# Patient Record
Sex: MALE | Race: WHITE | Employment: FULL TIME | URBAN - METROPOLITAN AREA
[De-identification: names, ages, dates, MRNs, and addresses within clinical notes are randomized per-mention and may not be internally consistent; named-entity substitution may affect disease eponyms.]

---

## 2017-01-20 ENCOUNTER — TELEPHONE (OUTPATIENT)
Dept: FAMILY MEDICINE CLINIC | Age: 37
End: 2017-01-20

## 2017-03-03 ENCOUNTER — OFFICE VISIT (OUTPATIENT)
Dept: FAMILY MEDICINE CLINIC | Age: 37
End: 2017-03-03

## 2017-03-03 VITALS
SYSTOLIC BLOOD PRESSURE: 128 MMHG | BODY MASS INDEX: 33.65 KG/M2 | TEMPERATURE: 98.1 F | HEART RATE: 62 BPM | OXYGEN SATURATION: 96 % | WEIGHT: 209.4 LBS | HEIGHT: 66 IN | DIASTOLIC BLOOD PRESSURE: 83 MMHG | RESPIRATION RATE: 16 BRPM

## 2017-03-03 DIAGNOSIS — Z51.81 ENCOUNTER FOR MEDICATION MONITORING: ICD-10-CM

## 2017-03-03 DIAGNOSIS — R79.89 ELEVATED LFTS: ICD-10-CM

## 2017-03-03 DIAGNOSIS — E78.2 MIXED HYPERLIPIDEMIA: Primary | ICD-10-CM

## 2017-03-03 NOTE — PROGRESS NOTES
Chief Complaint   Patient presents with    Cholesterol Problem     follow up         Lipid 10/31/2016    CMP 8/15/2016

## 2017-03-03 NOTE — PROGRESS NOTES
HISTORY OF PRESENT ILLNESS  Serafin Hutchins is a 43 y.o. male. HPI   For follow up on cholesterol. Feeling well. Hypercholesterolemia follow up:  Has FH of CAD. Has had negative CT heart scan with calcium score of zero March 2015. Compliant w/ low fat, low cholesterol diet. Off of Lipitor d/t elevated LFTS. Was not able to afford tricor which had been added for high triglycerides. Exercising some. No muscle nor abdominal pain, no skin discoloration. Patient is  fasting today. No hx of liver problems in the past.  He does not drink excessively. Hepatitis studies were negative. Patient Active Problem List   Diagnosis Code    Mixed hyperlipidemia E78.2    Family history of early CAD Z80.55    Encounter for medication monitoring Z51.81    Elevated LFTs R79.89       Current Outpatient Prescriptions   Medication Sig Dispense Refill    albuterol (PROVENTIL HFA, VENTOLIN HFA, PROAIR HFA) 90 mcg/actuation inhaler Take 2 Puffs by inhalation every four (4) hours as needed for Wheezing or Shortness of Breath. 1 Inhaler 1    promethazine-codeine (PHENERGAN WITH CODEINE) 6.25-10 mg/5 mL syrup Take 5 mL by mouth every six (6) hours as needed for Cough. Max Daily Amount: 20 mL. 240 mL 0    loratadine (CLARITIN) 10 mg tablet Take 1 tablet by mouth daily as needed for Allergies. 30 tablet 11    omega-3 fatty acids-vitamin e (FISH OIL) 1,000 mg cap Take 1 Cap by mouth two (2) times a day.          No Known Allergies    Past Medical History:   Diagnosis Date    FH: CAD (coronary artery disease)     Hypercholesterolemia        Past Surgical History:   Procedure Laterality Date    HX VASECTOMY  12/2009    HX VASECTOMY      HX WISDOM TEETH EXTRACTION         Family History   Problem Relation Age of Onset    Heart Disease Father     Cancer Father      throat    Hypertension Father     Heart Attack Father     Hypertension Mother     High Cholesterol Mother     No Known Problems Brother        Social History   Substance Use Topics    Smoking status: Never Smoker    Smokeless tobacco: Never Used    Alcohol use No        Lab Results  Component Value Date/Time   WBC 5.9 12/16/2015 08:03 AM   HGB 13.4 12/16/2015 08:03 AM   HCT 39.0 12/16/2015 08:03 AM   PLATELET 812 98/83/1386 08:03 AM   MCV 86 12/16/2015 08:03 AM       Lab Results  Component Value Date/Time   Cholesterol, total 226 10/31/2016 08:05 AM   HDL Cholesterol 27 10/31/2016 08:05 AM   LDL, calculated Comment 10/31/2016 08:05 AM   Triglyceride 428 10/31/2016 08:05 AM   CHOL/HDL Ratio 5.3 09/16/2009 03:29 PM       Lab Results   Component Value Date/Time    Sodium 137 08/15/2016 11:27 AM    Potassium 4.2 08/15/2016 11:27 AM    Chloride 96 08/15/2016 11:27 AM    CO2 24 08/15/2016 11:27 AM    Anion gap 8 09/16/2009 03:29 PM    Glucose 94 08/15/2016 11:27 AM    BUN 19 08/15/2016 11:27 AM    Creatinine 0.89 08/15/2016 11:27 AM    BUN/Creatinine ratio 21 08/15/2016 11:27 AM    GFR est  08/15/2016 11:27 AM    GFR est non- 08/15/2016 11:27 AM    Calcium 10.0 08/15/2016 11:27 AM    Bilirubin, total 0.3 10/31/2016 08:05 AM    ALT (SGPT) 110 10/31/2016 08:05 AM    AST (SGOT) 47 10/31/2016 08:05 AM    Alk. phosphatase 78 10/31/2016 08:05 AM    Protein, total 7.4 10/31/2016 08:05 AM    Albumin 4.8 10/31/2016 08:05 AM    Globulin 3.1 09/16/2009 03:29 PM    A-G Ratio 2.0 08/15/2016 11:27 AM      Lab Results   Component Value Date/Time    Hemoglobin A1c (POC) 5.6 08/15/2016 10:45 AM         Review of Systems   Constitutional: Negative for malaise/fatigue. HENT: Negative for congestion. Eyes: Negative for blurred vision. Respiratory: Negative for cough and shortness of breath. Cardiovascular: Negative for chest pain, palpitations and leg swelling. Gastrointestinal: Negative for abdominal pain, constipation and heartburn. Genitourinary: Negative for dysuria, frequency and urgency. Musculoskeletal: Negative for back pain and joint pain. Neurological: Negative for dizziness, tingling and headaches. Endo/Heme/Allergies: Negative for environmental allergies. Psychiatric/Behavioral: Negative for depression. The patient does not have insomnia. Physical Exam   Constitutional: He is oriented to person, place, and time. He appears well-developed and well-nourished. /83 (BP 1 Location: Left arm, BP Patient Position: Sitting)  Pulse 62  Temp 98.1 °F (36.7 °C) (Oral)   Resp 16  Ht 5' 5.5\" (1.664 m)  Wt 209 lb 6.4 oz (95 kg)  SpO2 96%  BMI 34.32 kg/m2     HENT:   Right Ear: Tympanic membrane and ear canal normal.   Left Ear: Tympanic membrane and ear canal normal.   Nose: No mucosal edema or rhinorrhea. Mouth/Throat: Oropharynx is clear and moist and mucous membranes are normal.   Neck: Normal range of motion. Neck supple. No thyromegaly present. Cardiovascular: Normal rate and regular rhythm. No murmur heard. Pulmonary/Chest: Effort normal and breath sounds normal.   Abdominal: Soft. Bowel sounds are normal. There is no tenderness. Musculoskeletal: Normal range of motion. He exhibits no edema. Lymphadenopathy:     He has no cervical adenopathy. Neurological: He is alert and oriented to person, place, and time. Skin: Skin is warm and dry. Psychiatric: He has a normal mood and affect. Nursing note and vitals reviewed. ASSESSMENT and PLAN  An Celis was seen today for cholesterol problem. Diagnoses and all orders for this visit:    Mixed hyperlipidemia  -     LIPID PANEL    Elevated LFTs  -     IRON PROFILE  -     FERRITIN    Encounter for medication monitoring  -     METABOLIC PANEL, COMPREHENSIVE  -     CBC W/O DIFF      Follow-up Disposition:  Return in about 4 months (around 7/3/2017). reviewed diet, exercise and weight control  cardiovascular risk and specific lipid/LDL goals reviewed    I have discussed diagnosis listed in this note with pt and/or family.  I have discussed treatment plans and options and the risk/benefit analysis of those options, including safe use of medications and possible medication side effects. Through the use of shared decision making we have agreed to the above plan. The patient has received an after-visit summary and questions were answered concerning future plans and follow up. Advise pt of any urgent changes then to proceed to the ER.

## 2017-03-03 NOTE — MR AVS SNAPSHOT
Visit Information Date & Time Provider Department Dept. Phone Encounter #  
 3/3/2017  8:15 AM Roque Mayes MD [facility] [phone] [id number] Follow-up Instructions Return in about 4 months (around 7/3/2017). Upcoming Health Maintenance Date Due DTaP/Tdap/Td series (2 - Td) 4/2/2022 Allergies as of 3/3/2017  Review Complete On: 3/3/2017 By: Roque Mayes MD  
 No Known Allergies Current Immunizations  Reviewed on 8/15/2016 Name Date Influenza Vaccine 10/19/2015, 10/6/2014, 10/22/2013 Influenza Vaccine (Quad) PF 10/31/2016 TDAP Vaccine 4/2/2012 Not reviewed this visit You Were Diagnosed With   
  
 Codes Comments Mixed hyperlipidemia    -  Primary ICD-10-CM: W19.6 ICD-9-CM: 272.2 Elevated LFTs     ICD-10-CM: R79.89 ICD-9-CM: 790.6 Encounter for medication monitoring     ICD-10-CM: Z51.81 
ICD-9-CM: V58.83 Vitals BP  
  
  
  
  
  
 128/83 (BP 1 Location: Left arm, BP Patient Position: Sitting) Vitals History BMI and BSA Data Body Mass Index Body Surface Area  
 34.32 kg/m 2 2.1 m 2 Preferred Pharmacy Pharmacy Name Phone [phone] [phone] Your Updated Medication List  
  
   
This list is accurate as of: 3/3/17  8:58 AM.  Always use your most recent med list.  
  
  
  
  
 albuterol 90 mcg/actuation inhaler Commonly known as:  PROVENTIL HFA, VENTOLIN HFA, PROAIR HFA Take 2 Puffs by inhalation every four (4) hours as needed for Wheezing or Shortness of Breath. CLARITIN 10 mg tablet Generic drug:  loratadine Take 1 tablet by mouth daily as needed for Allergies. FISH OIL 1,000 mg Cap Generic drug:  omega-3 fatty acids-vitamin e Take 1 Cap by mouth two (2) times a day. promethazine-codeine 6.25-10 mg/5 mL syrup Commonly known as:  PHENERGAN with CODEINE Take 5 mL by mouth every six (6) hours as needed for Cough. Max Daily Amount: 20 mL. We Performed the Following CBC W/O DIFF [36126 CPT(R)] FERRITIN [88089 CPT(R)] IRON PROFILE [id number] CPT(R)] LIPID PANEL [22250 CPT(R)] METABOLIC PANEL, COMPREHENSIVE [44436 CPT(R)] Follow-up Instructions Return in about 4 months (around 7/3/2017). Introducing [facility]! Viviana Huerta introduces ITeam patient portal. Now you can access parts of your medical record, email your doctor's office, and request medication refills online. 1. In your internet browser, go to [email or web address] 2. Click on the First Time User? Click Here link in the Sign In box. You will see the New Member Sign Up page. 3. Enter your ITeam Access Code exactly as it appears below. You will not need to use this code after youve completed the sign-up process. If you do not sign up before the expiration date, you must request a new code. · ITeam Access Code: [email or web address] Expires: 6/1/2017  8:30 AM 
 
4. Enter the last four digits of your Social Security Number (xxxx) and Date of Birth (mm/dd/yyyy) as indicated and click Submit. You will be taken to the next sign-up page. 5. Create a ITeam ID. This will be your ITeam login ID and cannot be changed, so think of one that is secure and easy to remember. 6. Create a ITeam password. You can change your password at any time. 7. Enter your Password Reset Question and Answer. This can be used at a later time if you forget your password. 8. Enter your e-mail address. You will receive e-mail notification when new information is available in [address] 9. Click Sign Up. You can now view and download portions of your medical record. 10. Click the Download Summary menu link to download a portable copy of your medical information. If you have questions, please visit the Frequently Asked Questions section of the Cloudnexat website. Remember, PicBadges is NOT to be used for urgent needs. For medical emergencies, dial 911. Now available from your iPhone and Android! Please provide this summary of care documentation to your next provider. Your primary care clinician is listed as Dennis Bundy. If you have any questions after today's visit, please call [phone].

## 2017-03-04 LAB
A1AT SERPL-MCNC: 124 MG/DL (ref 90–200)
ALBUMIN SERPL-MCNC: 5.2 G/DL (ref 3.5–5.5)
ALBUMIN/GLOB SERPL: 2.2 {RATIO} (ref 1.1–2.5)
ALP SERPL-CCNC: 68 IU/L (ref 39–117)
ALT SERPL-CCNC: 109 IU/L (ref 0–44)
ANA SER QL: NEGATIVE
AST SERPL-CCNC: 48 IU/L (ref 0–40)
BILIRUB SERPL-MCNC: 0.4 MG/DL (ref 0–1.2)
BUN SERPL-MCNC: 13 MG/DL (ref 6–20)
BUN/CREAT SERPL: 15 (ref 8–19)
C3 SERPL-MCNC: 185 MG/DL (ref 82–167)
CALCIUM SERPL-MCNC: 10.2 MG/DL (ref 8.7–10.2)
CERULOPLASMIN SERPL-MCNC: 34.3 MG/DL (ref 16–31)
CHLORIDE SERPL-SCNC: 93 MMOL/L (ref 96–106)
CHOLEST SERPL-MCNC: 254 MG/DL (ref 100–199)
CO2 SERPL-SCNC: 22 MMOL/L (ref 18–29)
CREAT SERPL-MCNC: 0.88 MG/DL (ref 0.76–1.27)
DSDNA AB SER-ACNC: <1 IU/ML (ref 0–9)
ERYTHROCYTE [DISTWIDTH] IN BLOOD BY AUTOMATED COUNT: 14.2 % (ref 12.3–15.4)
FERRITIN SERPL-MCNC: 298 NG/ML (ref 30–400)
GLOBULIN SER CALC-MCNC: 2.4 G/DL (ref 1.5–4.5)
GLUCOSE SERPL-MCNC: 91 MG/DL (ref 65–99)
HCT VFR BLD AUTO: 42.3 % (ref 37.5–51)
HDLC SERPL-MCNC: 29 MG/DL
HGB BLD-MCNC: 14.2 G/DL (ref 12.6–17.7)
INTERPRETATION, 910389: NORMAL
IRON SATN MFR SERPL: 27 % (ref 15–55)
IRON SERPL-MCNC: 90 UG/DL (ref 38–169)
LDLC SERPL CALC-MCNC: ABNORMAL MG/DL (ref 0–99)
MCH RBC QN AUTO: 28.5 PG (ref 26.6–33)
MCHC RBC AUTO-ENTMCNC: 33.6 G/DL (ref 31.5–35.7)
MCV RBC AUTO: 85 FL (ref 79–97)
PDF IMAGE, 910387: NORMAL
PLATELET # BLD AUTO: 225 X10E3/UL (ref 150–379)
POTASSIUM SERPL-SCNC: 4.5 MMOL/L (ref 3.5–5.2)
PROT SERPL-MCNC: 7.6 G/DL (ref 6–8.5)
RBC # BLD AUTO: 4.99 X10E6/UL (ref 4.14–5.8)
SODIUM SERPL-SCNC: 135 MMOL/L (ref 134–144)
TIBC SERPL-MCNC: 339 UG/DL (ref 250–450)
TRIGL SERPL-MCNC: 430 MG/DL (ref 0–149)
UIBC SERPL-MCNC: 249 UG/DL (ref 111–343)
VLDLC SERPL CALC-MCNC: ABNORMAL MG/DL (ref 5–40)
WBC # BLD AUTO: 5.2 X10E3/UL (ref 3.4–10.8)

## 2017-03-07 ENCOUNTER — TELEPHONE (OUTPATIENT)
Dept: FAMILY MEDICINE CLINIC | Age: 37
End: 2017-03-07

## 2017-03-07 DIAGNOSIS — R79.89 ELEVATED LFTS: Primary | ICD-10-CM

## 2017-03-15 ENCOUNTER — TELEPHONE (OUTPATIENT)
Dept: FAMILY MEDICINE CLINIC | Age: 37
End: 2017-03-15

## 2017-03-15 NOTE — TELEPHONE ENCOUNTER
Called pt LM an appt has been scheduled with Dr. Esther Johnson 5/5/2017 at 3:30pm. If you have any questions please call back.   (Address and phone number left for pt as well)

## 2017-04-03 ENCOUNTER — HOSPITAL ENCOUNTER (OUTPATIENT)
Dept: ULTRASOUND IMAGING | Age: 37
Discharge: HOME | End: 2017-04-03
Attending: FAMILY MEDICINE
Payer: COMMERCIAL

## 2017-04-03 DIAGNOSIS — R79.89 ELEVATED LFTS: ICD-10-CM

## 2017-04-03 PROCEDURE — 76705 ECHO EXAM OF ABDOMEN: CPT

## 2017-04-06 ENCOUNTER — TELEPHONE (OUTPATIENT)
Dept: FAMILY MEDICINE CLINIC | Age: 37
End: 2017-04-06

## 2017-05-05 ENCOUNTER — OFFICE VISIT (OUTPATIENT)
Dept: HEMATOLOGY | Age: 37
End: 2017-05-05

## 2017-05-05 VITALS
HEIGHT: 66 IN | SYSTOLIC BLOOD PRESSURE: 109 MMHG | HEART RATE: 69 BPM | OXYGEN SATURATION: 97 % | TEMPERATURE: 99 F | BODY MASS INDEX: 33.77 KG/M2 | WEIGHT: 210.13 LBS | DIASTOLIC BLOOD PRESSURE: 55 MMHG

## 2017-05-05 DIAGNOSIS — K76.0 FATTY LIVER: Primary | ICD-10-CM

## 2017-05-05 NOTE — PROGRESS NOTES
Maik Dixon MD, JUAN Vo PA-C Clotilde Caprio, MD, [address], MD Maria De Jesus Andres NP Fain Sayers, NP        [address] [place] [address], [address] Debbi Berg  22.     [phone]     FAX: [address] [place] [address], [address] [facility], [address]     [phone]     FAX: [phone]         Patient Care Team:  Clarisse Jang MD as PCP - General      Problem List  Date Reviewed: 5/5/2017          Codes Class Noted    Encounter for medication monitoring ICD-10-CM: Z51.81  ICD-9-CM: V58.83  3/3/2017        Elevated LFTs ICD-10-CM: R94.5  ICD-9-CM: 790.6  3/3/2017        Family history of early CAD ICD-10-CM: Z82.49  ICD-9-CM: V17.3  12/30/2014        Mixed hyperlipidemia ICD-10-CM: E78.2  ICD-9-CM: 272.2  8/12/2014                The physicians listed above have asked me to see Alize Chandra in consultation regarding elevated liver enzymes and its management. All medical records sent by the referring physicians were reviewed including imaging studies     The patient is a 42 y.o.  male who was first noted to have abnormalities in liver transaminases in 2013      Serologic evaluation for markers of chronic liver disease were all negative. Ultrasound of the liver was performed in 4/2017. The results of the imaging suggested chronic or fatty liver liver disease. An assessment of liver fibrosis with biopsy or elastography has not been performed. The most recent laboratory studies indicate that the liver transaminases are elevated, tests of hepatic synthetic and metabolic function are normal, and the  platelet count is normal.    The patient has no symptoms which could be attributed to the liver disorder.       The patient completes all daily activities without any functional limitations. The patient has not experienced fatigue, pain in the right side over the liver,       ALLERGIES  No Known Allergies    MEDICATIONS  Current Outpatient Prescriptions   Medication Sig    albuterol (PROVENTIL HFA, VENTOLIN HFA, PROAIR HFA) 90 mcg/actuation inhaler Take 2 Puffs by inhalation every four (4) hours as needed for Wheezing or Shortness of Breath.  promethazine-codeine (PHENERGAN WITH CODEINE) 6.25-10 mg/5 mL syrup Take 5 mL by mouth every six (6) hours as needed for Cough. Max Daily Amount: 20 mL.  loratadine (CLARITIN) 10 mg tablet Take 1 tablet by mouth daily as needed for Allergies.  omega-3 fatty acids-vitamin e (FISH OIL) 1,000 mg cap Take 1 Cap by mouth two (2) times a day. No current facility-administered medications for this visit. SYSTEM REVIEW NOT RELATED TO LIVER DISEASE OR REVIEWED ABOVE:  Constitution systems: Negative for fever, chills, weight gain, weight loss. Eyes: Negative for visual changes. ENT: Negative for sore throat, painful swallowing. Respiratory: Negative for cough, hemoptysis, SOB. Cardiology: Negative for chest pain, palpitations. GI:  Negative for constipation or diarrhea. : Negative for urinary frequency, dysuria, hematuria, nocturia. Skin: Negative for rash. Hematology: Negative for easy bruising, blood clots. Musculo-skelatal: Negative for back pain, muscle pain, weakness. Neurologic: Negative for headaches, dizziness, vertigo, memory problems not related to HE. Psychology: Negative for anxiety, depression. FAMILY HISTORY:  The father has the following chronic diseases: throat cancer. The mother has the following chronic diseases: fatty liver. There is no family history of liver disease. SOCIAL HISTORY:  The patient is . The patient has 3 children,   The patient has never used tobacco products. The patient has never consumed significant amounts of alcohol.     The patient currently works full time . PHYSICAL EXAMINATION:  /55  Pulse 69  Temp 99 °F (37.2 °C) (Tympanic)   Ht 5' 5.5\" (1.664 m)  Wt 210 lb 2 oz (95.3 kg)  SpO2 97%  BMI 34.43 kg/m2  General: No acute distress. Eyes: Sclera anicteric. ENT: No oral lesions. Thyroid normal.  Nodes: No adenopathy. Skin: No spider angiomata. No jaundice. No palmar erythema. Respiratory: Lungs clear to auscultation. Cardiovascular: Regular heart rate. No murmurs. No JVD. Abdomen: Soft non-tender. Liver size normal to percussion/palpation. Spleen not palpable. No obvious ascites. Extremities: No edema. No muscle wasting. No gross arthritic changes. Neurologic: Alert and oriented. Cranial nerves grossly intact. No asterixis. LABORATORY STUDIES:  [address] & Units 5/5/2017 3/3/2017   WBC 3.4 - 10.8 x10E3/uL 6.5 5.2   ANC 1.4 - 7.0 x10E3/uL 3.5    HGB 12.6 - 17.7 g/dL 14.5 14.2    - 379 x10E3/uL 242 225   INR 0.8 - 1.2 0.9    AST 0 - 40 IU/L 57 (H) 48 (H)   ALT 0 - 44 IU/L 122 (H) 109 (H)   Alk Phos 39 - 117 IU/L 87 68   Bili, Total 0.0 - 1.2 mg/dL 0.2 0.4   Bili, Direct 0.00 - 0.40 mg/dL 0.09    Albumin 3.5 - 5.5 g/dL 5.0 5.2   BUN 6 - 20 mg/dL 18 13   Creat 0.76 - 1.27 mg/dL 0.83 0.88   Na 134 - 144 mmol/L 135 135   K 3.5 - 5.2 mmol/L 4.0 4.5   Cl 96 - 106 mmol/L 94 (L) 93 (L)   CO2 18 - 29 mmol/L 20 22   Glucose 65 - 99 mg/dL 121 (H) 91     SEROLOGIES:  Serologies Latest Ref Rng & Units 5/5/2017 3/3/2017 11/2/2016   Hep B Surface Ag Negative   Negative   Ferritin 30 - 400 ng/mL  298    Iron % Saturation 15 - 55 %  27    CHRISTI Ab, Direct Negative  Negative    ASMCA 0 - 19 Units 8     Ceruloplasmin 16.0 - 31.0 mg/dL  34.3 (H)    Alpha-1 antitrypsin level 90 - 200 mg/dL  124      LIVER HISTOLOGY:  Not available or performed    ENDOSCOPIC PROCEDURES:  Not available or performed    RADIOLOGY:  4/2017. Ultrasound of liver.   Echogenic consistent with fatty liver.  No liver mass lesions. No dilated bile ducts. No ascites. OTHER TESTING:  Not available or performed    ASSESSMENT AND PLAN:  Persistent elevation in liver transaminases of unclear etiology at this time. Liver function is normal.  The platelet count is normal.      Based upon laboratory studies and imaging the patient does not appear to have advanced liver disease     Serologic testing to help define the cause of the laboratory abnormality were all negative. The most likely causes for the liver chemistry abnormalities were discussed with the patient and include fatty liver disease,     Will perform laboratory testing to monitor liver function and degree of liver injury. This included BMP, hepatic panel, CBC with platelet count, INR. The need to perform a liver biopsy to help determine the cause and severity of the liver test abnormalities was discussed. The risks of performing the liver biopsy including pain, puncture of the lung, gallbladder, intestine or kidney and bleeding were discussed. The patient has decided to have a liver biopsy. This will be scheduled. The patient was directed to continue all current medications at the current dosages. There are no contraindications for the patient to take any medications that are necessary for treatment of other medical issues. The patient was counseled regarding alcohol consumption. The need for vaccination against viral hepatitis A and B will be assessed with serologic and instituted as appropriate. All of the above issues were discussed with the patient. All questions were answered. The patient expressed a clear understanding of the above. [facility] 87 in 2 weeks after liver biopsy.     Lelia Cartwright MD  [facility] [address] [place] [address] [address] 22.  [phone]

## 2017-05-05 NOTE — MR AVS SNAPSHOT
Visit Information Date & Time Provider Department Dept. Phone Encounter #  
 5/5/2017  3:30 PM Margaux Phillips MD [facility] [id number] Follow-up Instructions Return for 2 weeks after LBX. Your Appointments 6/8/2017 11:30 AM  
PROCEDURE with Margaux Phillips MD  
Liver [facility] ([facility]) Appt Note: [address] [phone] [facility] [address]  
[phone]  
  
   
 [address] [facility] [address] [facility] [address]  
  
    
 6/22/2017  9:30 AM  
Follow Up with Margaux Phillips MD  
[facility] ([facility]) Appt Note: Follow up [address] [facility] [address] [phone] [facility] [address]  
[phone]  
  
   
 [facility] [address] [facility] [address]  
  
    
 7/11/2017  8:15 AM  
ROUTINE CARE with Ramon Mcdonnell MD  
[facility]) Appt Note: routine follow up  
 [address] [phone] [address] [facility] [address] Upcoming Health Maintenance Date Due INFLUENZA AGE 9 TO ADULT 8/1/2017 DTaP/Tdap/Td series (2 - Td) 4/2/2022 Allergies as of 5/5/2017  Review Complete On: 5/5/2017 By: Geetha Westbrook LPN No Known Allergies Current Immunizations  Reviewed on 3/3/2017 Name Date Influenza Vaccine 10/19/2015, 10/6/2014, 10/22/2013 Influenza Vaccine (Quad) PF 10/31/2016 TDAP Vaccine 4/2/2012 Not reviewed this visit You Were Diagnosed With   
  
 Codes Comments Fatty liver    -  Primary ICD-10-CM: K76.0 ICD-9-CM: 571.8 Vitals BP Pulse Temp Height(growth percentile) Weight(growth percentile) SpO2  
 109/55 69 99 °F (37.2 °C) (Tympanic) 5' 5.5\" (1.664 m) 210 lb 2 oz (95.3 kg) 97% BMI Smoking Status 34.43 kg/m2 Never Smoker BMI and BSA Data Body Mass Index Body Surface Area 34.43 kg/m 2 2.1 m 2 Preferred Pharmacy Pharmacy Name Phone [phone] [facility] [phone] Your Updated Medication List  
  
   
This list is accurate as of: 5/5/17  4:15 PM.  Always use your most recent med list.  
  
  
  
  
 albuterol 90 mcg/actuation inhaler Commonly known as:  PROVENTIL HFA, VENTOLIN HFA, PROAIR HFA Take 2 Puffs by inhalation every four (4) hours as needed for Wheezing or Shortness of Breath. CLARITIN 10 mg tablet Generic drug:  loratadine Take 1 tablet by mouth daily as needed for Allergies. FISH OIL 1,000 mg Cap Generic drug:  omega-3 fatty acids-vitamin e Take 1 Cap by mouth two (2) times a day. promethazine-codeine 6.25-10 mg/5 mL syrup Commonly known as:  PHENERGAN with CODEINE Take 5 mL by mouth every six (6) hours as needed for Cough. Max Daily Amount: 20 mL. We Performed the Following ACTIN (SMOOTH MUSCLE) ANTIBODY L512970 CPT(R)] CBC WITH AUTOMATED DIFF [52216 CPT(R)] HEMOGLOBIN A1C WITH EAG [44762 CPT(R)] HEPATIC FUNCTION PANEL [52992 CPT(R)] METABOLIC PANEL, BASIC [79967 CPT(R)] PROTHROMBIN TIME + INR [45656 CPT(R)] Follow-up Instructions Return for 2 weeks after LBX. To-Do List   
 06/04/2017 Procedures:  BIOPSY LIVER Introducing [facility]! King Becky introduces Clickslide patient portal. Now you can access parts of your medical record, email your doctor's office, and request medication refills online. 1. In your internet browser, go to [email or web address] 2. Click on the First Time User? Click Here link in the Sign In box. You will see the New Member Sign Up page. 3. Enter your Clickslide Access Code exactly as it appears below. You will not need to use this code after youve completed the sign-up process.  If you do not sign up before the expiration date, you must request a new code. · DEUS Access Code: [email or web address] Expires: 6/1/2017  9:30 AM 
 
4. Enter the last four digits of your Social Security Number (xxxx) and Date of Birth (mm/dd/yyyy) as indicated and click Submit. You will be taken to the next sign-up page. 5. Create a DEUS ID. This will be your DEUS login ID and cannot be changed, so think of one that is secure and easy to remember. 6. Create a DEUS password. You can change your password at any time. 7. Enter your Password Reset Question and Answer. This can be used at a later time if you forget your password. 8. Enter your e-mail address. You will receive e-mail notification when new information is available in [address] 9. Click Sign Up. You can now view and download portions of your medical record. 10. Click the Download Summary menu link to download a portable copy of your medical information. If you have questions, please visit the Frequently Asked Questions section of the DEUS website. Remember, DEUS is NOT to be used for urgent needs. For medical emergencies, dial 911. Now available from your iPhone and Android! Please provide this summary of care documentation to your next provider. Your primary care clinician is listed as Sosa Tucker. If you have any questions after today's visit, please call [phone].

## 2017-05-06 LAB
ALBUMIN SERPL-MCNC: 5 G/DL (ref 3.5–5.5)
ALP SERPL-CCNC: 87 IU/L (ref 39–117)
ALT SERPL-CCNC: 122 IU/L (ref 0–44)
AST SERPL-CCNC: 57 IU/L (ref 0–40)
BASOPHILS # BLD AUTO: 0 X10E3/UL (ref 0–0.2)
BASOPHILS NFR BLD AUTO: 0 %
BILIRUB DIRECT SERPL-MCNC: 0.09 MG/DL (ref 0–0.4)
BILIRUB SERPL-MCNC: 0.2 MG/DL (ref 0–1.2)
BUN SERPL-MCNC: 18 MG/DL (ref 6–20)
BUN/CREAT SERPL: 22 (ref 9–20)
CALCIUM SERPL-MCNC: 10.2 MG/DL (ref 8.7–10.2)
CHLORIDE SERPL-SCNC: 94 MMOL/L (ref 96–106)
CO2 SERPL-SCNC: 20 MMOL/L (ref 18–29)
CREAT SERPL-MCNC: 0.83 MG/DL (ref 0.76–1.27)
EOSINOPHIL # BLD AUTO: 0.1 X10E3/UL (ref 0–0.4)
EOSINOPHIL NFR BLD AUTO: 2 %
ERYTHROCYTE [DISTWIDTH] IN BLOOD BY AUTOMATED COUNT: 14.3 % (ref 12.3–15.4)
EST. AVERAGE GLUCOSE BLD GHB EST-MCNC: 120 MG/DL
GLUCOSE SERPL-MCNC: 121 MG/DL (ref 65–99)
HBA1C MFR BLD: 5.8 % (ref 4.8–5.6)
HCT VFR BLD AUTO: 42.3 % (ref 37.5–51)
HGB BLD-MCNC: 14.5 G/DL (ref 12.6–17.7)
IMM GRANULOCYTES # BLD: 0 X10E3/UL (ref 0–0.1)
IMM GRANULOCYTES NFR BLD: 0 %
INR PPP: 0.9 (ref 0.8–1.2)
LYMPHOCYTES # BLD AUTO: 2.5 X10E3/UL (ref 0.7–3.1)
LYMPHOCYTES NFR BLD AUTO: 38 %
MCH RBC QN AUTO: 29.5 PG (ref 26.6–33)
MCHC RBC AUTO-ENTMCNC: 34.3 G/DL (ref 31.5–35.7)
MCV RBC AUTO: 86 FL (ref 79–97)
MONOCYTES # BLD AUTO: 0.3 X10E3/UL (ref 0.1–0.9)
MONOCYTES NFR BLD AUTO: 5 %
NEUTROPHILS # BLD AUTO: 3.5 X10E3/UL (ref 1.4–7)
NEUTROPHILS NFR BLD AUTO: 55 %
PLATELET # BLD AUTO: 242 X10E3/UL (ref 150–379)
POTASSIUM SERPL-SCNC: 4 MMOL/L (ref 3.5–5.2)
PROT SERPL-MCNC: 7.5 G/DL (ref 6–8.5)
PROTHROMBIN TIME: 9.8 SEC (ref 9.1–12)
RBC # BLD AUTO: 4.92 X10E6/UL (ref 4.14–5.8)
SODIUM SERPL-SCNC: 135 MMOL/L (ref 134–144)
WBC # BLD AUTO: 6.5 X10E3/UL (ref 3.4–10.8)

## 2017-05-07 LAB — ACTIN IGG SERPL-ACNC: 8 UNITS (ref 0–19)

## 2017-06-08 ENCOUNTER — HOSPITAL ENCOUNTER (OUTPATIENT)
Age: 37
Setting detail: OUTPATIENT SURGERY
Discharge: HOME | End: 2017-06-08
Attending: INTERNAL MEDICINE | Admitting: INTERNAL MEDICINE
Payer: COMMERCIAL

## 2017-06-08 ENCOUNTER — APPOINTMENT (OUTPATIENT)
Dept: ULTRASOUND IMAGING | Age: 37
End: 2017-06-08
Attending: INTERNAL MEDICINE
Payer: COMMERCIAL

## 2017-06-08 VITALS
HEART RATE: 64 BPM | HEIGHT: 65 IN | BODY MASS INDEX: 34.22 KG/M2 | OXYGEN SATURATION: 98 % | SYSTOLIC BLOOD PRESSURE: 132 MMHG | DIASTOLIC BLOOD PRESSURE: 75 MMHG | WEIGHT: 205.38 LBS | RESPIRATION RATE: 19 BRPM

## 2017-06-08 DIAGNOSIS — R94.5 NONSPECIFIC ABNORMAL RESULTS OF LIVER FUNCTION STUDY: ICD-10-CM

## 2017-06-08 PROCEDURE — 76942 ECHO GUIDE FOR BIOPSY: CPT

## 2017-06-08 PROCEDURE — 88307 TISSUE EXAM BY PATHOLOGIST: CPT | Performed by: INTERNAL MEDICINE

## 2017-06-08 PROCEDURE — 76040000019: Performed by: INTERNAL MEDICINE

## 2017-06-08 PROCEDURE — 74011250636 HC RX REV CODE- 250/636: Performed by: INTERNAL MEDICINE

## 2017-06-08 PROCEDURE — 88313 SPECIAL STAINS GROUP 2: CPT | Performed by: INTERNAL MEDICINE

## 2017-06-08 PROCEDURE — 77030013826 HC NDL BIOP MAXCOR BARD -B: Performed by: INTERNAL MEDICINE

## 2017-06-08 RX ORDER — SODIUM CHLORIDE 0.9 % (FLUSH) 0.9 %
5-10 SYRINGE (ML) INJECTION AS NEEDED
Status: DISCONTINUED | OUTPATIENT
Start: 2017-06-08 | End: 2017-06-08 | Stop reason: HOSPADM

## 2017-06-08 RX ORDER — SODIUM CHLORIDE 0.9 % (FLUSH) 0.9 %
5-10 SYRINGE (ML) INJECTION EVERY 8 HOURS
Status: DISCONTINUED | OUTPATIENT
Start: 2017-06-08 | End: 2017-06-08 | Stop reason: HOSPADM

## 2017-06-08 RX ORDER — HYDROMORPHONE HYDROCHLORIDE 1 MG/ML
1 INJECTION, SOLUTION INTRAMUSCULAR; INTRAVENOUS; SUBCUTANEOUS
Status: DISCONTINUED | OUTPATIENT
Start: 2017-06-08 | End: 2017-06-08 | Stop reason: HOSPADM

## 2017-06-08 RX ORDER — ONDANSETRON 2 MG/ML
4 INJECTION INTRAMUSCULAR; INTRAVENOUS
Status: DISCONTINUED | OUTPATIENT
Start: 2017-06-08 | End: 2017-06-08 | Stop reason: HOSPADM

## 2017-06-08 RX ADMIN — HYDROMORPHONE HYDROCHLORIDE 0.5 MG: 1 INJECTION, SOLUTION INTRAMUSCULAR; INTRAVENOUS; SUBCUTANEOUS at 12:59

## 2017-06-08 RX ADMIN — HYDROMORPHONE HYDROCHLORIDE 0.5 MG: 1 INJECTION, SOLUTION INTRAMUSCULAR; INTRAVENOUS; SUBCUTANEOUS at 12:31

## 2017-06-08 NOTE — PROGRESS NOTES
Reviewed discharge instructions with patient and wife. Discussed s/s of bleeding and infection. No further drainage noted to biopsy site. Bandaid changed.

## 2017-06-08 NOTE — IP AVS SNAPSHOT
[address] [facility] [address] [place] [address] 
[phone] Patient: Colt Vazquez MRN: [id number] ZNW:3/9/5333 You are allergic to the following No active allergies Recent Documentation Height Weight BMI Smoking Status 1.651 m 93.2 kg 34.18 kg/m2 Never Smoker Emergency Contacts Name Discharge Info Relation Home Work Mobile Santiago Bernal CAREGIVER [3] Spouse [3] [phone] About your hospitalization You were admitted on:  June 8, 2017 You last received care in the:  [facility] ENDOSCOPY You were discharged on:  June 8, 2017 Unit phone number:  [phone] Why you were hospitalized Your primary diagnosis was:  Not on File Providers Seen During Your Hospitalizations Provider Role Specialty Primary office phone Prema Lazo MD Attending Provider Hepatology [phone] Your Primary Care Physician (PCP) Primary Care Physician Office Phone Office Fax Sarah Bis [phone] Follow-up Information Follow up With Details Comments Contact Info Bailee Clemens MD   [address] [place] [address] [phone] 
[phone] Your Appointments Thursday June 22, 2017  9:30 AM EDT Follow Up with Prema Lazo MD  
[facility] [address] ([address] [phone] [address] [place] [address]  
[phone] Tuesday July 11, 2017  8:15 AM EDT ROUTINE CARE with Bailee Clemens MD  
[facility]) [address] [facility] 7 [phone] Current Discharge Medication List  
  
CONTINUE these medications which have NOT CHANGED Dose & Instructions Dispensing Information Comments Morning Noon Evening Bedtime albuterol 90 mcg/actuation inhaler Commonly known as:  PROVENTIL HFA, VENTOLIN HFA, PROAIR HFA Your last dose was: Your next dose is:    
   
   
 Dose:  2 Puff Take 2 Puffs by inhalation every four (4) hours as needed for Wheezing or Shortness of Breath. Quantity:  1 Inhaler Refills:  1 CLARITIN 10 mg tablet Generic drug:  loratadine Your last dose was: Your next dose is:    
   
   
 Dose:  10 mg Take 1 tablet by mouth daily as needed for Allergies. Quantity:  30 tablet Refills:  11 FISH OIL 1,000 mg Cap Generic drug:  omega-3 fatty acids-vitamin e Your last dose was: Your next dose is:    
   
   
 Dose:  1 Cap Take 1 Cap by mouth two (2) times a day. Refills:  0  
     
   
   
   
  
 promethazine-codeine 6.25-10 mg/5 mL syrup Commonly known as:  PHENERGAN with CODEINE Your last dose was: Your next dose is:    
   
   
 Dose:  5 mL Take 5 mL by mouth every six (6) hours as needed for Cough. Max Daily Amount: 20 mL. Quantity:  240 mL Refills:  0 Discharge Instructions [address] [place] [address] Renzo John MD, May JUAN Hill PA-C Nannie Imperial, NP  
 
   at [address], [address] [address]  22. 
   [phone] FAX: [phone]    at [address], [address] - [address] [facility] 
   [phone] FAX: [phone] LIVER BIOPSY DISCHARGE INSTRUCTIONS Gurvinder Schroeder 1980 Date: 6/8/2017 DIET:   
You may resume your previous diet. ACTIVITIES: 
Rest quietly the rest of today. You should not lift any objects more than 20 pounds for the next 2 days. If you work sitting down without strenuous activity you may return to work tomorrow. If you exert yourself or do heavy lifting at work you should take tomorrow off. Do not drive or operate hazardous machinery for 12 hours. SPECIAL INSTRUCTIONS: 
Do not use any aspirin or non-steroidal (Motin, Advil, Naproxen, etc) pain medications for the next 3 days. You may use extra-strength Tylenol (acetaminophen) if you experience pain or discomfort later today. Call the [facility] office if you experience any of the following: 
Persistent or severe abdominal pain. Persistent or severe abdominal distention. Fever and chills Nausea and vomiting. New or unusual symptoms. Follow-up care: You should have a follow up appointment with Dr. Raman Booth to review the results of the liver biopsy results in 2 weeks. If you do not have an appointment please call the office at the number listed above to schedule this. Other instructions: If you have any problems or questions call the [facility] office at the phone number listed above. DISCHARGE SUMMARY from Nurse: The following personal items collected during your admission are returned to you:  
Dental Appliance: Dental Appliances: None Vision:   
Hearing Aid:   
Jewelry:   
Clothing:   
Other Valuables:   
Valuables sent to safe:   
 
 
Discharge Orders None Introducing [facility]! Dear Smiley Munoz: Thank you for requesting a [employer] account. Our records indicate that you already have an active [employer] account. You can access your account anytime at [email or web address] Did you know that you can access your hospital and ER discharge instructions at any time in [employer]? You can also review all of your test results from your hospital stay or ER visit. Additional Information If you have questions, please visit the Frequently Asked Questions section of the [employer] website at [email or web address] Remember, MyChart is NOT to be used for urgent needs. For medical emergencies, dial 911. Now available from your iPhone and Android! General Information Please provide this summary of care documentation to your next provider. Patient Signature:  ____________________________________________________________ Date:  ____________________________________________________________  
  
Andres Malou Provider Signature:  ____________________________________________________________ Date:  ____________________________________________________________

## 2017-06-08 NOTE — H&P
[address] . Cody Solis MD, JUAN Velasco PA-C Rogelio Curb, NP        at [address], [address] [address]  22.     [phone]     FAX: [phone]    at [address], [address] [place] [address], [address]     [phone]     FAX: [phone]       PRE-PROCEDURE NOTE - LIVER BIOPSY    H and P from last office visit reviewed. Allergies reviewed. Out-patient medication list reviewed. Patient Active Problem List   Diagnosis Code    Mixed hyperlipidemia E78.2    Family history of early CAD Z80.55    Encounter for medication monitoring Z51.81    Elevated LFTs R94.5       No Known Allergies    No current facility-administered medications on file prior to encounter. Current Outpatient Prescriptions on File Prior to Encounter   Medication Sig Dispense Refill    promethazine-codeine (PHENERGAN WITH CODEINE) 6.25-10 mg/5 mL syrup Take 5 mL by mouth every six (6) hours as needed for Cough. Max Daily Amount: 20 mL. 240 mL 0    albuterol (PROVENTIL HFA, VENTOLIN HFA, PROAIR HFA) 90 mcg/actuation inhaler Take 2 Puffs by inhalation every four (4) hours as needed for Wheezing or Shortness of Breath. 1 Inhaler 1    loratadine (CLARITIN) 10 mg tablet Take 1 tablet by mouth daily as needed for Allergies. 30 tablet 11    omega-3 fatty acids-vitamin e (FISH OIL) 1,000 mg cap Take 1 Cap by mouth two (2) times a day. For liver biopsy to assess NALFD. The risks of the procedure were discussed with the patient. This included bleeding, pain, and puncture of other organs. All questions were answered. The patient wishes to proceed with the procedure. PHYSICAL EXAMINATION:  VS: per nursing note  General: No acute distress. Eyes: Sclera anicteric. ENT: No oral lesions. Thyroid normal.  Nodes: No adenopathy.    Skin: No spider angiomata. No jaundice. No palmar erythema. Respiratory: Lungs clear to auscultation. Cardiovascular: Regular heart rate. No murmurs. No JVD. Abdomen: Soft non-tender, liver size normal to percussion/palpation. Spleen not palpable. No obvious ascites. Extremities: No edema. No muscle wasting. No gross arthritic changes. Neurologic: Alert and oriented. Cranial nerves grossly intact. No asterixis. LABS:  Lab Results   Component Value Date/Time    WBC 6.5 05/05/2017 04:15 PM    HGB 14.5 05/05/2017 04:15 PM    HCT 42.3 05/05/2017 04:15 PM    PLATELET 262 46/31/0029 04:15 PM    MCV 86 05/05/2017 04:15 PM     Lab Results   Component Value Date/Time    INR 0.9 05/05/2017 04:15 PM    Prothrombin time 9.8 05/05/2017 04:15 PM       ASSESSMENT AND PLAN:  Liver biopsy under ultrasound guidance.     Rene Hoffman MD  Liver [place] [address] [place] [address] [address] 22.  [phone]

## 2017-06-08 NOTE — PROCEDURES
[address] . Cody Solis MD, JUAN Weaver, JOSELIN Mathur NP        at [address], [address] [facility]     [facility], Debbi  22.     [phone]     FAX: [phone]    at [address], [address] [place] [address] [facility] [address]     [phone]     FAX: [phone]       LIVER BIOPSY PROCEDURE NOTE    Mary Gardner  1980    INDICATIONS/PRE-OPERATIVE  DIAGNOSIS:  Elevated liver enzymes. Suspect NAFLD    : Aydin Mcwilliams MD    SEDATION: 1% Lidocaine injection 10 ml    PROCEDURE:  Informed consent to perform the procedure was obtained from the patient. The patient was positioned on the edge of the stretcher lying flat in the supine position. Ultrasound was utilized to image the liver. The diaphragm and any major mass lesion or vascular structures within the liver were identified. An appropriate site for liver biopsy was identified. The distance from the surface of the skin to the liver capsule was 3 cm. This area was prepped with betadine and draped in sterile fashion. The skin was infiltrated with 1% lidocaine. The deeper subcutanous tissues and liver capsule overlying the biopsy site were then infiltrated with 1% lidocaine until appropriate anesthesia was obtained. A small incision was made in the skin so the biopsy devise could be easily inserted. A total of 3 passes with the 18 gauge Bard biopsy devise was then made into the liver. Core(s) of liver tissue totaling 4 cm in length were obtained and placed into tissue fixative. A band aid was placed over the biopsy site. The patient was then repositioned on the right side and transported to the recovery area on the stretcher for routine monitoring until discharge. The specimen was sent to pathology for processing via the normal transport mechanism.        SPECIMEN REMOVED: Liver    ESTIMATED BLOOD LOSS: Negligible.       POST-OPERATIVE DIAGNOSIS: Same as Pre-operative Diagnosis    Prema Lazo MD       6/8/2017  12:23 PM

## 2017-06-08 NOTE — ROUTINE PROCESS
Dorien Dubin  1980  [id number]    Situation:  Verbal report received from: Ileene Cheadle  Procedure: Procedure(s):  LIVER BIOPSY    Background:    Preoperative diagnosis: ELEVATED LFT  Postoperative diagnosis: 1.-Eleveted LFTs    :  Dr. Lazaro Weldon  Assistant(s): Endoscopy Technician-1: Adrienne Adan IV  Endoscopy RN-1: Caryle Filbert, RN    Specimens:   ID Type Source Tests Collected by Time Destination   1 : Liver Biopsy  Preservative   Alex Palencia MD 6/8/2017 1214 Pathology     H. Pylori  no    Assessment:  Intra-procedure medications     Anesthesia gave intra-procedure sedation and medications, see anesthesia flow sheet yes    Intravenous fluids: NS@ KVO     Vital signs stable     Abdominal assessment: round and soft     Recommendation:  Discharge patient per MD order.     Family or Friend   Permission to share finding with family or friend yes

## 2017-06-08 NOTE — DISCHARGE INSTRUCTIONS
[address] . [facility] MD Will, JUAN Morales PA-C Dewight Kent, NP        at [address]     [address] [place] [address], [address] [address]  22.     [phone]     FAX: [phone]    at [facility], [address] [place] [address], [address]     [phone]     FAX: [phone]       LIVER BIOPSY DISCHARGE INSTRUCTIONS      Dada Paz  1980  Date: 6/8/2017    DIET:    You may resume your previous diet. ACTIVITIES:  Rest quietly the rest of today. You should not lift any objects more than 20 pounds for the next 2 days. If you work sitting down without strenuous activity you may return to work tomorrow. If you exert yourself or do heavy lifting at work you should take tomorrow off. Do not drive or operate hazardous machinery for 12 hours. SPECIAL INSTRUCTIONS:  Do not use any aspirin or non-steroidal (Motin, Advil, Naproxen, etc) pain medications for the next 3 days. You may use extra-strength Tylenol (acetaminophen) if you experience pain or discomfort later today. [facility] the [facility] office if you experience any of the following:  Persistent or severe abdominal pain. Persistent or severe abdominal distention. Fever and chills   Nausea and vomiting. New or unusual symptoms. Follow-up care: You should have a follow up appointment with Dr. Renata Mae to review the results of the liver biopsy results in 2 weeks. If you do not have an appointment please call the office at the number listed above to schedule this. Other instructions: If you have any problems or questions call the [facility] office at the phone number listed above. DISCHARGE SUMMARY from Nurse:     The following personal items collected during your admission are returned to you:   Dental Appliance: Dental Appliances: None  Vision:    Hearing Aid: Jewelry:    Clothing:    Other Valuables:    Valuables sent to safe:

## 2017-06-22 ENCOUNTER — OFFICE VISIT (OUTPATIENT)
Dept: HEMATOLOGY | Age: 37
End: 2017-06-22

## 2017-06-22 VITALS
DIASTOLIC BLOOD PRESSURE: 67 MMHG | HEART RATE: 71 BPM | WEIGHT: 201.6 LBS | BODY MASS INDEX: 33.59 KG/M2 | SYSTOLIC BLOOD PRESSURE: 135 MMHG | TEMPERATURE: 99.3 F | HEIGHT: 65 IN

## 2017-06-22 DIAGNOSIS — K76.0 NAFLD (NONALCOHOLIC FATTY LIVER DISEASE): Primary | ICD-10-CM

## 2017-06-22 NOTE — PROGRESS NOTES
[address] Rocky Hamm MD, [address], [facility] [place]       JUAN Conde PA-C Claryce Rick, [facility]   Fredericka Avers, NP Cosme Duverney, NP        at [address], [address] [address]bi Berg  22.     [phone]     FAX: [phone]    at [address], [address] [place] [address], [address]     [phone]     FAX: [phone]       Patient Care Team:  Marcheta Krabbe, MD as PCP - General      Problem List  Date Reviewed: 10/9/2017          Codes Class Noted    NAFLD (nonalcoholic fatty liver disease) ICD-10-CM: K76.0  ICD-9-CM: 571.8  3/3/2017        Family history of early CAD ICD-10-CM: Z82.49  ICD-9-CM: V17.3  12/30/2014        Mixed hyperlipidemia ICD-10-CM: E78.2  ICD-9-CM: 272.2  8/12/2014                Ettie Leventhal returns to the [address] for management of suspected fatty liver disease. The active problem list, all pertinent past medical history, medications, liver histology, radiologic findings and laboratory findings related to the liver disorder were reviewed with the patient. The patient is a 42 y.o.  male who was first noted to have abnormalities in liver transaminases in 2013      Serologic evaluation for markers of chronic liver disease were all negative. Ultrasound of the liver was performed in 4/2017. The results of the imaging suggested chronic or fatty liver liver disease. The patient underwent a liver biopsy in 6/2017. The procedure was well tolerated. I have personally reviewed the liver biopsy slides. This demonstrates mild CALI with no fibrosis.     The most recent laboratory studies indicate that the liver transaminases are elevated, tests of hepatic synthetic and metabolic function are normal, and the  platelet count is normal.    The patient has no symptoms which could be attributed to the liver disorder. The patient completes all daily activities without any functional limitations. The patient has not experienced fatigue, pain in the right side over the liver,       ALLERGIES  No Known Allergies    MEDICATIONS  Current Outpatient Prescriptions   Medication Sig    albuterol (PROVENTIL HFA, VENTOLIN HFA, PROAIR HFA) 90 mcg/actuation inhaler Take 2 Puffs by inhalation every four (4) hours as needed for Wheezing or Shortness of Breath.  promethazine-codeine (PHENERGAN WITH CODEINE) 6.25-10 mg/5 mL syrup Take 5 mL by mouth every six (6) hours as needed for Cough. Max Daily Amount: 20 mL.  loratadine (CLARITIN) 10 mg tablet Take 1 tablet by mouth daily as needed for Allergies.  omega-3 fatty acids-vitamin e (FISH OIL) 1,000 mg cap Take 1 Cap by mouth two (2) times a day.  naproxen (NAPROSYN) 500 mg tablet Take 1 Tab by mouth two (2) times daily as needed. No current facility-administered medications for this visit. SYSTEM REVIEW NOT RELATED TO LIVER DISEASE OR REVIEWED ABOVE:  Constitution systems: Negative for fever, chills, weight gain, weight loss. Eyes: Negative for visual changes. ENT: Negative for sore throat, painful swallowing. Respiratory: Negative for cough, hemoptysis, SOB. Cardiology: Negative for chest pain, palpitations. GI:  Negative for constipation or diarrhea. : Negative for urinary frequency, dysuria, hematuria, nocturia. Skin: Negative for rash. Hematology: Negative for easy bruising, blood clots. Musculo-skelatal: Negative for back pain, muscle pain, weakness. Neurologic: Negative for headaches, dizziness, vertigo, memory problems not related to HE. Psychology: Negative for anxiety, depression. FAMILY HISTORY:  The father has the following chronic diseases: throat cancer. The mother has the following chronic diseases: fatty liver. There is no family history of liver disease.       SOCIAL HISTORY:  The patient is . The patient has 3 children,   The patient has never used tobacco products. The patient has never consumed significant amounts of alcohol. The patient currently works full time . PHYSICAL EXAMINATION:  /67 (BP 1 Location: Left arm, BP Patient Position: Sitting)  Pulse 71  Temp 99.3 °F (37.4 °C) (Tympanic)   Ht 5' 5\" (1.651 m)  Wt 201 lb 9.6 oz (91.4 kg)  BMI 33.55 kg/m2  General: No acute distress. Eyes: Sclera anicteric. ENT: No oral lesions. Thyroid normal.  Nodes: No adenopathy. Skin: No spider angiomata. No jaundice. No palmar erythema. Respiratory: Lungs clear to auscultation. Cardiovascular: Regular heart rate. No murmurs. No JVD. Abdomen: Soft non-tender. Liver size normal to percussion/palpation. Spleen not palpable. No obvious ascites. Extremities: No edema. No muscle wasting. No gross arthritic changes. Neurologic: Alert and oriented. Cranial nerves grossly intact. No asterixis.     LABORATORY STUDIES:  [facility] of [address] [place] [address] & Units 5/5/2017 3/3/2017   WBC 3.4 - 10.8 x10E3/uL 6.5 5.2   ANC 1.4 - 7.0 x10E3/uL 3.5    HGB 12.6 - 17.7 g/dL 14.5 14.2    - 379 x10E3/uL 242 225   INR 0.8 - 1.2 0.9    AST 0 - 40 IU/L 57 (H) 48 (H)   ALT 0 - 44 IU/L 122 (H) 109 (H)   Alk Phos 39 - 117 IU/L 87 68   Bili, Total 0.0 - 1.2 mg/dL 0.2 0.4   Bili, Direct 0.00 - 0.40 mg/dL 0.09    Albumin 3.5 - 5.5 g/dL 5.0 5.2   BUN 6 - 20 mg/dL 18 13   Creat 0.76 - 1.27 mg/dL 0.83 0.88   Na 134 - 144 mmol/L 135 135   K 3.5 - 5.2 mmol/L 4.0 4.5   Cl 96 - 106 mmol/L 94 (L) 93 (L)   CO2 18 - 29 mmol/L 20 22   Glucose 65 - 99 mg/dL 121 (H) 91     SEROLOGIES:  Serologies Latest Ref Rng & Units 5/5/2017 3/3/2017 11/2/2016   Hep B Surface Ag Negative   Negative   Ferritin 30 - 400 ng/mL  298    Iron % Saturation 15 - 55 %  27    CHRISTI Ab, Direct Negative  Negative    ASMCA 0 - 19 Units 8     Ceruloplasmin 16.0 - 31.0 mg/dL  34.3 (H)    Alpha-1 antitrypsin level 90 - 200 mg/dL  124      LIVER HISTOLOGY:  6/2017. Slides reviewed by MLS. NAFLD. 66-75% macro and micovesicular steatosis. Mild ballooning. Moderate inflammation. No fibrosis. ENDOSCOPIC PROCEDURES:  Not available or performed    RADIOLOGY:  4/2017. Ultrasound of liver. Echogenic consistent with fatty liver. No liver mass lesions. No dilated bile ducts. No ascites. OTHER TESTING:  Not available or performed    ASSESSMENT AND PLAN:  CALI with no  fibrosis. Overall the biopsy can be classified as mild CALI. There is balloning of hepatocytes but this is mild. There is inflammation but this is also mild. NAFLD is very responsive to body weight and will improve if weight loss can be achieved. If the patient looses 20% of current body weight, which is down to a weight of of 150-155 pounds, all steatosis will have resolved. Once all steatosis has resolved all kinflammation will resolve. Then all fibrosis will gradually resolve and the liver will eventually be normal.    If the patient remains at current weight or gains weight CALI will progress, more fibrosis will develop and she will be at risk to develop cirrhosis. Liver transaminases are elevated. Alkaline phosphate is normal.  Liver function is normal.  The platelet count is normal.      The patient was counseled regarding diet and exercise to achieve weight loss. The best diet for patients with fatty liver is one very low in carbohydrates and enriched with protein such as an Deny's program.      The patient was told to to consume any food products and drinks containing fructose as this enhances hepatic fat synthesis. Since the last appointement 6 weeks ago the patient has lost 9 pounds. There is no medication of vitamin supplements that we advocate for CALI.   Using glitazones in patients without diabetes mellitus has been shown to reduce fat content in the liver but has no effect on fibrosis and is associated with weight gain. Vitamin E has also been used but the data is not very good and most experts no longer advocate this. The only medical treatments for CALI are though clinical trials. The patient was offered participation in a clinical trial for treatment of CALI. The patient is not eligible to particiapte in a clinical trial because the liver disease is too mild. We will continue to monitor the patient at periodic intervals. If weight loss is successful the fat will resolve from the liver and liver enzymes should return to the normal range. We would then repeat an ultrasound to see if this also returns to normal.  If liver enzymes do not return to normal with weight reduction additional evaluation may be necessary over time. The patient was directed to continue all current medications at the current dosages. There are no contraindications for the patient to take any medications that are necessary for treatment of other medical issues including medications for diabetes mellitus and hypercholesterolemia. The patient was counseled regarding alcohol consumption and that this could contribute to fatty liver disease. The need for vaccination against viral hepatitis A and B will be assessed with serologic and instituted as appropriate. All of the above issues were discussed with the patient. All questions were answered. The patient expressed a clear understanding of the above.     [address] [place] [address] in 6 months    Marino Vasquez MD  Liver [place] of [address] [place] [address] [address]   [place]  22.  [phone]

## 2017-06-22 NOTE — MR AVS SNAPSHOT
Visit Information Date & Time Provider Department Dept. Phone Encounter #  
 6/22/2017  9:30 AM Willis [facility] [address] [place] [address] [id number] Follow-up Instructions Return in about 6 months (around 12/22/2017) for FS with NP. Your Appointments 7/11/2017  8:15 AM  
ROUTINE CARE with Radames Ventura MD  
[facility] [address] [facility]) Appt Note: routine follow up  
 [address] [place] [address] [facility] [phone] [address] [facility] [address] Upcoming Health Maintenance Date Due INFLUENZA AGE 9 TO ADULT 8/1/2017 DTaP/Tdap/Td series (2 - Td) 4/2/2022 Allergies as of 6/22/2017  Review Complete On: 6/22/2017 By: Lisset White No Known Allergies Current Immunizations  Reviewed on 3/3/2017 Name Date Influenza Vaccine 10/19/2015, 10/6/2014, 10/22/2013 Influenza Vaccine (Quad) PF 10/31/2016 TDAP Vaccine 4/2/2012 Not reviewed this visit Vitals BP Pulse Temp Height(growth percentile) 135/67 (BP 1 Location: Left arm, BP Patient Position: Sitting) 71 99.3 °F (37.4 °C) (Tympanic) 5' 5\" (1.651 m) Weight(growth percentile) BMI Smoking Status 201 lb 9.6 oz (91.4 kg) 33.55 kg/m2 Never Smoker BMI and BSA Data Body Mass Index Body Surface Area  
 33.55 kg/m 2 2.05 m 2 Preferred Pharmacy Pharmacy Name Phone [id number] [phone] Your Updated Medication List  
  
   
This list is accurate as of: 6/22/17 10:24 AM.  Always use your most recent med list.  
  
  
  
  
 albuterol 90 mcg/actuation inhaler Commonly known as:  PROVENTIL HFA, VENTOLIN HFA, PROAIR HFA Take 2 Puffs by inhalation every four (4) hours as needed for Wheezing or Shortness of Breath. CLARITIN 10 mg tablet Generic drug:  loratadine Take 1 tablet by mouth daily as needed for Allergies. FISH OIL 1,000 mg Cap Generic drug:  omega-3 fatty acids-vitamin e Take 1 Cap by mouth two (2) times a day. promethazine-codeine 6.25-10 mg/5 mL syrup Commonly known as:  PHENERGAN with CODEINE Take 5 mL by mouth every six (6) hours as needed for Cough. Max Daily Amount: 20 mL. Follow-up Instructions Return in about 6 months (around 12/22/2017) for FS with NP. Introducing [facility] SERVICES! Dear Lisette Pineda: Thank you for requesting a Philanthropedia account. Our records indicate that you already have an active Philanthropedia account. You can access your account anytime at [email or web address] Energy Did you know that you can access your hospital and ER discharge instructions at any time in Philanthropedia? You can also review all of your test results from your hospital stay or ER visit. Additional Information If you have questions, please visit the Frequently Asked Questions section of the Philanthropedia website at [email or web address] Remember, Philanthropedia is NOT to be used for urgent needs. For medical emergencies, dial 911. Now available from your iPhone and Android! Please provide this summary of care documentation to your next provider. Your primary care clinician is listed as Rolando Reaves. If you have any questions after today's visit, please call [phone].

## 2017-07-11 ENCOUNTER — OFFICE VISIT (OUTPATIENT)
Dept: FAMILY MEDICINE CLINIC | Age: 37
End: 2017-07-11

## 2017-07-11 VITALS
DIASTOLIC BLOOD PRESSURE: 75 MMHG | WEIGHT: 202.6 LBS | TEMPERATURE: 98 F | OXYGEN SATURATION: 95 % | RESPIRATION RATE: 16 BRPM | HEART RATE: 77 BPM | HEIGHT: 65 IN | SYSTOLIC BLOOD PRESSURE: 114 MMHG | BODY MASS INDEX: 33.76 KG/M2

## 2017-07-11 DIAGNOSIS — M54.32 SCIATICA OF LEFT SIDE: ICD-10-CM

## 2017-07-11 DIAGNOSIS — E78.2 MIXED HYPERLIPIDEMIA: Primary | ICD-10-CM

## 2017-07-11 DIAGNOSIS — K75.81 NASH (NONALCOHOLIC STEATOHEPATITIS): ICD-10-CM

## 2017-07-11 DIAGNOSIS — R73.9 HYPERGLYCEMIA: ICD-10-CM

## 2017-07-11 DIAGNOSIS — Z51.81 ENCOUNTER FOR MEDICATION MONITORING: ICD-10-CM

## 2017-07-11 RX ORDER — NAPROXEN 500 MG/1
500 TABLET ORAL
Qty: 30 TAB | Refills: 1 | Status: SHIPPED | OUTPATIENT
Start: 2017-07-11 | End: 2018-06-04

## 2017-07-11 NOTE — PATIENT INSTRUCTIONS
Back Stretches: Exercises  Your Care Instructions  Here are some examples of exercises for stretching your back. Start each exercise slowly. Ease off the exercise if you start to have pain. Your doctor or physical therapist will tell you when you can start these exercises and which ones will work best for you. How to do the exercises  Overhead stretch    1. Stand comfortably with your feet shoulder-width apart. 2. Looking straight ahead, raise both arms over your head and reach toward the ceiling. Do not allow your head to tilt back. 3. Hold for 15 to 30 seconds, then lower your arms to your sides. 4. Repeat 2 to 4 times. Side stretch    1. Stand comfortably with your feet shoulder-width apart. 2. Raise one arm over your head, and then lean to the other side. 3. Slide your hand down your leg as you let the weight of your arm gently stretch your side muscles. Hold for 15 to 30 seconds. 4. Repeat 2 to 4 times on each side. Press-up    1. Lie on your stomach, supporting your body with your forearms. 2. Press your elbows down into the floor to raise your upper back. As you do this, relax your stomach muscles and allow your back to arch without using your back muscles. As your press up, do not let your hips or pelvis come off the floor. 3. Hold for 15 to 30 seconds, then relax. 4. Repeat 2 to 4 times. Relax and rest    1. Lie on your back with a rolled towel under your neck and a pillow under your knees. Extend your arms comfortably to your sides. 2. Relax and breathe normally. 3. Remain in this position for about 10 minutes. 4. If you can, do this 2 or 3 times each day. Follow-up care is a key part of your treatment and safety. Be sure to make and go to all appointments, and call your doctor if you are having problems. It's also a good idea to know your test results and keep a list of the medicines you take. Where can you learn more? Go to http://rodrigo-ellen.info/.   Enter R269 in the search box to learn more about \"Back Stretches: Exercises. \"  Current as of: March 21, 2017  Content Version: 11.3  © 0571-5869 [employer]. Care instructions adapted under license by [employer] (which disclaims liability or warranty for this information). If you have questions about a medical condition or this instruction, always ask your healthcare professional. [place] [address] any warranty or liability for your use of this information.

## 2017-07-11 NOTE — MR AVS SNAPSHOT
Visit Information Date & Time Provider Department Dept. Phone Encounter #  
 7/11/2017  8:15 AM Brynn Gilbert MD [facility] [phone] [id number] Follow-up Instructions Return in about 4 months (around 11/15/2017). Upcoming Health Maintenance Date Due INFLUENZA AGE 9 TO ADULT 8/1/2017 DTaP/Tdap/Td series (2 - Td) 4/2/2022 Allergies as of 7/11/2017  Review Complete On: 7/11/2017 By: Porsche Issa LPN No Known Allergies Current Immunizations  Reviewed on 3/3/2017 Name Date Influenza Vaccine 10/19/2015, 10/6/2014, 10/22/2013 Influenza Vaccine (Quad) PF 10/31/2016 TDAP Vaccine 4/2/2012 Not reviewed this visit Vitals BP Pulse Temp Resp Height(growth percentile) Weight(growth percentile) 114/75 (BP 1 Location: Left arm, BP Patient Position: Sitting) 77 98 °F (36.7 °C) (Oral) 16 5' 5\" (1.651 m) 202 lb 9.6 oz (91.9 kg) SpO2 BMI Smoking Status 95% 33.71 kg/m2 Never Smoker Vitals History BMI and BSA Data Body Mass Index Body Surface Area 33.71 kg/m 2 2.05 m 2 Preferred Pharmacy Pharmacy Name Phone [phone] [facility] [phone] Your Updated Medication List  
  
   
This list is accurate as of: 7/11/17  9:27 AM.  Always use your most recent med list.  
  
  
  
  
 albuterol 90 mcg/actuation inhaler Commonly known as:  PROVENTIL HFA, VENTOLIN HFA, PROAIR HFA Take 2 Puffs by inhalation every four (4) hours as needed for Wheezing or Shortness of Breath. CLARITIN 10 mg tablet Generic drug:  loratadine Take 1 tablet by mouth daily as needed for Allergies. FISH OIL 1,000 mg Cap Generic drug:  omega-3 fatty acids-vitamin e Take 1 Cap by mouth two (2) times a day. naproxen 500 mg tablet Commonly known as:  NAPROSYN Take 1 Tab by mouth two (2) times daily as needed. promethazine-codeine 6.25-10 mg/5 mL syrup Commonly known as:  PHENERGAN with CODEINE Take 5 mL by mouth every six (6) hours as needed for Cough. Max Daily Amount: 20 mL. Prescriptions Sent to Pharmacy Refills  
 naproxen (NAPROSYN) 500 mg tablet 1 Sig: Take 1 Tab by mouth two (2) times daily as needed. Class: Normal  
 Pharmacy: [address] #: [phone] Route: Oral  
  
Follow-up Instructions Return in about 4 months (around 11/15/2017). To-Do List   
 12/22/2017 1:30 PM  
  Appointment with ANAM Packer at [facility] ([phone]) Patient Instructions Back Stretches: Exercises Your Care Instructions Here are some examples of exercises for stretching your back. Start each exercise slowly. Ease off the exercise if you start to have pain. Your doctor or physical therapist will tell you when you can start these exercises and which ones will work best for you. How to do the exercises Overhead stretch 1. Stand comfortably with your feet shoulder-width apart. 2. Looking straight ahead, raise both arms over your head and reach toward the ceiling. Do not allow your head to tilt back. 3. Hold for 15 to 30 seconds, then lower your arms to your sides. 4. Repeat 2 to 4 times. Side stretch 1. Stand comfortably with your feet shoulder-width apart. 2. Raise one arm over your head, and then lean to the other side. 3. Slide your hand down your leg as you let the weight of your arm gently stretch your side muscles. Hold for 15 to 30 seconds. 4. Repeat 2 to 4 times on each side. Press-up 1. Lie on your stomach, supporting your body with your forearms. 2. Press your elbows down into the floor to raise your upper back.  As you do this, relax your stomach muscles and allow your back to arch without using your back muscles. As your press up, do not let your hips or pelvis come off the floor. 3. Hold for 15 to 30 seconds, then relax. 4. Repeat 2 to 4 times. Relax and rest 
 
1. Lie on your back with a rolled towel under your neck and a pillow under your knees. Extend your arms comfortably to your sides. 2. Relax and breathe normally. 3. Remain in this position for about 10 minutes. 4. If you can, do this 2 or 3 times each day. Follow-up care is a key part of your treatment and safety. Be sure to make and go to all appointments, and call your doctor if you are having problems. It's also a good idea to know your test results and keep a list of the medicines you take. Where can you learn more? Go to http://rodrigo-ellen.info/. Enter M888 in the search box to learn more about \"Back Stretches: Exercises. \" Current as of: March 21, 2017 Content Version: 11.3 © 7929-8236 [employer]. Care instructions adapted under license by [employer] (which disclaims liability or warranty for this information). If you have questions about a medical condition or this instruction, always ask your healthcare professional. [place] [address] any warranty or liability for your use of this information. Introducing [facility] & HEALTH SERVICES! Dear Marychuy Anton: Thank you for requesting a Biottery account. Our records indicate that you already have an active Biottery account. You can access your account anytime at [email or web address]. [email or web address] Did you know that you can access your hospital and ER discharge instructions at any time in Biottery? You can also review all of your test results from your hospital stay or ER visit. Additional Information If you have questions, please visit the Frequently Asked Questions section of the Biottery website at [email or web address]/. Remember, Biottery is NOT to be used for urgent needs.  For medical emergencies, dial 911. Now available from your iPhone and Android! Please provide this summary of care documentation to your next provider. Your primary care clinician is listed as Nahomy Gipson. If you have any questions after today's visit, please call [phone].

## 2017-07-11 NOTE — PROGRESS NOTES
HISTORY OF PRESENT ILLNESS  Sheri Schneider is a 42 y.o. male. HPI   For follow up on cholesterol. Feeling well. Hypercholesterolemia follow up:  Has FH of early CAD. Has had negative CT heart scan with calcium score of zero March 2015. Compliant w/ low fat, low cholesterol diet. Off of Lipitor d/t elevated LFTS. Was not able to afford tricor which had been added for high triglycerides. Exercising some. No muscle nor abdominal pain, no skin discoloration. Patient is not fasting today so he will return later in the week for fasting labs. .  No hx of liver problems in the past.  He does not drink excessively. Hepatitis studies were negative. Had liver bx done on last month showing fatty liver. C/o left side low back pain for the past few weeks. Sx comes and goes. Radiates to his left buttock and sometimes down his left leg. Notices increase pain with lifting. No previous hx of back problems noted. Pain is 5-6/10 when at its worse. Has only been taking occassional aleve for the pain which does help. Patient Active Problem List   Diagnosis Code    Mixed hyperlipidemia E78.2    Family history of early CAD Z80.55    Encounter for medication monitoring Z51.81    Elevated LFTs R94.5    CALI (nonalcoholic steatohepatitis) K75.81       Current Outpatient Prescriptions   Medication Sig Dispense Refill    naproxen (NAPROSYN) 500 mg tablet Take 1 Tab by mouth two (2) times daily as needed. 30 Tab 1    albuterol (PROVENTIL HFA, VENTOLIN HFA, PROAIR HFA) 90 mcg/actuation inhaler Take 2 Puffs by inhalation every four (4) hours as needed for Wheezing or Shortness of Breath. 1 Inhaler 1    promethazine-codeine (PHENERGAN WITH CODEINE) 6.25-10 mg/5 mL syrup Take 5 mL by mouth every six (6) hours as needed for Cough. Max Daily Amount: 20 mL. 240 mL 0    loratadine (CLARITIN) 10 mg tablet Take 1 tablet by mouth daily as needed for Allergies.  30 tablet 11    omega-3 fatty acids-vitamin e (FISH OIL) 1,000 mg cap Take 1 Cap by mouth two (2) times a day. No Known Allergies      Past Medical History:   Diagnosis Date    FH: CAD (coronary artery disease)     History of liver biopsy 05/2017    Hypercholesterolemia     Ill-defined condition     High cholesterol         Past Surgical History:   Procedure Laterality Date    HX VASECTOMY  12/2009    HX VASECTOMY      HX WISDOM TEETH EXTRACTION           Family History   Problem Relation Age of Onset    Heart Disease Father     Cancer Father      throat    Hypertension Father     Heart Attack Father     Hypertension Mother     High Cholesterol Mother     No Known Problems Brother        Social History   Substance Use Topics    Smoking status: Never Smoker    Smokeless tobacco: Never Used    Alcohol use No        Lab Results   Component Value Date/Time    WBC 6.5 05/05/2017 04:15 PM    HGB 14.5 05/05/2017 04:15 PM    HCT 42.3 05/05/2017 04:15 PM    PLATELET 454 76/60/2814 04:15 PM    MCV 86 05/05/2017 04:15 PM       Lab Results   Component Value Date/Time    Cholesterol, total 254 03/03/2017 09:10 AM    HDL Cholesterol 29 03/03/2017 09:10 AM    LDL, calculated Comment 03/03/2017 09:10 AM    Triglyceride 430 03/03/2017 09:10 AM    CHOL/HDL Ratio 5.3 09/16/2009 03:29 PM       Lab Results   Component Value Date/Time    Sodium 135 05/05/2017 04:15 PM    Potassium 4.0 05/05/2017 04:15 PM    Chloride 94 05/05/2017 04:15 PM    CO2 20 05/05/2017 04:15 PM    Anion gap 8 09/16/2009 03:29 PM    Glucose 121 05/05/2017 04:15 PM    BUN 18 05/05/2017 04:15 PM    Creatinine 0.83 05/05/2017 04:15 PM    BUN/Creatinine ratio 22 05/05/2017 04:15 PM    GFR est  05/05/2017 04:15 PM    GFR est non- 05/05/2017 04:15 PM    Calcium 10.2 05/05/2017 04:15 PM    Bilirubin, total 0.2 05/05/2017 04:15 PM    ALT (SGPT) 122 05/05/2017 04:15 PM    AST (SGOT) 57 05/05/2017 04:15 PM    Alk.  phosphatase 87 05/05/2017 04:15 PM    Protein, total 7.5 05/05/2017 04:15 PM Albumin 5.0 05/05/2017 04:15 PM    Globulin 3.1 09/16/2009 03:29 PM    A-G Ratio 2.2 03/03/2017 09:10 AM      Lab Results   Component Value Date/Time    Hemoglobin A1c 5.8 05/05/2017 04:15 PM    Hemoglobin A1c (POC) 5.6 08/15/2016 10:45 AM         Review of Systems   Constitutional: Negative for malaise/fatigue. HENT: Negative for congestion. Eyes: Negative for blurred vision. Respiratory: Negative for cough and shortness of breath. Cardiovascular: Negative for chest pain, palpitations and leg swelling. Gastrointestinal: Negative for abdominal pain, constipation and heartburn. Genitourinary: Negative for dysuria, frequency and urgency. Musculoskeletal: Negative for back pain and joint pain. Neurological: Negative for dizziness, tingling and headaches. Endo/Heme/Allergies: Negative for environmental allergies. Psychiatric/Behavioral: Negative for depression. The patient does not have insomnia. Physical Exam   Constitutional: He is oriented to person, place, and time. He appears well-developed and well-nourished. /75 (BP 1 Location: Left arm, BP Patient Position: Sitting)  Pulse 77  Temp 98 °F (36.7 °C) (Oral)   Resp 16  Ht 5' 5\" (1.651 m)  Wt 202 lb 9.6 oz (91.9 kg)  SpO2 95%  BMI 33.71 kg/m2    HENT:   Right Ear: Tympanic membrane and ear canal normal.   Left Ear: Tympanic membrane and ear canal normal.   Nose: No mucosal edema or rhinorrhea. Mouth/Throat: Oropharynx is clear and moist and mucous membranes are normal.   Neck: Normal range of motion. Neck supple. No thyromegaly present. Cardiovascular: Normal rate and regular rhythm. No murmur heard. Pulmonary/Chest: Effort normal and breath sounds normal.   Abdominal: Soft. Bowel sounds are normal. There is no tenderness. Musculoskeletal: Normal range of motion. He exhibits no edema. Left side lower back tenderness. Negative SLR. Lymphadenopathy:     He has no cervical adenopathy.    Neurological: He is alert and oriented to person, place, and time. Skin: Skin is warm and dry. Psychiatric: He has a normal mood and affect. Nursing note and vitals reviewed. ASSESSMENT and PLAN  Efra Skaggs was seen today for cholesterol problem. Diagnoses and all orders for this visit:    Mixed hyperlipidemia  -     LIPID PANEL  Will see what LFTs are prior to restarting statin    CALI (nonalcoholic steatohepatitis)  Low carb, low fat diet as per specialist.  Increase exercise to help with weight reduction. Sciatica of left side  -     naproxen (NAPROSYN) 500 mg tablet; Take 1 Tab by mouth two (2) times daily as needed. Instructions for exercises given and reviewed with pt. Pt also to use heat to the area 3-4 times a day over the next several days until sx are improved. Hyperglycemia  -     AMB POC HEMOGLOBIN A1C    Encounter for medication monitoring  -     METABOLIC PANEL, COMPREHENSIVE      Follow-up Disposition:  Return in about 4 months (around 11/15/2017). reviewed diet, exercise and weight control  cardiovascular risk and specific lipid/LDL goals reviewed    I have discussed diagnosis listed in this note with pt and/or family. I have discussed treatment plans and options and the risk/benefit analysis of those options, including safe use of medications and possible medication side effects. Through the use of shared decision making we have agreed to the above plan. The patient has received an after-visit summary and questions were answered concerning future plans and follow up. Advise pt of any urgent changes then to proceed to the ER.

## 2017-07-11 NOTE — PROGRESS NOTES
Chief Complaint   Patient presents with    Cholesterol Problem     follow up       BMP 5/5/2017    A1C 5/5/2017    Lipid 3/3/2017

## 2017-10-09 PROBLEM — K75.81 NASH (NONALCOHOLIC STEATOHEPATITIS): Status: RESOLVED | Noted: 2017-07-11 | Resolved: 2017-10-09

## 2017-10-09 PROBLEM — K76.0 NAFLD (NONALCOHOLIC FATTY LIVER DISEASE): Status: ACTIVE | Noted: 2017-03-03

## 2017-10-09 PROBLEM — Z51.81 ENCOUNTER FOR MEDICATION MONITORING: Status: RESOLVED | Noted: 2017-03-03 | Resolved: 2017-10-09

## 2017-11-05 NOTE — PROGRESS NOTES
HISTORY OF PRESENT ILLNESS  Jaziel Muller is a 42 y.o. male. HPI   For follow up on cholesterol. Feeling well. Hypercholesterolemia follow up:  Has FH of CAD. Has had negative CT heart scan with calcium score of zero March 2015. Compliant w/ low fat, low cholesterol diet. Off of Lipitor d/t elevated LFTS. Was not able to afford tricor which had been added for high triglycerides. Exercising some. No muscle nor abdominal pain, no skin discoloration. Patient is fasting today. No hx of liver problems in the past.  He does not drink excessively. Hepatitis studies were negative. Has been seeing Hepatology and has appt for follow up for next month. Patient Active Problem List   Diagnosis Code    Mixed hyperlipidemia E78.2    Family history of early CAD Z80.55    NAFLD (nonalcoholic fatty liver disease) K76.0       Current Outpatient Prescriptions   Medication Sig Dispense Refill    naproxen (NAPROSYN) 500 mg tablet Take 1 Tab by mouth two (2) times daily as needed. 30 Tab 1    albuterol (PROVENTIL HFA, VENTOLIN HFA, PROAIR HFA) 90 mcg/actuation inhaler Take 2 Puffs by inhalation every four (4) hours as needed for Wheezing or Shortness of Breath. 1 Inhaler 1    promethazine-codeine (PHENERGAN WITH CODEINE) 6.25-10 mg/5 mL syrup Take 5 mL by mouth every six (6) hours as needed for Cough. Max Daily Amount: 20 mL. 240 mL 0    loratadine (CLARITIN) 10 mg tablet Take 1 tablet by mouth daily as needed for Allergies. 30 tablet 11    omega-3 fatty acids-vitamin e (FISH OIL) 1,000 mg cap Take 1 Cap by mouth two (2) times a day.          No Known Allergies      Past Medical History:   Diagnosis Date    FH: CAD (coronary artery disease)     History of liver biopsy 05/2017    Hypercholesterolemia     Ill-defined condition     High cholesterol         Past Surgical History:   Procedure Laterality Date    HX VASECTOMY  12/2009    HX VASECTOMY      HX WISDOM TEETH EXTRACTION           Family History Problem Relation Age of Onset    Heart Disease Father     Cancer Father      throat    Hypertension Father     Heart Attack Father     Hypertension Mother     High Cholesterol Mother     No Known Problems Brother        Social History   Substance Use Topics    Smoking status: Never Smoker    Smokeless tobacco: Never Used    Alcohol use No        Lab Results   Component Value Date/Time    WBC 6.5 05/05/2017 04:15 PM    HGB 14.5 05/05/2017 04:15 PM    HCT 42.3 05/05/2017 04:15 PM    PLATELET 941 28/05/3734 04:15 PM    MCV 86 05/05/2017 04:15 PM       Lab Results   Component Value Date/Time    Cholesterol, total 254 03/03/2017 09:10 AM    HDL Cholesterol 29 03/03/2017 09:10 AM    LDL, calculated Comment 03/03/2017 09:10 AM    Triglyceride 430 03/03/2017 09:10 AM    CHOL/HDL Ratio 5.3 09/16/2009 03:29 PM       Lab Results   Component Value Date/Time    Sodium 135 05/05/2017 04:15 PM    Potassium 4.0 05/05/2017 04:15 PM    Chloride 94 05/05/2017 04:15 PM    CO2 20 05/05/2017 04:15 PM    Anion gap 8 09/16/2009 03:29 PM    Glucose 121 05/05/2017 04:15 PM    BUN 18 05/05/2017 04:15 PM    Creatinine 0.83 05/05/2017 04:15 PM    BUN/Creatinine ratio 22 05/05/2017 04:15 PM    GFR est  05/05/2017 04:15 PM    GFR est non- 05/05/2017 04:15 PM    Calcium 10.2 05/05/2017 04:15 PM    Bilirubin, total 0.2 05/05/2017 04:15 PM    ALT (SGPT) 122 05/05/2017 04:15 PM    AST (SGOT) 57 05/05/2017 04:15 PM    Alk. phosphatase 87 05/05/2017 04:15 PM    Protein, total 7.5 05/05/2017 04:15 PM    Albumin 5.0 05/05/2017 04:15 PM    Globulin 3.1 09/16/2009 03:29 PM    A-G Ratio 2.2 03/03/2017 09:10 AM      Lab Results   Component Value Date/Time    Hemoglobin A1c 5.8 05/05/2017 04:15 PM    Hemoglobin A1c (POC) 5.6 08/15/2016 10:45 AM         Review of Systems   Constitutional: Negative for malaise/fatigue. HENT: Negative for congestion. Eyes: Negative for blurred vision.    Respiratory: Negative for cough and shortness of breath. Cardiovascular: Negative for chest pain, palpitations and leg swelling. Gastrointestinal: Negative for abdominal pain, constipation and heartburn. Genitourinary: Negative for dysuria, frequency and urgency. Musculoskeletal: Negative for back pain and joint pain. Neurological: Negative for dizziness, tingling and headaches. Endo/Heme/Allergies: Negative for environmental allergies. Psychiatric/Behavioral: Negative for depression. The patient does not have insomnia. Physical Exam   Constitutional: He is oriented to person, place, and time. He appears well-developed and well-nourished. /86 (BP 1 Location: Left arm, BP Patient Position: Sitting)  Pulse 78  Temp 98.3 °F (36.8 °C) (Oral)   Resp 16  Ht 5' 5\" (1.651 m)  Wt 203 lb 12.8 oz (92.4 kg)  SpO2 96%  BMI 33.91 kg/m2  HENT:   Right Ear: Tympanic membrane and ear canal normal.   Left Ear: Tympanic membrane and ear canal normal.   Nose: No mucosal edema or rhinorrhea. Mouth/Throat: Oropharynx is clear and moist and mucous membranes are normal.   Neck: Normal range of motion. Neck supple. No thyromegaly present. Cardiovascular: Normal rate and regular rhythm. No murmur heard. Pulmonary/Chest: Effort normal and breath sounds normal.   Abdominal: Soft. Bowel sounds are normal. There is no tenderness. Musculoskeletal: Normal range of motion. He exhibits no edema. Lymphadenopathy:     He has no cervical adenopathy. Neurological: He is alert and oriented to person, place, and time. Skin: Skin is warm and dry. Psychiatric: He has a normal mood and affect. Nursing note and vitals reviewed. ASSESSMENT and PLAN  Diagnoses and all orders for this visit:    1. Mixed hyperlipidemia  -     LIPID PANEL    2. NAFLD (nonalcoholic fatty liver disease)  Follow up as planned. 3. Encounter for medication monitoring  -     METABOLIC PANEL, COMPREHENSIVE    4.  Encounter for immunization  -     Influenza virus vaccine (QUADRIVALENT PRES FREE SYRINGE) IM ([id number])  -     WA IMMUNIZ ADMIN,1 SINGLE/COMB VAC/TOXOID    5. Non morbid obesity  I have reviewed/discussed the above normal BMI with the patient. I have recommended the following interventions: dietary management education, guidance, and counseling . Follow-up Disposition:  Return in about 4 months (around 3/6/2018). reviewed diet, exercise and weight control  cardiovascular risk and specific lipid/LDL goals reviewed  reviewed medications and side effects in detail  \  I have discussed diagnosis listed in this note with pt and/or family. I have discussed treatment plans and options and the risk/benefit analysis of those options, including safe use of medications and possible medication side effects. Through the use of shared decision making we have agreed to the above plan. The patient has received an after-visit summary and questions were answered concerning future plans and follow up. Advise pt of any urgent changes then to proceed to the ER.

## 2017-11-06 ENCOUNTER — OFFICE VISIT (OUTPATIENT)
Dept: FAMILY MEDICINE CLINIC | Age: 37
End: 2017-11-06

## 2017-11-06 VITALS
SYSTOLIC BLOOD PRESSURE: 128 MMHG | OXYGEN SATURATION: 96 % | HEIGHT: 65 IN | DIASTOLIC BLOOD PRESSURE: 86 MMHG | WEIGHT: 203.8 LBS | BODY MASS INDEX: 33.95 KG/M2 | TEMPERATURE: 98.3 F | HEART RATE: 78 BPM | RESPIRATION RATE: 16 BRPM

## 2017-11-06 DIAGNOSIS — K76.0 NAFLD (NONALCOHOLIC FATTY LIVER DISEASE): ICD-10-CM

## 2017-11-06 DIAGNOSIS — E78.2 MIXED HYPERLIPIDEMIA: Primary | ICD-10-CM

## 2017-11-06 DIAGNOSIS — Z23 ENCOUNTER FOR IMMUNIZATION: ICD-10-CM

## 2017-11-06 DIAGNOSIS — Z51.81 ENCOUNTER FOR MEDICATION MONITORING: ICD-10-CM

## 2017-11-06 DIAGNOSIS — E66.9 NON MORBID OBESITY: ICD-10-CM

## 2017-11-06 NOTE — PROGRESS NOTES
Chief Complaint   Patient presents with    Cholesterol Problem     6 month follow up         BMP 5/5/2017    A1C 5/5/2017    Lipid 3/3/2017     Verbal order received per Dr. Nunu Avina- flu vaccine IM- VORB    Pt received flu vaccine IM in left deltoid without any difficulty.

## 2017-11-06 NOTE — MR AVS SNAPSHOT
Visit Information Date & Time Provider Department Dept. Phone Encounter #  
 11/6/2017  7:30 AM Anastasia RivasAntonio [phone] [id number] Follow-up Instructions Return in about 4 months (around 3/6/2018). Upcoming Health Maintenance Date Due DTaP/Tdap/Td series (2 - Td) 4/2/2022 Allergies as of 11/6/2017  Review Complete On: 11/6/2017 By: Anastasia Rivas MD  
 No Known Allergies Current Immunizations  Reviewed on 11/6/2017 Name Date Influenza Vaccine 10/19/2015, 10/6/2014, 10/22/2013 Influenza Vaccine (Quad) PF 11/6/2017, 10/31/2016 TDAP Vaccine 4/2/2012 Reviewed by Chu Aguilera LPN on 52/9/9860 at  7:39 AM  
 Reviewed by Anastasia Rivas MD on 11/6/2017 at  7:50 AM  
You Were Diagnosed With   
  
 Codes Comments Mixed hyperlipidemia    -  Primary ICD-10-CM: A34.0 ICD-9-CM: 272.2 NAFLD (nonalcoholic fatty liver disease)     ICD-10-CM: K76.0 ICD-9-CM: 571.8 Encounter for medication monitoring     ICD-10-CM: Z51.81 
ICD-9-CM: V58.83 Encounter for immunization     ICD-10-CM: T19 ICD-9-CM: V03.89 Vitals BP Pulse Temp Resp Height(growth percentile) Weight(growth percentile) 128/86 (BP 1 Location: Left arm, BP Patient Position: Sitting) 78 98.3 °F (36.8 °C) (Oral) 16 5' 5\" (1.651 m) 203 lb 12.8 oz (92.4 kg) SpO2 BMI Smoking Status 96% 33.91 kg/m2 Never Smoker Vitals History BMI and BSA Data Body Mass Index Body Surface Area  
 33.91 kg/m 2 2.06 m 2 Preferred Pharmacy Pharmacy Name Phone [phone] [facility] [phone] Your Updated Medication List  
  
   
This list is accurate as of: 11/6/17  8:05 AM.  Always use your most recent med list.  
  
  
  
  
 albuterol 90 mcg/actuation inhaler Commonly known as:  PROVENTIL HFA, VENTOLIN HFA, PROAIR HFA  
 Take 2 Puffs by inhalation every four (4) hours as needed for Wheezing or Shortness of Breath. CLARITIN 10 mg tablet Generic drug:  loratadine Take 1 tablet by mouth daily as needed for Allergies. FISH OIL 1,000 mg Cap Generic drug:  omega-3 fatty acids-vitamin e Take 1 Cap by mouth two (2) times a day. naproxen 500 mg tablet Commonly known as:  NAPROSYN Take 1 Tab by mouth two (2) times daily as needed. promethazine-codeine 6.25-10 mg/5 mL syrup Commonly known as:  PHENERGAN with CODEINE Take 5 mL by mouth every six (6) hours as needed for Cough. Max Daily Amount: 20 mL. We Performed the Following INFLUENZA VIRUS VAC QUAD,SPLIT,PRESV FREE SYRINGE IM [id number] CPT(R)] LIPID PANEL [69780 CPT(R)] METABOLIC PANEL, COMPREHENSIVE [69554 CPT(R)] DC IMMUNIZ ADMIN,1 SINGLE/COMB VAC/TOXOID [id number] CPT(R)] Follow-up Instructions Return in about 4 months (around 3/6/2018). To-Do List   
 12/22/2017 1:30 PM  
  Appointment with Niels Moeller at [address] ([phone]) [facility]! Dear Mushtaq Williamson: Thank you for requesting a [employer] account. Our records indicate that you already have an active [employer] account. You can access your account anytime at [email or web address] Did you know that you can access your hospital and ER discharge instructions at any time in [employer]? You can also review all of your test results from your hospital stay or ER visit. Additional Information If you have questions, please visit the Frequently Asked Questions section of the [employer] website at [email or web address] Remember, [employer] is NOT to be used for urgent needs. For medical emergencies, dial 911. Now available from your iPhone and Android! Please provide this summary of care documentation to your next provider. Your primary care clinician is listed as myMatrixx. If you have any questions after today's visit, please call [phone].

## 2017-11-07 LAB
ALBUMIN SERPL-MCNC: 4.8 G/DL (ref 3.5–5.5)
ALBUMIN/GLOB SERPL: 1.8 {RATIO} (ref 1.2–2.2)
ALP SERPL-CCNC: 83 IU/L (ref 39–117)
ALT SERPL-CCNC: 90 IU/L (ref 0–44)
AST SERPL-CCNC: 41 IU/L (ref 0–40)
BILIRUB SERPL-MCNC: <0.2 MG/DL (ref 0–1.2)
BUN SERPL-MCNC: 12 MG/DL (ref 6–20)
BUN/CREAT SERPL: 13 (ref 9–20)
CALCIUM SERPL-MCNC: 9.7 MG/DL (ref 8.7–10.2)
CHLORIDE SERPL-SCNC: 96 MMOL/L (ref 96–106)
CHOLEST SERPL-MCNC: 214 MG/DL (ref 100–199)
CO2 SERPL-SCNC: 22 MMOL/L (ref 18–29)
CREAT SERPL-MCNC: 0.91 MG/DL (ref 0.76–1.27)
GFR SERPLBLD CREATININE-BSD FMLA CKD-EPI: 106 ML/MIN/1.73
GFR SERPLBLD CREATININE-BSD FMLA CKD-EPI: 122 ML/MIN/1.73
GLOBULIN SER CALC-MCNC: 2.6 G/DL (ref 1.5–4.5)
GLUCOSE SERPL-MCNC: 109 MG/DL (ref 65–99)
HDLC SERPL-MCNC: 29 MG/DL
INTERPRETATION, 910389: NORMAL
LDLC SERPL CALC-MCNC: ABNORMAL MG/DL (ref 0–99)
PDF IMAGE, 910387: NORMAL
POTASSIUM SERPL-SCNC: 4.3 MMOL/L (ref 3.5–5.2)
PROT SERPL-MCNC: 7.4 G/DL (ref 6–8.5)
SODIUM SERPL-SCNC: 137 MMOL/L (ref 134–144)
TRIGL SERPL-MCNC: 531 MG/DL (ref 0–149)
VLDLC SERPL CALC-MCNC: ABNORMAL MG/DL (ref 5–40)

## 2017-11-09 ENCOUNTER — TELEPHONE (OUTPATIENT)
Dept: FAMILY MEDICINE CLINIC | Age: 37
End: 2017-11-09

## 2017-11-09 RX ORDER — FENOFIBRATE 145 MG/1
145 TABLET, COATED ORAL DAILY
Qty: 30 TAB | Refills: 6 | Status: SHIPPED | OUTPATIENT
Start: 2017-11-09 | End: 2018-02-26 | Stop reason: SDUPTHER

## 2017-11-09 NOTE — TELEPHONE ENCOUNTER
Reviewed triglyceride of 531. Will restart with tricor if covered by his insurance. If not will restart Lipitor. Per hepatology no contraindication for medication for treatment of hyperlipidemia due to CALI.

## 2018-01-09 ENCOUNTER — OFFICE VISIT (OUTPATIENT)
Dept: HEMATOLOGY | Age: 38
End: 2018-01-09

## 2018-01-09 VITALS
DIASTOLIC BLOOD PRESSURE: 79 MMHG | OXYGEN SATURATION: 100 % | BODY MASS INDEX: 34.01 KG/M2 | WEIGHT: 204.4 LBS | HEART RATE: 71 BPM | TEMPERATURE: 96.8 F | SYSTOLIC BLOOD PRESSURE: 136 MMHG

## 2018-01-09 DIAGNOSIS — K76.0 NAFLD (NONALCOHOLIC FATTY LIVER DISEASE): Primary | ICD-10-CM

## 2018-01-09 NOTE — MR AVS SNAPSHOT
Visit Information Date & Time Provider Department Dept. Phone Encounter #  
 1/9/2018  9:30 AM Lefty Ospina, [address] [place] [address] [id number] Follow-up Instructions Return in about 6 months (around 7/9/2018) for Jamel Durán. Your Appointments 1/24/2018  7:45 AM  
ROUTINE CARE with Catarina Bence, MD  
[facility] [address]) Appt Note: follow up cholesterol [address] [facility] [id number] [phone] [address] [facility] [address] Upcoming Health Maintenance Date Due DTaP/Tdap/Td series (2 - Td) 4/2/2022 Allergies as of 1/9/2018  Review Complete On: 1/9/2018 By: Camelia Regalado No Known Allergies Current Immunizations  Reviewed on 11/6/2017 Name Date Influenza Vaccine 10/19/2015, 10/6/2014, 10/22/2013 Influenza Vaccine (Quad) PF 11/6/2017, 10/31/2016 TDAP Vaccine 4/2/2012 Not reviewed this visit Vitals BP Pulse Temp Weight(growth percentile) SpO2 BMI  
 136/79 (BP 1 Location: Right arm, BP Patient Position: Sitting) 71 96.8 °F (36 °C) (Tympanic) 204 lb 6.4 oz (92.7 kg) 100% 34.01 kg/m2 Smoking Status Never Smoker Vitals History BMI and BSA Data Body Mass Index Body Surface Area 34.01 kg/m 2 2.06 m 2 Preferred Pharmacy Pharmacy Name Phone [phone] [facility] [phone] Your Updated Medication List  
  
   
This list is accurate as of: 1/9/18 10:07 AM.  Always use your most recent med list.  
  
  
  
  
 albuterol 90 mcg/actuation inhaler Commonly known as:  PROVENTIL HFA, VENTOLIN HFA, PROAIR HFA Take 2 Puffs by inhalation every four (4) hours as needed for Wheezing or Shortness of Breath. CLARITIN 10 mg tablet Generic drug:  loratadine Take 1 tablet by mouth daily as needed for Allergies. fenofibrate nanocrystallized 145 mg tablet Commonly known as:  [employer] Take 1 Tab by mouth daily. Indications: hypertriglyceridemia FISH OIL 1,000 mg Cap Generic drug:  omega-3 fatty acids-vitamin e Take 1 Cap by mouth two (2) times a day. naproxen 500 mg tablet Commonly known as:  NAPROSYN Take 1 Tab by mouth two (2) times daily as needed. promethazine-codeine 6.25-10 mg/5 mL syrup Commonly known as:  PHENERGAN with CODEINE Take 5 mL by mouth every six (6) hours as needed for Cough. Max Daily Amount: 20 mL. Follow-up Instructions Return in about 6 months (around 7/9/2018) for Chintan Jones. Introducing [facility]! Dear Thuy Woods: Thank you for requesting a [employer] account. Our records indicate that you already have an active [employer] account. You can access your account anytime at [email or web address] Did you know that you can access your hospital and ER discharge instructions at any time in [employer]? You can also review all of your test results from your hospital stay or ER visit. Additional Information If you have questions, please visit the Frequently Asked Questions section of the [employer] website at [email or web address] Remember, [employer] is NOT to be used for urgent needs. For medical emergencies, dial 911. Now available from your iPhone and Android! Please provide this summary of care documentation to your next provider. Your primary care clinician is listed as Sushma Escobedo. If you have any questions after today's visit, please call [phone].

## 2018-01-09 NOTE — PROGRESS NOTES
[address] MD, Lorrie Norris, Yannick Nguyen, [address] [facility]       JUAN Gonzalez, JOSELIN Rebolledo, [facility]   JUAN Bermudez NP        at [address], [address] [address]bi Berg  22.     [phone]     FAX: [phone]    at [address], [place] [address]., [address]     [phone]     FAX: [phone]       Patient Care Team:  Mary Carmen Calle MD as PCP - General      Problem List  Date Reviewed: 11/6/2017          Codes Class Noted    NAFLD (nonalcoholic fatty liver disease) ICD-10-CM: K76.0  ICD-9-CM: 571.8  3/3/2017        Family history of early CAD ICD-10-CM: Z82.49  ICD-9-CM: V17.3  12/30/2014        Mixed hyperlipidemia ICD-10-CM: E78.2  ICD-9-CM: 272.2  8/12/2014              [facility]j [facility] for management of fatty liver disease. He has enrolled in [facility] 5 year longitudinal study. The active problem list, all pertinent past medical history, medications, liver histology, radiologic findings and laboratory findings related to the liver disorder were reviewed with the patient. The patient is a 42 y.o.  male who was first noted to have abnormalities in liver transaminases in 2013. Serologic evaluation for markers of chronic liver disease were all negative. Ultrasound of the liver was performed in 4/2017. The results of the imaging suggested chronic or fatty liver liver disease. The patient underwent a liver biopsy in 6/2017. This demonstrates NAFLD with 66-75% macro and micovesicular steatosis, no fibrosis.     The most recent laboratory studies indicate that the liver transaminases are elevated, tests of hepatic synthetic and metabolic function are normal, and the platelet count is normal.    The patient has no symptoms which could be attributed to the liver disorder. The patient completes all daily activities without any functional limitations. The patient has not experienced fatigue, pain in the right side over the liver. He has had some past success with carbohydrate-restricted diet last Spring, but had gains over the holidays. Patient is planning on reinstituting this diet this month and increasing his physical activities. ALLERGIES  No Known Allergies    MEDICATIONS  Current Outpatient Prescriptions   Medication Sig    fenofibrate nanocrystallized (TRICOR) 145 mg tablet Take 1 Tab by mouth daily. Indications: hypertriglyceridemia    naproxen (NAPROSYN) 500 mg tablet Take 1 Tab by mouth two (2) times daily as needed.  albuterol (PROVENTIL HFA, VENTOLIN HFA, PROAIR HFA) 90 mcg/actuation inhaler Take 2 Puffs by inhalation every four (4) hours as needed for Wheezing or Shortness of Breath.  promethazine-codeine (PHENERGAN WITH CODEINE) 6.25-10 mg/5 mL syrup Take 5 mL by mouth every six (6) hours as needed for Cough. Max Daily Amount: 20 mL.  loratadine (CLARITIN) 10 mg tablet Take 1 tablet by mouth daily as needed for Allergies.  omega-3 fatty acids-vitamin e (FISH OIL) 1,000 mg cap Take 1 Cap by mouth two (2) times a day. No current facility-administered medications for this visit. SYSTEM REVIEW NOT RELATED TO LIVER DISEASE OR REVIEWED ABOVE:  Constitution systems: Negative for fever, chills. Weight stable in the past 3-4 months. Eyes: Negative for visual changes. ENT: Negative for sore throat, painful swallowing. Respiratory: Negative for cough, hemoptysis, SOB. Cardiology: Negative for chest pain, palpitations. GI:  Negative for constipation or diarrhea. : Negative for urinary frequency, dysuria, hematuria, nocturia. Skin: Negative for rash. Hematology: Negative for easy bruising, blood clots. Musculo-skeletal: Negative for back pain, muscle pain, weakness.   Neurologic: Negative for headaches, dizziness, vertigo, memory problems not related to HE. Psychology: Negative for anxiety, depression. FAMILY HISTORY:  The father has the following chronic diseases: throat cancer. The mother has the following chronic diseases: fatty liver. There is no family history of liver disease. SOCIAL HISTORY:  The patient is . The patient has 1 child. The patient has never used tobacco products. The patient has never consumed significant amounts of alcohol. The patient currently works full time . PHYSICAL EXAMINATION:  /79 (BP 1 Location: Right arm, BP Patient Position: Sitting)  Pulse 71  Temp 96.8 °F (36 °C) (Tympanic)   Wt 204 lb 6.4 oz (92.7 kg)  SpO2 100%  BMI 34.01 kg/m2  General: No acute distress. Eyes: Sclera anicteric. ENT: No oral lesions. Thyroid normal.  Nodes: No adenopathy. Skin: No spider angiomata. No jaundice. No palmar erythema. Respiratory: Lungs clear to auscultation. Cardiovascular: Regular heart rate. No murmurs. No JVD. Abdomen: Soft non-tender. Liver size normal to percussion/palpation. Spleen not palpable. No obvious ascites. Extremities: No edema. No muscle wasting. No gross arthritic changes. Neurologic: Alert and oriented. Cranial nerves grossly intact. No asterixis.     LABORATORY STUDIES:  [facility] [address] 11/6/2017 5/5/2017 3/3/2017   WBC 3.4 - 10.8 x10E3/uL  6.5 5.2   ANC 1.4 - 7.0 x10E3/uL  3.5    HGB 12.6 - 17.7 g/dL  14.5 14.2    - 379 x10E3/uL  242 225   INR 0.8 - 1.2  0.9    AST 0 - 40 IU/L 41 (H) 57 (H) 48 (H)   ALT 0 - 44 IU/L 90 (H) 122 (H) 109 (H)   Alk Phos 39 - 117 IU/L 83 87 68   Bili, Total 0.0 - 1.2 mg/dL <0.2 0.2 0.4   Bili, Direct 0.00 - 0.40 mg/dL  0.09    Albumin 3.5 - 5.5 g/dL 4.8 5.0 5.2   BUN 6 - 20 mg/dL 12 18 13   Creat 0.76 - 1.27 mg/dL 0.91 0.83 0.88   Na 134 - 144 mmol/L 137 135 135   K 3.5 - 5.2 mmol/L 4.3 4.0 4.5   Cl 96 - 106 mmol/L 96 94 (L) 93 (L)   CO2 18 - 29 mmol/L 22 20 22   Glucose 65 - 99 mg/dL 109 (H) 121 (H) 91     SEROLOGIES:  Serologies Latest Ref Rng & Units 5/5/2017 3/3/2017 11/2/2016   Hep B Surface Ag Negative   Negative   Ferritin 30 - 400 ng/mL  298    Iron % Saturation 15 - 55 %  27    CHRISTI Ab, Direct Negative  Negative    ASMCA 0 - 19 Units 8     Ceruloplasmin 16.0 - 31.0 mg/dL  34.3 (H)    Alpha-1 antitrypsin level 90 - 200 mg/dL  124      LIVER HISTOLOGY:  6/2017. Slides reviewed by MLS. NAFLD. 66-75% macro and micovesicular steatosis. Mild ballooning. Moderate inflammation. No fibrosis. 1/2018. FibroScan performed at [address]. EkPa was 4.8. Suggested fibrosis level is F0-1. CAP is 383. ENDOSCOPIC PROCEDURES:  Not available or performed    RADIOLOGY:  4/2017. Ultrasound of liver. Echogenic consistent with fatty liver. No liver mass lesions. No dilated bile ducts. No ascites. OTHER TESTING:  Not available or performed    ASSESSMENT AND PLAN:  CALI with no fibrosis. Overall the biopsy can be classified as mild CALI. There is balloning of hepatocytes but this is mild. There is inflammation but this is also mild. NAFLD is very responsive to body weight and will improve if weight loss can be achieved. If the patient loses ~10% of current body weight, which is down to a target weight of 190 pounds, inflammation will likely resolve. If the patient remains at current weight or gains weight CALI will progress, more fibrosis will develop and he will be at risk to develop cirrhosis. Liver transaminases are elevated. Alkaline phosphate is normal.  Liver function is normal.  The platelet count is normal.  These values were drawn at his annual physical in 11/2017 and will not be repeated at this time. The patient was counseled regarding diet and exercise to achieve weight loss. The best diet for patients with fatty liver is one very low in carbohydrates and enriched with protein. This has worked for him in the past and he is planning to restart this diet. Patient has signed consent today for participation in the 5 year longitudinal clinical trial TARGET-CALI. He understands that this natural history study will follow his standard of care. He consented to optional biorepository lab draw and as we were not planning on drawing labs today, will obtain this lab draw at his return in 6 months. Copy of consent was provided to the patient. We will continue to monitor the patient at periodic intervals. If weight loss is successful the fat will resolve from the liver and liver enzymes should return to the normal range. We would then repeat an ultrasound to see if this also returns to normal.  If liver enzymes do not return to normal with weight reduction additional evaluation may be necessary over time and we will plan to follow with repeat fibroscan in the future as indicated. The patient was directed to continue all current medications at the current dosages. There are no contraindications for the patient to take any medications that are necessary for treatment of other medical issues including medications for diabetes mellitus and hypercholesterolemia. The patient was counseled regarding alcohol consumption and that this could contribute to fatty liver disease. The need for vaccination against viral hepatitis A and B will be assessed with serologic and instituted as appropriate upon next lab draw. All of the above issues were discussed with the patient. All questions were answered. The patient expressed a clear understanding of the above. [facility] 87 in 6 months.     Reji Peterson PA-C  [facility] [place] [address], [address] [place]bi Berg  22.  [phone]

## 2018-01-09 NOTE — PROGRESS NOTES
1. Have you been to the ER, urgent care clinic since your last visit? Hospitalized since your last visit? No    2. Have you seen or consulted any other health care providers outside of the [address] [place] [address] since your last visit? Include any pap smears or colon screening.  No   Chief Complaint   Patient presents with    Follow-up     Fibroscan      Visit Vitals    /79 (BP 1 Location: Right arm, BP Patient Position: Sitting)    Pulse 71    Temp 96.8 °F (36 °C) (Tympanic)    Wt 204 lb 6.4 oz (92.7 kg)    SpO2 100%    BMI 34.01 kg/m2     PHQ over the last two weeks 1/9/2018   Little interest or pleasure in doing things Not at all   Feeling down, depressed or hopeless Not at all   Total Score PHQ 2 0     Learning Assessment 1/9/2018   PRIMARY LEARNER Patient   HIGHEST LEVEL OF EDUCATION - PRIMARY LEARNER  -   BARRIERS PRIMARY LEARNER -   CO-LEARNER CAREGIVER -   PRIMARY LANGUAGE ENGLISH   LEARNER PREFERENCE PRIMARY LISTENING   ANSWERED BY patient    RELATIONSHIP SELF

## 2018-01-23 NOTE — PROGRESS NOTES
HISTORY OF PRESENT ILLNESS  Jony Thao is a 42 y.o. male. HPI   For follow up on cholesterol. Feeling well. Hypercholesterolemia follow up:  Compliant w/ low fat, low cholesterol diet. Tolerating Tricor. Exercising some but planning to increase this and working on weight loss. No muscle nor abdominal pain, no skin discoloration. Has FH of early CAD. Has had negative CT heart scan with calcium score of zero March 2015. Has NAFLD. Being followed by hepatology. Overall liver enzymes elevations have been stable. Glucose intolerance reveiw:  She has IGT. Diabetic ROS - further diabetic ROS: no polyuria or polydipsia, no chest pain, dyspnea or TIA's, no numbness, tingling or pain in extremities, no unusual visual symptoms. Lab review: orders written for new lab studies as appropriate; see orders. Patient Active Problem List   Diagnosis Code    Mixed hyperlipidemia E78.2    Family history of early CAD Z80.55    NAFLD (nonalcoholic fatty liver disease) K76.0       Current Outpatient Prescriptions   Medication Sig Dispense Refill    fenofibrate nanocrystallized (TRICOR) 145 mg tablet Take 1 Tab by mouth daily. Indications: hypertriglyceridemia 30 Tab 6    naproxen (NAPROSYN) 500 mg tablet Take 1 Tab by mouth two (2) times daily as needed. 30 Tab 1    albuterol (PROVENTIL HFA, VENTOLIN HFA, PROAIR HFA) 90 mcg/actuation inhaler Take 2 Puffs by inhalation every four (4) hours as needed for Wheezing or Shortness of Breath. 1 Inhaler 1    promethazine-codeine (PHENERGAN WITH CODEINE) 6.25-10 mg/5 mL syrup Take 5 mL by mouth every six (6) hours as needed for Cough. Max Daily Amount: 20 mL. 240 mL 0    loratadine (CLARITIN) 10 mg tablet Take 1 tablet by mouth daily as needed for Allergies. 30 tablet 11    omega-3 fatty acids-vitamin e (FISH OIL) 1,000 mg cap Take 1 Cap by mouth two (2) times a day.          No Known Allergiesory  Past Medical History:   Diagnosis Date    FH: CAD (coronary artery disease)     History of liver biopsy 05/2017    Hypercholesterolemia     Ill-defined condition     High cholesterol   :   Procedure Laterality Date    HX VASECTOMY  12/2009    HX VASECTOMY      HX WISDOM TEETH EXTRACTION                               Hypertension Father     Heart Attack Father     Hypertension Mother     High Cholesterol Mother     No Known Problems Brother        Social History   Substance Use Topics    Smoking status: Never Smoker    Smokeless tobacco: Never Used    Alcohol use No        Lab Results   Component Value Date/Time    WBC 6.5 05/05/2017 04:15 PM    HGB 14.5 05/05/2017 04:15 PM    HCT 42.3 05/05/2017 04:15 PM    PLATELET 736 82/39/2429 04:15 PM    MCV 86 05/05/2017 04:15 PM       Lab Results   Component Value Date/Time    Cholesterol, total 214 11/06/2017 08:00 AM    HDL Cholesterol 29 11/06/2017 08:00 AM    LDL, calculated Comment 11/06/2017 08:00 AM    Triglyceride 531 11/06/2017 08:00 AM    CHOL/HDL Ratio 5.3 09/16/2009 03:29 PM       Lab Results   Component Value Date/Time    Sodium 137 11/06/2017 08:00 AM    Potassium 4.3 11/06/2017 08:00 AM    Chloride 96 11/06/2017 08:00 AM    CO2 22 11/06/2017 08:00 AM    Anion gap 8 09/16/2009 03:29 PM    Glucose 109 11/06/2017 08:00 AM    BUN 12 11/06/2017 08:00 AM    Creatinine 0.91 11/06/2017 08:00 AM    BUN/Creatinine ratio 13 11/06/2017 08:00 AM    GFR est  11/06/2017 08:00 AM    GFR est non- 11/06/2017 08:00 AM    Calcium 9.7 11/06/2017 08:00 AM    Bilirubin, total <0.2 11/06/2017 08:00 AM    ALT (SGPT) 90 11/06/2017 08:00 AM    AST (SGOT) 41 11/06/2017 08:00 AM    Alk.  phosphatase 83 11/06/2017 08:00 AM    Protein, total 7.4 11/06/2017 08:00 AM    Albumin 4.8 11/06/2017 08:00 AM    Globulin 3.1 09/16/2009 03:29 PM    A-G Ratio 1.8 11/06/2017 08:00 AM      Lab Results   Component Value Date/Time    Hemoglobin A1c 5.8 05/05/2017 04:15 PM    Hemoglobin A1c (POC) 5.6 08/15/2016 10:45 AM         Review of Systems   Constitutional: Negative for malaise/fatigue. HENT: Negative for congestion. Eyes: Negative for blurred vision. Respiratory: Negative for cough and shortness of breath. Cardiovascular: Negative for chest pain, palpitations and leg swelling. Gastrointestinal: Negative for abdominal pain, constipation and heartburn. Genitourinary: Negative for dysuria, frequency and urgency. Musculoskeletal: Negative for back pain and joint pain. Neurological: Negative for dizziness, tingling and headaches. Endo/Heme/Allergies: Negative for environmental allergies. Psychiatric/Behavioral: Negative for depression. The patient does not have insomnia. Physical Exam   Constitutional: He is oriented to person, place, and time. He appears well-developed and well-nourished. /64 (BP 1 Location: Left arm, BP Patient Position: Sitting)  Pulse 74  Temp 97.2 °F (36.2 °C) (Oral)   Resp 16  Ht 5' 5\" (1.651 m)  Wt 207 lb (93.9 kg)  SpO2 98%  BMI 34.45 kg/m2  HENT:   Right Ear: Tympanic membrane and ear canal normal.   Left Ear: Tympanic membrane and ear canal normal.   Nose: No mucosal edema or rhinorrhea. Mouth/Throat: Oropharynx is clear and moist and mucous membranes are normal.   Neck: Normal range of motion. Neck supple. No thyromegaly present. Cardiovascular: Normal rate and regular rhythm. No murmur heard. Pulmonary/Chest: Effort normal and breath sounds normal.   Abdominal: Soft. Bowel sounds are normal. There is no tenderness. Musculoskeletal: Normal range of motion. He exhibits no edema. Lymphadenopathy:     He has no cervical adenopathy. Neurological: He is alert and oriented to person, place, and time. Skin: Skin is warm and dry. Psychiatric: He has a normal mood and affect. Nursing note and vitals reviewed. ASSESSMENT and PLAN  Diagnoses and all orders for this visit:    1. Mixed hyperlipidemia  -     LIPID PANEL    2.  NAFLD (nonalcoholic fatty liver disease)  Stable     3. IGT (impaired glucose tolerance)  Continue to monitor. Work on diet and exercise. 4. Encounter for medication monitoring  -     METABOLIC PANEL, COMPREHENSIVE      Follow-up Disposition:  Return in about 6 months (around 7/24/2018). reviewed diet, exercise and weight control  cardiovascular risk and specific lipid/LDL goals reviewed  reviewed medications and side effects in detail  specific diabetic recommendations: low cholesterol diet, weight control and daily exercise discussed and glycohemoglobin and other lab monitoring discussed     I have discussed diagnosis listed in this note with pt and/or family. I have discussed treatment plans and options and the risk/benefit analysis of those options, including safe use of medications and possible medication side effects. Through the use of shared decision making we have agreed to the above plan. The patient has received an after-visit summary and questions were answered concerning future plans and follow up. Advise pt of any urgent changes then to proceed to the ER.

## 2018-01-24 ENCOUNTER — OFFICE VISIT (OUTPATIENT)
Dept: FAMILY MEDICINE CLINIC | Age: 38
End: 2018-01-24

## 2018-01-24 VITALS
DIASTOLIC BLOOD PRESSURE: 64 MMHG | WEIGHT: 207 LBS | SYSTOLIC BLOOD PRESSURE: 124 MMHG | HEIGHT: 65 IN | TEMPERATURE: 97.2 F | HEART RATE: 74 BPM | RESPIRATION RATE: 16 BRPM | BODY MASS INDEX: 34.49 KG/M2 | OXYGEN SATURATION: 98 %

## 2018-01-24 DIAGNOSIS — K76.0 NAFLD (NONALCOHOLIC FATTY LIVER DISEASE): ICD-10-CM

## 2018-01-24 DIAGNOSIS — Z51.81 ENCOUNTER FOR MEDICATION MONITORING: ICD-10-CM

## 2018-01-24 DIAGNOSIS — R73.02 IGT (IMPAIRED GLUCOSE TOLERANCE): ICD-10-CM

## 2018-01-24 DIAGNOSIS — E78.2 MIXED HYPERLIPIDEMIA: Primary | ICD-10-CM

## 2018-01-24 NOTE — PROGRESS NOTES
Neelima Johnson is a 42 y.o. male  Chief Complaint   Patient presents with    Cholesterol Problem       Visit Vitals    /64 (BP 1 Location: Left arm, BP Patient Position: Sitting)    Pulse 74    Temp 97.2 °F (36.2 °C) (Oral)    Resp 16    Ht 5' 5\" (1.651 m)    Wt 207 lb (93.9 kg)    SpO2 98%    BMI 34.45 kg/m2     1. Have you been to the ER, urgent care clinic since your last visit? Hospitalized since your last visit? No    2. Have you seen or consulted any other health care providers outside of the [address] [place] [address] since your last visit? Include any pap smears or colon screening.  No

## 2018-01-24 NOTE — MR AVS SNAPSHOT
[address] [facility] 
 
 
 [address] [id number] 
[phone] Patient: Charlie Ortega MRN: [id number] Visit Information Date & Time Provider Department Dept. Phone Encounter #  
 1/24/2018  7:45 AM Luis Sam MD [facility] [phone] [id number] Follow-up Instructions Return in about 6 months (around 7/24/2018). Your Appointments 6/8/2018  8:00 AM  
Follow Up with Gregery Cranker, PA [address] ([facility]) Appt Note: Follow up [address] [facility] [address] [phone] [facility] [id number]  
[address] [facility] [address] Upcoming Health Maintenance Date Due DTaP/Tdap/Td series (2 - Td) 4/2/2022 Allergies as of 1/24/2018  Review Complete On: 1/24/2018 By: Luis Sam MD  
 No Known Allergies Current Immunizations  Reviewed on 1/24/2018 Name Date Influenza Vaccine 10/19/2015, 10/6/2014, 10/22/2013 Influenza Vaccine (Quad) PF 11/6/2017, 10/31/2016 TDAP Vaccine 4/2/2012 Reviewed by Mariia Mejia LPN on 9/70/9618 at  7:46 AM  
 Reviewed by Luis Sam MD on 1/24/2018 at  7:59 AM  
You Were Diagnosed With   
  
 Codes Comments Mixed hyperlipidemia    -  Primary ICD-10-CM: W13.2 ICD-9-CM: 272.2 NAFLD (nonalcoholic fatty liver disease)     ICD-10-CM: K76.0 ICD-9-CM: 571.8 Encounter for medication monitoring     ICD-10-CM: Z51.81 
ICD-9-CM: V58.83 Vitals BP Pulse Temp Resp Height(growth percentile) Weight(growth percentile) 124/64 (BP 1 Location: Left arm, BP Patient Position: Sitting) 74 97.2 °F (36.2 °C) (Oral) 16 5' 5\" (1.651 m) 207 lb (93.9 kg) SpO2 BMI Smoking Status 98% 34.45 kg/m2 Never Smoker Vitals History BMI and BSA Data Body Mass Index Body Surface Area 34.45 kg/m 2 2.08 m 2 Preferred Pharmacy Pharmacy Name Phone [id number] [id number] [facility] [phone] Your Updated Medication List  
  
   
This list is accurate as of: 1/24/18  8:05 AM.  Always use your most recent med list.  
  
  
  
  
 CLARITIN 10 mg tablet Generic drug:  loratadine Take 1 tablet by mouth daily as needed for Allergies. fenofibrate nanocrystallized 145 mg tablet Commonly known as:  [employer] Take 1 Tab by mouth daily. Indications: hypertriglyceridemia FISH OIL 1,000 mg Cap Generic drug:  omega-3 fatty acids-vitamin e Take 1 Cap by mouth two (2) times a day. naproxen 500 mg tablet Commonly known as:  NAPROSYN Take 1 Tab by mouth two (2) times daily as needed. promethazine-codeine 6.25-10 mg/5 mL syrup Commonly known as:  PHENERGAN with CODEINE Take 5 mL by mouth every six (6) hours as needed for Cough. Max Daily Amount: 20 mL. We Performed the Following LIPID PANEL [93428 CPT(R)] METABOLIC PANEL, COMPREHENSIVE [03029 CPT(R)] Follow-up Instructions Return in about 6 months (around 7/24/2018). Introducing [facility] & HEALTH SERVICES! Dear Leonel Re: Thank you for requesting a [employer] account. Our records indicate that you already have an active [employer] account. You can access your account anytime at [email or web address] Did you know that you can access your hospital and ER discharge instructions at any time in [employer]? You can also review all of your test results from your hospital stay or ER visit. Additional Information If you have questions, please visit the Frequently Asked Questions section of the [employer] website at [email or web address] Remember, [employer] is NOT to be used for urgent needs. For medical emergencies, dial 911. Now available from your iPhone and Android! Please provide this summary of care documentation to your next provider. Your primary care clinician is listed as Tavia Panda. If you have any questions after today's visit, please call [phone].

## 2018-01-25 LAB
ALBUMIN SERPL-MCNC: 5.2 G/DL (ref 3.5–5.5)
ALBUMIN/GLOB SERPL: 2.3 {RATIO} (ref 1.2–2.2)
ALP SERPL-CCNC: 53 IU/L (ref 39–117)
ALT SERPL-CCNC: 73 IU/L (ref 0–44)
AST SERPL-CCNC: 36 IU/L (ref 0–40)
BILIRUB SERPL-MCNC: 0.3 MG/DL (ref 0–1.2)
BUN SERPL-MCNC: 16 MG/DL (ref 6–20)
BUN/CREAT SERPL: 17 (ref 9–20)
CALCIUM SERPL-MCNC: 10 MG/DL (ref 8.7–10.2)
CHLORIDE SERPL-SCNC: 98 MMOL/L (ref 96–106)
CHOLEST SERPL-MCNC: 224 MG/DL (ref 100–199)
CO2 SERPL-SCNC: 20 MMOL/L (ref 18–29)
CREAT SERPL-MCNC: 0.94 MG/DL (ref 0.76–1.27)
GFR SERPLBLD CREATININE-BSD FMLA CKD-EPI: 102 ML/MIN/1.73
GFR SERPLBLD CREATININE-BSD FMLA CKD-EPI: 118 ML/MIN/1.73
GLOBULIN SER CALC-MCNC: 2.3 G/DL (ref 1.5–4.5)
GLUCOSE SERPL-MCNC: 104 MG/DL (ref 65–99)
HDLC SERPL-MCNC: 40 MG/DL
INTERPRETATION, 910389: NORMAL
LDLC SERPL CALC-MCNC: 149 MG/DL (ref 0–99)
POTASSIUM SERPL-SCNC: 4.6 MMOL/L (ref 3.5–5.2)
PROT SERPL-MCNC: 7.5 G/DL (ref 6–8.5)
SODIUM SERPL-SCNC: 136 MMOL/L (ref 134–144)
TRIGL SERPL-MCNC: 177 MG/DL (ref 0–149)
VLDLC SERPL CALC-MCNC: 35 MG/DL (ref 5–40)

## 2018-02-26 DIAGNOSIS — E78.2 MIXED HYPERLIPIDEMIA: ICD-10-CM

## 2018-02-26 RX ORDER — FENOFIBRATE 145 MG/1
145 TABLET, COATED ORAL DAILY
Qty: 90 TAB | Refills: 3 | Status: SHIPPED | OUTPATIENT
Start: 2018-02-26 | End: 2019-03-08 | Stop reason: SDUPTHER

## 2018-06-04 ENCOUNTER — OFFICE VISIT (OUTPATIENT)
Dept: HEMATOLOGY | Age: 38
End: 2018-06-04

## 2018-06-04 VITALS
SYSTOLIC BLOOD PRESSURE: 133 MMHG | OXYGEN SATURATION: 98 % | DIASTOLIC BLOOD PRESSURE: 78 MMHG | HEART RATE: 63 BPM | BODY MASS INDEX: 34.45 KG/M2 | WEIGHT: 207 LBS | TEMPERATURE: 97.3 F

## 2018-06-04 DIAGNOSIS — K76.0 NAFLD (NONALCOHOLIC FATTY LIVER DISEASE): Primary | ICD-10-CM

## 2018-06-04 NOTE — PROGRESS NOTES
1. Have you been to the ER, urgent care clinic since your last visit? Hospitalized since your last visit? No    2. Have you seen or consulted any other health care providers outside of the [address] [place] [address] since your last visit? Include any pap smears or colon screening. No   Chief Complaint   Patient presents with    Follow-up     Visit Vitals    /78 (BP 1 Location: Left arm, BP Patient Position: Sitting)    Pulse 63    Temp 97.3 °F (36.3 °C) (Tympanic)    Wt 207 lb (93.9 kg)    SpO2 98%    BMI 34.45 kg/m2     PHQ over the last two weeks 6/4/2018   Little interest or pleasure in doing things Not at all   Feeling down, depressed or hopeless Not at all   Total Score PHQ 2 0     Learning Assessment 6/4/2018   PRIMARY LEARNER Patient   HIGHEST LEVEL OF EDUCATION - PRIMARY LEARNER  -   BARRIERS PRIMARY LEARNER NONE   CO-LEARNER CAREGIVER No   PRIMARY LANGUAGE ENGLISH   LEARNER PREFERENCE PRIMARY LISTENING   ANSWERED BY patient    RELATIONSHIP SELF     Abuse Screening Questionnaire 6/4/2018   Do you ever feel afraid of your partner? N   Are you in a relationship with someone who physically or mentally threatens you? N   Is it safe for you to go home?  Cayetano Primrose

## 2018-06-04 NOTE — MR AVS SNAPSHOT
[address] [facility] [address] [phone] [address] [facility] [address] 
[phone] Patient: Areli Khanna MRN:  [id number] Visit Information Date & Time Provider Department Dept. Phone Encounter #  
 6/4/2018  1:30 PM Deja Antunez, [address] [address] [id number] Follow-up Instructions Return in about 6 months (around 12/4/2018) for Zhao Babcock. Your Appointments 7/24/2018  7:45 AM  
ROUTINE CARE with Linda Silva MD  
[facility] Appt Note: 6mth f/u  
 [address] [id number] [phone] [address] [facility] [address]  
  
    
 12/4/2018  2:00 PM  
Follow Up with ANAM Durantfnarjul[address] 75 ([facility]) Appt Note: Follow up [address] [facility] [address] [phone] [facility] [address]  
[address] [facility] [address] Upcoming Health Maintenance Date Due Influenza Age 5 to Adult 8/1/2018 DTaP/Tdap/Td series (2 - Td) 4/2/2022 Allergies as of 6/4/2018  Review Complete On: 6/4/2018 By: ANAM Durant No Known Allergies Current Immunizations  Reviewed on 1/24/2018 Name Date Influenza Vaccine 10/19/2015, 10/6/2014, 10/22/2013 Influenza Vaccine (Quad) PF 11/6/2017, 10/31/2016 TDAP Vaccine 4/2/2012 Not reviewed this visit You Were Diagnosed With   
  
 Codes Comments NAFLD (nonalcoholic fatty liver disease)    -  Primary ICD-10-CM: K76.0 ICD-9-CM: 571.8 Vitals BP Pulse Temp Weight(growth percentile) SpO2 BMI  
 133/78 (BP 1 Location: Left arm, BP Patient Position: Sitting) 63 97.3 °F (36.3 °C) (Tympanic) 207 lb (93.9 kg) 98% 34.45 kg/m2 Smoking Status Never Smoker BMI and BSA Data Body Mass Index Body Surface Area 34.45 kg/m 2 2.08 m 2 Preferred Pharmacy Pharmacy Name Phone Meg Mckeon, [facility] [phone] Your Updated Medication List  
  
   
This list is accurate as of 6/4/18  1:56 PM.  Always use your most recent med list.  
  
  
  
  
 fenofibrate nanocrystallized 145 mg tablet Commonly known as:  [employer] Take 1 Tab by mouth daily. Indications: hypertriglyceridemia FISH OIL 1,000 mg Cap Generic drug:  omega-3 fatty acids-vitamin e Take 1 Cap by mouth two (2) times a day. We Performed the Following HEPATIC FUNCTION PANEL (6) [[id number] Custom] Follow-up Instructions Return in about 6 months (around 12/4/2018) for Ashleyolga Shabazz. Introducing [facility] & HEALTH SERVICES! Dear Prema Varma: Thank you for requesting a [employer] account. Our records indicate that you already have an active [employer] account. You can access your account anytime at [email or web address] Did you know that you can access your hospital and ER discharge instructions at any time in [employer]? You can also review all of your test results from your hospital stay or ER visit. Additional Information If you have questions, please visit the Frequently Asked Questions section of the [employer] website at [email or web address] Remember, [employer] is NOT to be used for urgent needs. For medical emergencies, dial 911. Now available from your iPhone and Android! Please provide this summary of care documentation to your next provider. Your primary care clinician is listed as Kassy Richey. If you have any questions after today's visit, please call [phone].

## 2018-06-04 NOTE — PROGRESS NOTES
[address] Hamm MD, [place], [facility] [place] [facility] [place]       JUAN Franklin PA-C Otha Cummins, [facility]   JUAN Diallo NP        at [address], [address] Debbi Berg  22.     [phone]     FAX: [phone]    at [address], [address]     [phone]     FAX: [phone]       Patient Care Team:  Raine Reddy MD as PCP - General      Problem List  Date Reviewed: 1/24/2018          Codes Class Noted    IGT (impaired glucose tolerance) ICD-10-CM: R73.02  ICD-9-CM: 790.22  1/24/2018        NAFLD (nonalcoholic fatty liver disease) ICD-10-CM: K76.0  ICD-9-CM: 571.8  3/3/2017        Family history of early CAD ICD-10-CM: Z82.49  ICD-9-CM: V17.3  12/30/2014        Mixed hyperlipidemia ICD-10-CM: E78.2  ICD-9-CM: 272.2  8/12/2014              [facility] [place] for management of fatty liver disease. He has enrolled in [facility] 5 year longitudinal study. The active problem list, all pertinent past medical history, medications, liver histology, radiologic findings and laboratory findings related to the liver disorder were reviewed with the patient. The patient is a 34 y.o.  male who was first noted to have abnormalities in liver transaminases in 2013. Serologic evaluation for markers of chronic liver disease were all negative. Ultrasound of the liver was performed in 4/2017. The results of the imaging suggested chronic or fatty liver liver disease. The patient underwent a liver biopsy in 6/2017. This demonstrates NAFLD with 66-75% macro and micovesicular steatosis, no fibrosis.     The most recent laboratory studies indicate that the liver transaminases are elevated, tests of hepatic synthetic and metabolic function are normal, and the platelet count is normal.    The patient has no symptoms which could be attributed to the liver disorder. The patient completes all daily activities without any functional limitations. The patient has not experienced fatigue, pain in the right side over the liver. He has had some past success with carbohydrate-restricted diet last Spring, but had gains over the holidays. Patient is planning on reinstituting this diet this Summer and increasing his physical activities. He has had essentially no change in weight since his last office visit. ALLERGIES  No Known Allergies    MEDICATIONS  Current Outpatient Prescriptions   Medication Sig    fenofibrate nanocrystallized (TRICOR) 145 mg tablet Take 1 Tab by mouth daily. Indications: hypertriglyceridemia    omega-3 fatty acids-vitamin e (FISH OIL) 1,000 mg cap Take 1 Cap by mouth two (2) times a day.  naproxen (NAPROSYN) 500 mg tablet Take 1 Tab by mouth two (2) times daily as needed.  promethazine-codeine (PHENERGAN WITH CODEINE) 6.25-10 mg/5 mL syrup Take 5 mL by mouth every six (6) hours as needed for Cough. Max Daily Amount: 20 mL.  loratadine (CLARITIN) 10 mg tablet Take 1 tablet by mouth daily as needed for Allergies. No current facility-administered medications for this visit. SYSTEM REVIEW NOT RELATED TO LIVER DISEASE OR REVIEWED ABOVE:  Constitution systems: Negative for fever, chills. Weight stable in the past 6-8 months. Eyes: Negative for visual changes. ENT: Negative for sore throat, painful swallowing. Respiratory: Negative for cough, hemoptysis, SOB. Cardiology: Negative for chest pain, palpitations. GI:  Negative for constipation or diarrhea. : Negative for urinary frequency, dysuria, hematuria, nocturia. Skin: Negative for rash. Hematology: Negative for easy bruising, blood clots. Musculo-skeletal: Negative for back pain, muscle pain, weakness.   Neurologic: Negative for headaches, dizziness, vertigo, memory problems not related to HE. Psychology: Negative for anxiety, depression. FAMILY HISTORY:  The father has the following chronic diseases: throat cancer. The mother has the following chronic diseases: fatty liver. There is no family history of liver disease. SOCIAL HISTORY:  The patient is . The patient has 1 child. The patient has never used tobacco products. The patient has never consumed significant amounts of alcohol. The patient currently works full time . PHYSICAL EXAMINATION:  /78 (BP 1 Location: Left arm, BP Patient Position: Sitting)  Pulse 63  Temp 97.3 °F (36.3 °C) (Tympanic)   Wt 207 lb (93.9 kg)  SpO2 98%  BMI 34.45 kg/m2  General: No acute distress. Eyes: Sclera anicteric. ENT: No oral lesions. Thyroid normal.  Nodes: No adenopathy. Skin: No spider angiomata. No jaundice. No palmar erythema. Respiratory: Lungs clear to auscultation. Cardiovascular: Regular heart rate. No murmurs. No JVD. Abdomen: Soft non-tender. Liver size normal to percussion/palpation. Spleen not palpable. No obvious ascites. Extremities: No edema. No muscle wasting. No gross arthritic changes. Neurologic: Alert and oriented. Cranial nerves grossly intact. No asterixis.     LABORATORY STUDIES:  Liver [place] [address] 1/24/2018 11/6/2017 5/5/2017   WBC 3.4 - 10.8 x10E3/uL   6.5   ANC 1.4 - 7.0 x10E3/uL   3.5   HGB 12.6 - 17.7 g/dL   14.5    - 379 x10E3/uL   242   INR 0.8 - 1.2   0.9   AST 0 - 40 IU/L 36 41 (H) 57 (H)   ALT 0 - 44 IU/L 73 (H) 90 (H) 122 (H)   Alk Phos 39 - 117 IU/L 53 83 87   Bili, Total 0.0 - 1.2 mg/dL 0.3 <0.2 0.2   Bili, Direct 0.00 - 0.40 mg/dL   0.09   Albumin 3.5 - 5.5 g/dL 5.2 4.8 5.0   BUN 6 - 20 mg/dL 16 12 18   Creat 0.76 - 1.27 mg/dL 0.94 0.91 0.83   Na 134 - 144 mmol/L 136 137 135   K 3.5 - 5.2 mmol/L 4.6 4.3 4.0   Cl 96 - 106 mmol/L 98 96 94 (L)   CO2 18 - 29 mmol/L 20 22 20 Glucose 65 - 99 mg/dL 104 (H) 109 (H) 121 (H)   Additional lab values drawn at today's office visit are pending at the time of documentation. SEROLOGIES:  Serologies Latest Ref Rng & Units 5/5/2017 3/3/2017 11/2/2016   Hep B Surface Ag Negative   Negative   Ferritin 30 - 400 ng/mL  298    Iron % Saturation 15 - 55 %  27    CHRISTI Ab, Direct Negative  Negative    ASMCA 0 - 19 Units 8     Ceruloplasmin 16.0 - 31.0 mg/dL  34.3 (H)    Alpha-1 antitrypsin level 90 - 200 mg/dL  124      LIVER HISTOLOGY:  6/2017. Slides reviewed by MLS. NAFLD. 66-75% macro and micovesicular steatosis. Mild ballooning. Moderate inflammation. No fibrosis. 1/2018. FibroScan performed at [address]. EkPa was 4.8. Suggested fibrosis level is F0-1. CAP is 383. ENDOSCOPIC PROCEDURES:  Not available or performed    RADIOLOGY:  4/2017. Ultrasound of liver. Echogenic consistent with fatty liver. No liver mass lesions. No dilated bile ducts. No ascites. OTHER TESTING:  Not available or performed    ASSESSMENT AND PLAN:  CALI with no fibrosis. Overall the biopsy can be classified as mild CALI. There is balloning of hepatocytes but this is mild. There is inflammation but this is also mild. NAFLD is very responsive to body weight and will improve if weight loss can be achieved. If the patient loses ~10% of current body weight, which is down to a target weight of 190 pounds, inflammation will likely resolve. If the patient remains at current weight or gains weight CALI will progress, more fibrosis will develop and he will be at risk to develop cirrhosis. Liver transaminases are elevated. Alkaline phosphate is normal.  Liver function is normal.  The platelet count is normal.     The patient was counseled regarding diet and exercise to achieve weight loss. The best diet for patients with fatty liver is one very low in carbohydrates and enriched with protein.    This has worked for him in the past and he is planning to restart this diet. We are hoping that he can lose 12-15 # by the time of his return office visit. Patient has signed consent today for participation in the 5 year longitudinal clinical trial TARGET-CALI. He understands that this natural history study will follow his standard of care. He consented to optional biorepository lab draw and this will be drawn today as part of his office visit. We will continue to monitor the patient at periodic intervals. If weight loss is successful the fat will resolve from the liver and liver enzymes should return to the normal range. We would then repeat an ultrasound to see if this also returns to normal.  If liver enzymes do not return to normal with weight reduction additional evaluation may be necessary over time and we will plan to follow with repeat fibroscan in the future as indicated. The patient was directed to continue all current medications at the current dosages. There are no contraindications for the patient to take any medications that are necessary for treatment of other medical issues including medications for diabetes mellitus and hypercholesterolemia. The patient was counseled regarding alcohol consumption and that this could contribute to fatty liver disease. The need for vaccination against viral hepatitis A and B will be assessed with serologic and instituted as appropriate upon next lab draw. All of the above issues were discussed with the patient. All questions were answered. The patient expressed a clear understanding of the above. [facility] months.     Roberto Alvarado PA-C  Liver [place] [address], [address] [facility], [facility] 22.  [phone]

## 2018-06-05 LAB
ALBUMIN SERPL-MCNC: 5.2 G/DL (ref 3.5–5.5)
ALP SERPL-CCNC: 53 IU/L (ref 39–117)
ALT SERPL-CCNC: 109 IU/L (ref 0–44)
AST SERPL-CCNC: 49 IU/L (ref 0–40)
BILIRUB DIRECT SERPL-MCNC: 0.09 MG/DL (ref 0–0.4)
BILIRUB SERPL-MCNC: <0.2 MG/DL (ref 0–1.2)

## 2018-08-29 ENCOUNTER — OFFICE VISIT (OUTPATIENT)
Dept: FAMILY MEDICINE CLINIC | Age: 38
End: 2018-08-29

## 2018-08-29 VITALS
HEART RATE: 76 BPM | WEIGHT: 206 LBS | BODY MASS INDEX: 34.32 KG/M2 | HEIGHT: 65 IN | OXYGEN SATURATION: 98 % | RESPIRATION RATE: 16 BRPM | DIASTOLIC BLOOD PRESSURE: 72 MMHG | TEMPERATURE: 98.2 F | SYSTOLIC BLOOD PRESSURE: 120 MMHG

## 2018-08-29 DIAGNOSIS — Z51.81 ENCOUNTER FOR MEDICATION MONITORING: ICD-10-CM

## 2018-08-29 DIAGNOSIS — Z80.42 FAMILY HISTORY OF PROSTATE CANCER IN FATHER: ICD-10-CM

## 2018-08-29 DIAGNOSIS — R73.02 IGT (IMPAIRED GLUCOSE TOLERANCE): ICD-10-CM

## 2018-08-29 DIAGNOSIS — E78.2 MIXED HYPERLIPIDEMIA: Primary | ICD-10-CM

## 2018-08-29 DIAGNOSIS — K76.0 NAFLD (NONALCOHOLIC FATTY LIVER DISEASE): ICD-10-CM

## 2018-08-29 LAB — HBA1C MFR BLD HPLC: 5.6 %

## 2018-08-29 NOTE — PROGRESS NOTES
Identified pt with two pt identifiers(name and ). Reviewed record in preparation for visit and have obtained necessary documentation. Chief Complaint Patient presents with  Cholesterol Problem 6 month follow up Health Maintenance Due Topic  Influenza Age 5 to Adult Visit Vitals  /72 (BP 1 Location: Left arm, BP Patient Position: Sitting)  Pulse 76  Temp 98.2 °F (36.8 °C) (Oral)  Resp 16  
 Ht 5' 5\" (1.651 m)  Wt 206 lb (93.4 kg)  SpO2 98%  BMI 34.28 kg/m2 Coordination of Care Questionnaire: 
:  
1) Have you been to an emergency room, urgent care clinic since your last visit? no  
Hospitalized since your last visit? no          
 
2. Have seen or consulted any other health care provider since your last visit? NO If yes, where when, and reason for visit? 3) Do you have an Advanced Directive/ Living Will in place? NO If yes, do we have a copy on file NO If no, would you like information NO Patient is accompanied by self I have received verbal consent from Otilia Garibay to discuss any/all medical information while they are present in the room.  
 
Bettie Mcrae LPN

## 2018-08-29 NOTE — MR AVS SNAPSHOT
[address] [facility] 
 
 
 [address] [facility] [address] [address] [address] 
[phone] Patient: Mj Smith MRN: [id number] IDT:[id number] Visit Information Date & Time Provider Department Dept. Phone Encounter #  
 8/29/2018  8:00 AM Brendan Mejia MD [facility] [phone] [id number] Follow-up Instructions Return in about 6 months (around 2/28/2019) for physical.  
  
Your Appointments 12/4/2018  2:00 PM  
Follow Up with [facility] ([facility]) Appt Note: Follow up [address] [facility] Mick [phone] [facility] [address]  
[address] [facility] [address] Upcoming Health Maintenance Date Due Influenza Age 5 to Adult 8/1/2018 DTaP/Tdap/Td series (2 - Td) 4/2/2022 Allergies as of 8/29/2018  Review Complete On: 8/29/2018 By: Brendan Mejia MD  
 No Known Allergies Current Immunizations  Reviewed on 8/29/2018 Name Date Influenza Vaccine 10/19/2015, 10/6/2014, 10/22/2013 Influenza Vaccine (Quad) PF 11/6/2017, 10/31/2016 TDAP Vaccine 4/2/2012 Reviewed by Jodee Ascencio LPN on 6/34/5848 at  8:18 AM  
You Were Diagnosed With   
  
 Codes Comments Mixed hyperlipidemia    -  Primary ICD-10-CM: Y00.7 ICD-9-CM: 272.2 NAFLD (nonalcoholic fatty liver disease)     ICD-10-CM: K76.0 ICD-9-CM: 571.8 IGT (impaired glucose tolerance)     ICD-10-CM: R73.02 
ICD-9-CM: 790.22 Encounter for medication monitoring     ICD-10-CM: Z51.81 
ICD-9-CM: V58.83 Family history of prostate cancer in father     ICD-10-CM: Z80.41 
ICD-9-CM: V16.42 Vitals BP Pulse Temp Resp Height(growth percentile) Weight(growth percentile) 120/72 (BP 1 Location: Left arm, BP Patient Position: Sitting) 76 98.2 °F (36.8 °C) (Oral) 16 5' 5\" (1.651 m) 206 lb (93.4 kg) SpO2 BMI Smoking Status 98% 34.28 kg/m2 Never Smoker Vitals History BMI and BSA Data Body Mass Index Body Surface Area  
 34.28 kg/m 2 2.07 m 2 Preferred Pharmacy Pharmacy Name Phone Meg Mckeon, [facility] [phone] Your Updated Medication List  
  
   
This list is accurate as of 8/29/18  8:30 AM.  Always use your most recent med list.  
  
  
  
  
 fenofibrate nanocrystallized 145 mg tablet Commonly known as:  [employer] Take 1 Tab by mouth daily. Indications: hypertriglyceridemia FISH OIL 1,000 mg Cap Generic drug:  omega-3 fatty acids-vitamin e Take 1 Cap by mouth two (2) times a day. We Performed the Following AMB POC HEMOGLOBIN A1C [63283 CPT(R)] CBC W/O DIFF [47850 CPT(R)] LIPID PANEL [33084 CPT(R)] METABOLIC PANEL, COMPREHENSIVE [43262 CPT(R)] PSA, DIAGNOSTIC (PROSTATE SPECIFIC AG) [id number] CPT(R)] Follow-up Instructions Return in about 6 months (around 2/28/2019) for physical.  
  
  
Introducing [facility]! Dear Stephanie Tarango: Thank you for requesting a [employer] account. Our records indicate that you already have an active [employer] account. You can access your account anytime at [email or web address]. Locally/[employer] Did you know that you can access your hospital and ER discharge instructions at any time in [employer]? You can also review all of your test results from your hospital stay or ER visit. Additional Information If you have questions, please visit the Frequently Asked Questions section of the [employer] website at [email or web address]/. Remember, [employer] is NOT to be used for urgent needs. For medical emergencies, dial 911. Now available from your iPhone and Android! Please provide this summary of care documentation to your next provider. Your primary care clinician is listed as Prema Alm.  If you have any questions after today's visit, please call [phone].

## 2018-08-29 NOTE — PROGRESS NOTES
HISTORY OF PRESENT ILLNESS Anna Beckett is a 34 y.o. male. HPI For follow up on cholesterol. Feeling well. Hypercholesterolemia follow up: 
Compliant w/ low fat, low cholesterol diet. Tolerating Tricor. Exercising some but planning to increase this and working on weight loss. No muscle nor abdominal pain, no skin discoloration. Has FH of early CAD. Has had negative CT heart scan with calcium score of zero March 2015. Has NAFLD. Being followed by hepatology. Overall liver enzymes elevations have been stable. Glucose intolerance reveiw: She has IGT. Diabetic ROS - further diabetic ROS: no polyuria or polydipsia, no chest pain, dyspnea or TIA's, no numbness, tingling or pain in extremities, no unusual visual symptoms. Lab review: orders written for new lab studies as appropriate; see orders. Patient Active Problem List  
Diagnosis Code  Mixed hyperlipidemia E78.2  Family history of early CAD Z80.55  
 NAFLD (nonalcoholic fatty liver disease) K76.0  
 IGT (impaired glucose tolerance) R73.02  
 Family history of prostate cancer in father Z80.41 Current Outpatient Prescriptions Medication Sig Dispense Refill  fenofibrate nanocrystallized (TRICOR) 145 mg tablet Take 1 Tab by mouth daily. Indications: hypertriglyceridemia 90 Tab 3  
 omega-3 fatty acids-vitamin e (FISH OIL) 1,000 mg cap Take 1 Cap by mouth two (2) times a day. No Known Allergies Past Medical History:  
Diagnosis Date  FH: CAD (coronary artery disease)  History of liver biopsy 05/2017  Hypercholesterolemia  Ill-defined condition High cholesterol Past Surgical History:  
Procedure Laterality Date  HX VASECTOMY  12/2009  HX VASECTOMY  HX WISDOM TEETH EXTRACTION Family History Problem Relation Age of Onset  Heart Disease Father  Cancer Father   
  throat  Hypertension Father  Heart Attack Father  Hypertension Mother  High Cholesterol Mother  No Known Problems Brother Social History Substance Use Topics  Smoking status: Never Smoker  Smokeless tobacco: Never Used  Alcohol use No  
 
  
Lab Results Component Value Date/Time WBC 6.5 05/05/2017 04:15 PM  
HGB 14.5 05/05/2017 04:15 PM  
HCT 42.3 05/05/2017 04:15 PM  
PLATELET 842 57/30/1516 04:15 PM  
MCV 86 05/05/2017 04:15 PM  
 
Lab Results Component Value Date/Time Cholesterol, total 224 (H) 01/24/2018 09:37 AM  
HDL Cholesterol 40 01/24/2018 09:37 AM  
LDL, calculated 149 (H) 01/24/2018 09:37 AM  
Triglyceride 177 (H) 01/24/2018 09:37 AM  
CHOL/HDL Ratio 5.3 (H) 09/16/2009 03:29 PM  
 
No results found for: TSH, TSH2, TSH3, TSHP, TSHELE, TSHEXT, TT3, T3U, T3UP, FRT3, FT3, FT4, FT4P, T4, T4P, FT4T, TT7, TSHEXT Lab Results Component Value Date/Time Sodium 136 01/24/2018 09:37 AM  
 Potassium 4.6 01/24/2018 09:37 AM  
 Chloride 98 01/24/2018 09:37 AM  
 CO2 20 01/24/2018 09:37 AM  
 Anion gap 8 09/16/2009 03:29 PM  
 Glucose 104 (H) 01/24/2018 09:37 AM  
 BUN 16 01/24/2018 09:37 AM  
 Creatinine 0.94 01/24/2018 09:37 AM  
 BUN/Creatinine ratio 17 01/24/2018 09:37 AM  
 GFR est  01/24/2018 09:37 AM  
 GFR est non- 01/24/2018 09:37 AM  
 Calcium 10.0 01/24/2018 09:37 AM  
 Bilirubin, total <0.2 06/04/2018 02:02 PM  
 ALT (SGPT) 109 (H) 06/04/2018 02:02 PM  
 AST (SGOT) 49 (H) 06/04/2018 02:02 PM  
 Alk. phosphatase 53 06/04/2018 02:02 PM  
 Protein, total 7.5 01/24/2018 09:37 AM  
 Albumin 5.2 06/04/2018 02:02 PM  
 Globulin 3.1 09/16/2009 03:29 PM  
 A-G Ratio 2.3 (H) 01/24/2018 09:37 AM  
  
Lab Results Component Value Date/Time Hemoglobin A1c 5.8 (H) 05/05/2017 04:15 PM  
 Hemoglobin A1c (POC) 5.6 08/29/2018 08:34 AM  
   
 
Review of Systems Constitutional: Negative for malaise/fatigue. HENT: Negative for congestion. Eyes: Negative for blurred vision. Respiratory: Negative for cough and shortness of breath. Cardiovascular: Negative for chest pain, palpitations and leg swelling. Gastrointestinal: Negative for abdominal pain, constipation and heartburn. Genitourinary: Negative for dysuria, frequency and urgency. Musculoskeletal: Negative for back pain and joint pain. Neurological: Negative for dizziness, tingling and headaches. Endo/Heme/Allergies: Negative for environmental allergies. Psychiatric/Behavioral: Negative for depression. The patient does not have insomnia. Physical Exam  
Constitutional: He is oriented to person, place, and time. He appears well-developed and well-nourished. /72 (BP 1 Location: Left arm, BP Patient Position: Sitting)  Pulse 76  Temp 98.2 °F (36.8 °C) (Oral)   Resp 16  Ht 5' 5\" (1.651 m)  Wt 206 lb (93.4 kg)  SpO2 98%  BMI 34.28 kg/m2 HENT:  
Right Ear: Tympanic membrane and ear canal normal.  
Left Ear: Tympanic membrane and ear canal normal.  
Nose: No mucosal edema or rhinorrhea. Mouth/Throat: Oropharynx is clear and moist and mucous membranes are normal.  
Neck: Normal range of motion. Neck supple. No thyromegaly present. Cardiovascular: Normal rate and regular rhythm. No murmur heard. Pulmonary/Chest: Effort normal and breath sounds normal.  
Abdominal: Soft. Bowel sounds are normal. There is no tenderness. Musculoskeletal: Normal range of motion. He exhibits no edema. Lymphadenopathy:  
  He has no cervical adenopathy. Neurological: He is alert and oriented to person, place, and time. Skin: Skin is warm and dry. Psychiatric: He has a normal mood and affect. Nursing note and vitals reviewed. ASSESSMENT and PLAN Diagnoses and all orders for this visit: 
 
1. Mixed hyperlipidemia -     LIPID PANEL 2. NAFLD (nonalcoholic fatty liver disease) Stable 3. IGT (impaired glucose tolerance) -     AMB POC HEMOGLOBIN A1C 
 
4.  Encounter for medication monitoring 
-     CBC W/O DIFF 
-     METABOLIC PANEL, COMPREHENSIVE 
 
 5. Family history of prostate cancer in [facility] Follow-up Disposition: 
Return in about 6 months (around 2/28/2019) for physical. 
reviewed diet, exercise and weight control 
cardiovascular risk and specific lipid/LDL goals reviewed 
reviewed medications and side effects in detail I have discussed diagnosis listed in this note with pt and/or family. I have discussed treatment plans and options and the risk/benefit analysis of those options, including safe use of medications and possible medication side effects. Through the use of shared decision making we have agreed to the above plan. The patient has received an after-visit summary and questions were answered concerning future plans and follow up. Advise pt of any urgent changes then to proceed to the ER.

## 2018-08-30 LAB
ALBUMIN SERPL-MCNC: 5.3 G/DL (ref 3.5–5.5)
ALBUMIN/GLOB SERPL: 2.1 {RATIO} (ref 1.2–2.2)
ALP SERPL-CCNC: 43 IU/L (ref 39–117)
ALT SERPL-CCNC: 88 IU/L (ref 0–44)
AST SERPL-CCNC: 47 IU/L (ref 0–40)
BILIRUB SERPL-MCNC: 0.3 MG/DL (ref 0–1.2)
BUN SERPL-MCNC: 19 MG/DL (ref 6–24)
BUN/CREAT SERPL: 18 (ref 9–20)
CALCIUM SERPL-MCNC: 10 MG/DL (ref 8.7–10.2)
CHLORIDE SERPL-SCNC: 100 MMOL/L (ref 96–106)
CHOLEST SERPL-MCNC: 223 MG/DL (ref 100–199)
CO2 SERPL-SCNC: 23 MMOL/L (ref 20–29)
CREAT SERPL-MCNC: 1.07 MG/DL (ref 0.76–1.27)
ERYTHROCYTE [DISTWIDTH] IN BLOOD BY AUTOMATED COUNT: 14 % (ref 12.3–15.4)
GLOBULIN SER CALC-MCNC: 2.5 G/DL (ref 1.5–4.5)
GLUCOSE SERPL-MCNC: 101 MG/DL (ref 65–99)
HCT VFR BLD AUTO: 36.7 % (ref 37.5–51)
HDLC SERPL-MCNC: 37 MG/DL
HGB BLD-MCNC: 12.7 G/DL (ref 13–17.7)
INTERPRETATION, 910389: NORMAL
LDLC SERPL CALC-MCNC: 146 MG/DL (ref 0–99)
MCH RBC QN AUTO: 29.5 PG (ref 26.6–33)
MCHC RBC AUTO-ENTMCNC: 34.6 G/DL (ref 31.5–35.7)
MCV RBC AUTO: 85 FL (ref 79–97)
PLATELET # BLD AUTO: 265 X10E3/UL (ref 150–379)
POTASSIUM SERPL-SCNC: 4.4 MMOL/L (ref 3.5–5.2)
PROT SERPL-MCNC: 7.8 G/DL (ref 6–8.5)
PSA SERPL-MCNC: 0.7 NG/ML (ref 0–4)
RBC # BLD AUTO: 4.3 X10E6/UL (ref 4.14–5.8)
SODIUM SERPL-SCNC: 137 MMOL/L (ref 134–144)
TRIGL SERPL-MCNC: 200 MG/DL (ref 0–149)
VLDLC SERPL CALC-MCNC: 40 MG/DL (ref 5–40)
WBC # BLD AUTO: 4.6 X10E3/UL (ref 3.4–10.8)

## 2018-10-24 ENCOUNTER — TELEPHONE (OUTPATIENT)
Dept: FAMILY MEDICINE CLINIC | Age: 38
End: 2018-10-24

## 2018-10-24 NOTE — TELEPHONE ENCOUNTER
Patient needs have his [employer] screening sheet filled out for work before the end of this month and can be reached at [phone].

## 2018-10-29 ENCOUNTER — CLINICAL SUPPORT (OUTPATIENT)
Dept: FAMILY MEDICINE CLINIC | Age: 38
End: 2018-10-29

## 2018-10-29 DIAGNOSIS — Z23 ENCOUNTER FOR IMMUNIZATION: Primary | ICD-10-CM

## 2018-10-29 NOTE — PROGRESS NOTES
Verbal order received per Dr. Lisset Chawla- flu vaccine IM- VORB    Pt received flu vaccine IM in left deltoid without any difficulty.

## 2018-10-31 RX ORDER — AZITHROMYCIN 250 MG/1
TABLET, FILM COATED ORAL
Qty: 6 TAB | Refills: 0 | OUTPATIENT
Start: 2018-10-31 | End: 2018-11-05

## 2018-10-31 RX ORDER — AZITHROMYCIN 250 MG/1
TABLET, FILM COATED ORAL
Qty: 6 TAB | Refills: 0 | Status: SHIPPED | OUTPATIENT
Start: 2018-10-31 | End: 2018-11-05

## 2018-12-04 ENCOUNTER — OFFICE VISIT (OUTPATIENT)
Dept: HEMATOLOGY | Age: 38
End: 2018-12-04

## 2018-12-04 VITALS
RESPIRATION RATE: 16 BRPM | SYSTOLIC BLOOD PRESSURE: 131 MMHG | DIASTOLIC BLOOD PRESSURE: 67 MMHG | HEIGHT: 65 IN | HEART RATE: 66 BPM | TEMPERATURE: 97.1 F | OXYGEN SATURATION: 98 % | WEIGHT: 211 LBS | BODY MASS INDEX: 35.16 KG/M2

## 2018-12-04 DIAGNOSIS — K76.0 NAFLD (NONALCOHOLIC FATTY LIVER DISEASE): Primary | ICD-10-CM

## 2018-12-04 NOTE — PROGRESS NOTES
[address] [place], [address], [place], [place]       JUAN Aldana, JOSELIN Serrano, [facility]   Veronda Olives, NP Rachell Crigler, NP        at [address], [address] [address]     [phone]     FAX: [phone]    at [address], [address]     [phone]     FAX: [phone]       Patient Care Team:  Cricket oCles MD as PCP - General      Problem List  Date Reviewed: 10/29/2018          Codes Class Noted    Family history of prostate cancer in father ICD-10-CM: Z80.42  ICD-9-CM: V16.42  8/29/2018        IGT (impaired glucose tolerance) ICD-10-CM: R73.02  ICD-9-CM: 790.22  1/24/2018        NAFLD (nonalcoholic fatty liver disease) ICD-10-CM: K76.0  ICD-9-CM: 571.8  3/3/2017        Family history of early CAD ICD-10-CM: Z82.49  ICD-9-CM: V17.3  12/30/2014        Mixed hyperlipidemia ICD-10-CM: E78.2  ICD-9-CM: 272.2  8/12/2014              Anuj Roach returns to the [address] for management of fatty liver disease. He has enrolled in [facility] 5 year longitudinal study. The active problem list, all pertinent past medical history, medications, liver histology, radiologic findings and laboratory findings related to the liver disorder were reviewed with the patient. The patient is a 34 y.o.  male who was first noted to have abnormalities in liver transaminases in 2013. Serologic evaluation for markers of chronic liver disease were all negative. Ultrasound of the liver was performed in 4/2017. The results of the imaging suggested chronic or fatty liver liver disease. The patient underwent a liver biopsy in 6/2017. This demonstrates NAFLD with 66-75% macro and micovesicular steatosis, no fibrosis.     Fibroscan has also been done and the results correlate with biopsy, showing fibrosis score of 4.8 EkPa and a CAP score of 383, consistent with steatosis. The most recent laboratory studies indicate that the liver transaminases are elevated, tests of hepatic synthetic and metabolic function are normal, and the platelet count is normal. This was most recently drawn through his PCP's office. The patient has no symptoms which could be attributed to the liver disorder. The patient completes all daily activities without any functional limitations. The patient has not experienced fatigue, pain in the right side over the liver. He has had some past success with carbohydrate-restricted diet last Spring, but has not been able to be consistent with his diet. He ha had largely stable weight over the past 18 months. ALLERGIES  No Known Allergies    MEDICATIONS  Current Outpatient Medications   Medication Sig    ferrous sulfate (IRON PO) Take  by mouth daily.  fenofibrate nanocrystallized (TRICOR) 145 mg tablet Take 1 Tab by mouth daily. Indications: hypertriglyceridemia    omega-3 fatty acids-vitamin e (FISH OIL) 1,000 mg cap Take 1 Cap by mouth two (2) times a day. No current facility-administered medications for this visit. SYSTEM REVIEW NOT RELATED TO LIVER DISEASE OR REVIEWED ABOVE:  Constitution systems: Negative for fever, chills. Weight stable in the past 12-18 months. Eyes: Negative for visual changes. ENT: Negative for sore throat, painful swallowing. Respiratory: Negative for cough, hemoptysis, SOB. Cardiology: Negative for chest pain, palpitations. GI:  Negative for constipation or diarrhea. : Negative for urinary frequency, dysuria, hematuria, nocturia. Skin: Negative for rash. Hematology: Negative for easy bruising, blood clots. Musculo-skeletal: Negative for back pain, muscle pain, weakness. Neurologic: Negative for headaches, dizziness, vertigo, memory problems not related to HE.   Psychology: Negative for anxiety, depression. FAMILY HISTORY:  The father has the following chronic diseases: throat cancer. The mother has the following chronic diseases: fatty liver. There is no family history of liver disease. SOCIAL HISTORY:  The patient is . The patient has 1 child. The patient has never used tobacco products. The patient has never consumed significant amounts of alcohol. The patient currently works full time . PHYSICAL EXAMINATION:  /67 (BP 1 Location: Left arm, BP Patient Position: Sitting)   Pulse 66   Temp 97.1 °F (36.2 °C) (Oral)   Resp 16   Ht 5' 5\" (1.651 m)   Wt 211 lb (95.7 kg)   SpO2 98%   BMI 35.11 kg/m²   General: No acute distress. Eyes: Sclera anicteric. ENT: No oral lesions. Thyroid normal.  Nodes: No adenopathy. Skin: No spider angiomata. No jaundice. No palmar erythema. Respiratory: Lungs clear to auscultation. Cardiovascular: Regular heart rate. No murmurs. No JVD. Abdomen: Soft non-tender. Liver size normal to percussion/palpation. Spleen not palpable. No obvious ascites. Extremities: No edema. No muscle wasting. No gross arthritic changes. Neurologic: Alert and oriented. Cranial nerves grossly intact. No asterixis.     LABORATORY STUDIES:  Liver [place] of [address] Units 8/29/2018 6/4/2018 1/24/2018   WBC 3.4 - 10.8 x10E3/uL 4.6     ANC 1.4 - 7.0 x10E3/uL      HGB 13.0 - 17.7 g/dL 12.7 (L)      - 379 x10E3/uL 265     INR 0.8 - 1.2      AST 0 - 40 IU/L 47 (H) 49 (H) 36   ALT 0 - 44 IU/L 88 (H) 109 (H) 73 (H)   Alk Phos 39 - 117 IU/L 43 53 53   Bili, Total 0.0 - 1.2 mg/dL 0.3 <0.2 0.3   Bili, Direct 0.00 - 0.40 mg/dL  0.09    Albumin 3.5 - 5.5 g/dL 5.3 5.2 5.2   BUN 6 - 24 mg/dL 19  16   Creat 0.76 - 1.27 mg/dL 1.07  0.94   Na 134 - 144 mmol/L 137  136   K 3.5 - 5.2 mmol/L 4.4  4.6   Cl 96 - 106 mmol/L 100  98   CO2 20 - 29 mmol/L 23  20   Glucose 65 - 99 mg/dL 101 (H)  104 (H) SEROLOGIES:  Serologies Latest Ref Rng & Units 5/5/2017 3/3/2017 11/2/2016   Hep B Surface Ag Negative   Negative   Ferritin 30 - 400 ng/mL  298    Iron % Saturation 15 - 55 %  27    CHRISTI Ab, Direct Negative  Negative    ASMCA 0 - 19 Units 8     Ceruloplasmin 16.0 - 31.0 mg/dL  34.3 (H)    Alpha-1 antitrypsin level 90 - 200 mg/dL  124      LIVER HISTOLOGY:  6/2017. Slides reviewed by MLS. NAFLD. 66-75% macro and micovesicular steatosis. Mild ballooning. Moderate inflammation. No fibrosis. 1/2018. FibroScan performed at [address]. EkPa was 4.8. Suggested fibrosis level is F0-1. CAP is 383. ENDOSCOPIC PROCEDURES:  Not available or performed    RADIOLOGY:  4/2017. Ultrasound of liver. Echogenic consistent with fatty liver. No liver mass lesions. No dilated bile ducts. No ascites. OTHER TESTING:  Not available or performed    ASSESSMENT AND PLAN:  CALI with no fibrosis. Overall the biopsy can be classified as mild CALI. There is balloning of hepatocytes but this is mild. There is inflammation but this is also mild. NAFLD is very responsive to body weight and will improve if weight loss can be achieved. If the patient loses ~10% of current body weight, which is down to a target weight of 190 pounds, inflammation will likely resolve. If the patient remains at current weight or gains weight CALI will progress, more fibrosis will develop and he will be at risk to develop cirrhosis. Liver transaminases are elevated. Alkaline phosphate is normal.  Liver function is normal.  The platelet count is normal.     We have spent 25-30 minutes discussing weight and exercise management strategies. The patient was counseled regarding diet and exercise to achieve weight loss. The best diet for patients with fatty liver is one very low in carbohydrates and enriched with protein. This has worked for him in the past and he is planning to restart this diet.   We are hoping that he can lose 12-15 # by the time of his return office visit in 6 months with planned repeat Fibroscan. Patient has previously signed consent for participation in the 5 year longitudinal clinical trial [facility]-CAL[facility]. He understands that this natural history study will follow his standard of care. He consented to optional biorepository lab draw and this will be drawn today as part of his office visit. We will continue to monitor the patient at periodic intervals. If weight loss is successful the fat will resolve from the liver and liver enzymes should return to the normal range. We would then repeat an ultrasound to see if this also returns to normal.  If liver enzymes do not return to normal with weight reduction additional evaluation may be necessary over time and we will plan to follow with repeat fibroscan in the future as indicated. The patient was directed to continue all current medications at the current dosages. There are no contraindications for the patient to take any medications that are necessary for treatment of other medical issues including medications for diabetes mellitus and hypercholesterolemia. The patient was counseled regarding alcohol consumption and that this could contribute to fatty liver disease. The need for vaccination against viral hepatitis A and B will be assessed with serologic and instituted as appropriate upon next lab draw. All of the above issues were discussed with the patient. All questions were answered. The patient expressed a clear understanding of the above. [address] [address] in 6 months with repeat Fibroscan at that time.     Rimma Li PA-C  Liver [place] [address], [address] [facility].  [phone]

## 2018-12-04 NOTE — PROGRESS NOTES
Patient identified with 2 ID's, Name and Therman Res is a 34 y.o. male  Chief Complaint   Patient presents with    Fatty Liver     follow up       1. Have you been to the ER, urgent care clinic since your last visit? Hospitalized since your last visit? No     2. Have you seen or consulted any other health care providers outside of the [address] [place] [address] since your last visit? Include any pap smears or colon screening. No     Health Maintenance   Topic Date Due    DTaP/Tdap/Td series (2 - Td) 04/02/2022    Influenza Age 5 to Adult  Completed         Visit Vitals  /67 (BP 1 Location: Left arm, BP Patient Position: Sitting)   Pulse 66   Temp 97.1 °F (36.2 °C) (Oral)   Resp 16   Ht 5' 5\" (1.651 m)   Wt 211 lb (95.7 kg)   SpO2 98%   BMI 35.11 kg/m²       Medication Reconciliation reviewed with patient on this date    Fall Risk Assessment, last 12 mths 12/4/2018   Able to walk? Yes   Fall in past 12 months? No       PHQ over the last two weeks 8/29/2018   Little interest or pleasure in doing things Not at all   Feeling down, depressed, irritable, or hopeless Not at all   Total Score PHQ 2 0       Learning Assessment 6/4/2018   PRIMARY LEARNER Patient   HIGHEST LEVEL OF EDUCATION - PRIMARY LEARNER  -   BARRIERS PRIMARY LEARNER NONE   CO-LEARNER CAREGIVER No   PRIMARY LANGUAGE ENGLISH   LEARNER PREFERENCE PRIMARY LISTENING   ANSWERED BY patient    RELATIONSHIP SELF       Abuse Screening Questionnaire 6/4/2018   Do you ever feel afraid of your partner? N   Are you in a relationship with someone who physically or mentally threatens you? N   Is it safe for you to go home?  Tom Dumont

## 2019-03-08 DIAGNOSIS — E78.2 MIXED HYPERLIPIDEMIA: ICD-10-CM

## 2019-03-08 RX ORDER — FENOFIBRATE 145 MG/1
TABLET, COATED ORAL
Qty: 90 TAB | Refills: 3 | Status: SHIPPED | OUTPATIENT
Start: 2019-03-08 | End: 2020-03-27

## 2019-05-07 ENCOUNTER — OFFICE VISIT (OUTPATIENT)
Dept: FAMILY MEDICINE CLINIC | Age: 39
End: 2019-05-07

## 2019-05-07 VITALS
HEART RATE: 72 BPM | WEIGHT: 213.2 LBS | DIASTOLIC BLOOD PRESSURE: 84 MMHG | RESPIRATION RATE: 16 BRPM | TEMPERATURE: 99 F | BODY MASS INDEX: 35.52 KG/M2 | HEIGHT: 65 IN | SYSTOLIC BLOOD PRESSURE: 131 MMHG | OXYGEN SATURATION: 96 %

## 2019-05-07 DIAGNOSIS — Z00.00 ROUTINE GENERAL MEDICAL EXAMINATION AT A HEALTH CARE FACILITY: Primary | ICD-10-CM

## 2019-05-07 DIAGNOSIS — E78.2 MIXED HYPERLIPIDEMIA: ICD-10-CM

## 2019-05-07 DIAGNOSIS — Z80.42 FH: PROSTATE CANCER: ICD-10-CM

## 2019-05-07 DIAGNOSIS — K76.0 NAFLD (NONALCOHOLIC FATTY LIVER DISEASE): ICD-10-CM

## 2019-05-07 DIAGNOSIS — E66.9 NON MORBID OBESITY: ICD-10-CM

## 2019-05-07 DIAGNOSIS — Z51.81 ENCOUNTER FOR MEDICATION MONITORING: ICD-10-CM

## 2019-05-07 DIAGNOSIS — Z13.1 SCREENING FOR DIABETES MELLITUS: ICD-10-CM

## 2019-05-07 PROBLEM — E66.01 SEVERE OBESITY (HCC): Status: ACTIVE | Noted: 2019-05-07

## 2019-05-07 LAB
BILIRUB UR QL STRIP: NEGATIVE
GLUCOSE UR-MCNC: NEGATIVE MG/DL
HBA1C MFR BLD HPLC: 5.6 %
KETONES P FAST UR STRIP-MCNC: NEGATIVE MG/DL
PH UR STRIP: 5.5 [PH] (ref 4.6–8)
PROT UR QL STRIP: NEGATIVE
SP GR UR STRIP: 1.02 (ref 1–1.03)
UA UROBILINOGEN AMB POC: NORMAL (ref 0.2–1)
URINALYSIS CLARITY POC: CLEAR
URINALYSIS COLOR POC: YELLOW
URINE BLOOD POC: NORMAL
URINE LEUKOCYTES POC: NEGATIVE
URINE NITRITES POC: NEGATIVE

## 2019-05-07 NOTE — PROGRESS NOTES
Subjective:  
Dalton Quintero is a 37 y.o. male presenting for his annual checkup. Hypercholesterolemia follow up: 
Compliant w/ low fat, low cholesterol diet. Tolerating Tricor. Exercising some but planning to increase this and working on weight loss. No muscle nor abdominal pain, no skin discoloration. Has FH of early CAD. Has had negative CT heart scan with calcium score of zero March 2015. Has NAFLD. Being followed by hepatology. Overall liver enzymes elevations have been stable. ROS:  Feeling well. No dyspnea or chest pain on exertion. No abdominal pain, change in bowel habits, black or bloody stools. No urinary tract or prostatic symptoms. No neurological complaints. Patient Active Problem List  
Diagnosis Code  Mixed hyperlipidemia E78.2  Family history of early CAD Z80.55  
 NAFLD (nonalcoholic fatty liver disease) K76.0  
 IGT (impaired glucose tolerance) R73.02  
 Family history of prostate cancer in father Z80.41  Severe obesity (HCC) E66.01  
 
 
Current Outpatient Medications Medication Sig Dispense Refill  fenofibrate nanocrystallized (TRICOR) 145 mg tablet TAKE 1 TABLET DAILY FOR HYPERTRIGLYCERIDEMIA 90 Tab 3  
 ferrous sulfate (IRON PO) Take 1 Tab by mouth daily.  omega-3 fatty acids-vitamin e (FISH OIL) 1,000 mg cap Take 1 Cap by mouth two (2) times a day. No Known Allergies Past Medical History:  
Diagnosis Date  FH: CAD (coronary artery disease)  History of liver biopsy 05/2017  Hypercholesterolemia  Ill-defined condition High cholesterol  CALI (nonalcoholic steatohepatitis) Past Surgical History:  
Procedure Laterality Date  HX VASECTOMY  12/2009  HX VASECTOMY  HX WISDOM TEETH EXTRACTION Family History Problem Relation Age of Onset  Heart Disease Father  Cancer Father   
     throat  Hypertension Father  Heart Attack Father  Hypertension Mother  High Cholesterol Mother  Drug Abuse Brother Social History Tobacco Use  Smoking status: Never Smoker  Smokeless tobacco: Never Used Substance Use Topics  Alcohol use: No  
  Alcohol/week: 0.0 oz Lab Results Component Value Date/Time WBC 4.6 08/29/2018 08:34 AM  
 HGB 12.7 (L) 08/29/2018 08:34 AM  
 HCT 36.7 (L) 08/29/2018 08:34 AM  
 PLATELET 846 14/67/1445 08:34 AM  
 MCV 85 08/29/2018 08:34 AM  
 
 
Lab Results Component Value Date/Time Cholesterol, total 223 (H) 08/29/2018 08:34 AM  
 HDL Cholesterol 37 (L) 08/29/2018 08:34 AM  
 LDL, calculated 146 (H) 08/29/2018 08:34 AM  
 Triglyceride 200 (H) 08/29/2018 08:34 AM  
 CHOL/HDL Ratio 5.3 (H) 09/16/2009 03:29 PM  
 
 
Lab Results Component Value Date/Time Sodium 137 08/29/2018 08:34 AM  
 Potassium 4.4 08/29/2018 08:34 AM  
 Chloride 100 08/29/2018 08:34 AM  
 CO2 23 08/29/2018 08:34 AM  
 Anion gap 8 09/16/2009 03:29 PM  
 Glucose 101 (H) 08/29/2018 08:34 AM  
 BUN 19 08/29/2018 08:34 AM  
 Creatinine 1.07 08/29/2018 08:34 AM  
 BUN/Creatinine ratio 18 08/29/2018 08:34 AM  
 GFR est  08/29/2018 08:34 AM  
 GFR est non-AA 86 08/29/2018 08:34 AM  
 Calcium 10.0 08/29/2018 08:34 AM  
 Bilirubin, total 0.3 08/29/2018 08:34 AM  
 ALT (SGPT) 88 (H) 08/29/2018 08:34 AM  
 AST (SGOT) 47 (H) 08/29/2018 08:34 AM  
 Alk. phosphatase 43 08/29/2018 08:34 AM  
 Protein, total 7.8 08/29/2018 08:34 AM  
 Albumin 5.3 08/29/2018 08:34 AM  
 Globulin 3.1 09/16/2009 03:29 PM  
 A-G Ratio 2.1 08/29/2018 08:34 AM  
  
Lab Results Component Value Date/Time Hemoglobin A1c 5.8 (H) 05/05/2017 04:15 PM  
 Hemoglobin A1c (POC) 5.6 08/29/2018 08:34 AM  
   
 
Objective:  
 
Visit Vitals /84 (BP 1 Location: Left arm, BP Patient Position: Sitting) Pulse 72 Temp 99 °F (37.2 °C) (Oral) Resp 16 Ht 5' 5\" (1.651 m) Wt 213 lb 3.2 oz (96.7 kg) SpO2 96% BMI 35.48 kg/m² The patient appears well, alert, oriented x 3, in no distress. ENT normal.  Neck supple. No adenopathy or thyromegaly. JOSHUA. Lungs are clear, good air entry, no wheezes, rhonchi or rales. S1 and S2 normal, no murmurs, regular rate and rhythm. Abdomen is soft without tenderness, guarding, mass or organomegaly.  exam: no penile lesions or discharge, no testicular masses or tenderness, no hernias. Extremities show no edema, normal peripheral pulses. Neurological is normal without focal findings. Assessment/Plan:  
healthy adult male 
lose weight, increase physical activity, follow low fat diet, follow low salt diet, routine labs ordered, call if any problems. ASSESSMENT and PLAN Diagnoses and all orders for this visit: 1. Routine general medical examination at a health care facility -     AMB POC URINALYSIS DIP STICK AUTO W/ MICRO 2. Mixed hyperlipidemia -     LIPID PANEL 3. NAFLD (nonalcoholic fatty liver disease) Has follow up on next month with liver specialist.   
 
4. Encounter for medication monitoring -     METABOLIC PANEL, COMPREHENSIVE 
-     CBC W/O DIFF 5. Non morbid obesity Counseled on goal for exercise of eventual goal of 30-60 minutes 5-7 times a week as per AHA guidelines. Decrease carbohydrates (white foods, sweet foods, sweet drinks and alcohol), increase green leafy vegetables and protein (lean meats and beans). Avoid fried foods. Increase water intake. Eat 3-5 small meals daily. Increase physical activity. 6. FH: prostate cancer PSA normal last 8/2018.   
 
7. Screening for diabetes mellitus -     AMB POC HEMOGLOBIN A1C Follow-up and Dispositions · Return in about 6 months (around 11/7/2019). reviewed diet, exercise and weight control 
cardiovascular risk and specific lipid/LDL goals reviewed 
reviewed medications and side effects in detail I have discussed diagnosis listed in this note with pt and/or family.  I have discussed treatment plans and options and the risk/benefit analysis of those options, including safe use of medications and possible medication side effects. Through the use of shared decision making we have agreed to the above plan. The patient has received an after-visit summary and questions were answered concerning future plans and follow up. Advise pt of any urgent changes then to proceed to the ER.

## 2019-05-07 NOTE — PROGRESS NOTES
Chief Complaint Patient presents with  Complete Physical  
 
PSA 8/29/2018 1. Have you been to the ER, urgent care clinic since your last visit? Hospitalized since your last visit? no 
 
2. Have you seen or consulted any other health care providers outside of the [address] [place] [address] since your last visit? Include any pap smears or colon screening.  no

## 2019-05-08 LAB
ALBUMIN SERPL-MCNC: 5.3 G/DL (ref 3.5–5.5)
ALBUMIN/GLOB SERPL: 2.2 {RATIO} (ref 1.2–2.2)
ALP SERPL-CCNC: 62 IU/L (ref 39–117)
ALT SERPL-CCNC: 144 IU/L (ref 0–44)
AST SERPL-CCNC: 81 IU/L (ref 0–40)
BILIRUB SERPL-MCNC: 0.3 MG/DL (ref 0–1.2)
BUN SERPL-MCNC: 16 MG/DL (ref 6–24)
BUN/CREAT SERPL: 16 (ref 9–20)
CALCIUM SERPL-MCNC: 10.4 MG/DL (ref 8.7–10.2)
CHLORIDE SERPL-SCNC: 96 MMOL/L (ref 96–106)
CHOLEST SERPL-MCNC: 241 MG/DL (ref 100–199)
CO2 SERPL-SCNC: 21 MMOL/L (ref 20–29)
CREAT SERPL-MCNC: 0.98 MG/DL (ref 0.76–1.27)
ERYTHROCYTE [DISTWIDTH] IN BLOOD BY AUTOMATED COUNT: 13.6 % (ref 12.3–15.4)
GLOBULIN SER CALC-MCNC: 2.4 G/DL (ref 1.5–4.5)
GLUCOSE SERPL-MCNC: 87 MG/DL (ref 65–99)
HCT VFR BLD AUTO: 39.1 % (ref 37.5–51)
HDLC SERPL-MCNC: 30 MG/DL
HGB BLD-MCNC: 13 G/DL (ref 13–17.7)
INTERPRETATION, 910389: NORMAL
LDLC SERPL CALC-MCNC: ABNORMAL MG/DL (ref 0–99)
MCH RBC QN AUTO: 30 PG (ref 26.6–33)
MCHC RBC AUTO-ENTMCNC: 33.2 G/DL (ref 31.5–35.7)
MCV RBC AUTO: 90 FL (ref 79–97)
PDF IMAGE, 910387: NORMAL
PLATELET # BLD AUTO: 268 X10E3/UL (ref 150–379)
POTASSIUM SERPL-SCNC: 4.1 MMOL/L (ref 3.5–5.2)
PROT SERPL-MCNC: 7.7 G/DL (ref 6–8.5)
RBC # BLD AUTO: 4.34 X10E6/UL (ref 4.14–5.8)
SODIUM SERPL-SCNC: 135 MMOL/L (ref 134–144)
TRIGL SERPL-MCNC: 414 MG/DL (ref 0–149)
VLDLC SERPL CALC-MCNC: ABNORMAL MG/DL (ref 5–40)
WBC # BLD AUTO: 6.8 X10E3/UL (ref 3.4–10.8)

## 2019-05-09 ENCOUNTER — TELEPHONE (OUTPATIENT)
Dept: FAMILY MEDICINE CLINIC | Age: 39
End: 2019-05-09

## 2019-06-03 ENCOUNTER — OFFICE VISIT (OUTPATIENT)
Dept: HEMATOLOGY | Age: 39
End: 2019-06-03

## 2019-06-03 VITALS
DIASTOLIC BLOOD PRESSURE: 70 MMHG | HEIGHT: 65 IN | OXYGEN SATURATION: 100 % | BODY MASS INDEX: 34.59 KG/M2 | HEART RATE: 54 BPM | WEIGHT: 207.6 LBS | TEMPERATURE: 98.5 F | SYSTOLIC BLOOD PRESSURE: 126 MMHG

## 2019-06-03 DIAGNOSIS — K76.0 NAFLD (NONALCOHOLIC FATTY LIVER DISEASE): Primary | ICD-10-CM

## 2019-06-03 NOTE — PROGRESS NOTES
[address] [facility], [place], [address], [place], [place]      Hazle Seip, PA-C Lori Parrot, [facility]     April Amita Sanchez, JUAN Dean, JUAN Miranda, JUAN Barber [facility] Count includes the [facility] 136    at [place] [id number], [address] [address]  [address].    [phone]    FAX: [address]    [facility]    [address], [address]    [place], [address]    [phone]    FAX: [phone]     Patient Care Team:  Carlotta Lyons MD as PCP - General      Problem List  Date Reviewed: 5/7/2019          Codes Class Noted    Severe obesity [facility]) ICD-10-CM: E66.01  ICD-9-CM: 278.01  5/7/2019        Family history of prostate cancer in father ICD-10-CM: Z80.42  ICD-9-CM: V16.42  8/29/2018        IGT (impaired glucose tolerance) ICD-10-CM: R73.02  ICD-9-CM: 790.22  1/24/2018        NAFLD (nonalcoholic fatty liver disease) ICD-10-CM: K76.0  ICD-9-CM: 571.8  3/3/2017        Family history of early CAD ICD-10-CM: Z82.49  ICD-9-CM: V17.3  12/30/2014        Mixed hyperlipidemia ICD-10-CM: E78.2  ICD-9-CM: 272.2  8/12/2014              Priscilla Pereira returns to the [address] for management of fatty liver disease. He has enrolled in [facility] 5 year longitudinal study. The active problem list, all pertinent past medical history, medications, liver histology, radiologic findings and laboratory findings related to the liver disorder were reviewed with the patient. The patient is a 37 y.o.  male who was first noted to have abnormalities in liver transaminases in 2013. Serologic evaluation for markers of chronic liver disease were all negative. Ultrasound of the liver was performed in 4/2017.   The results of the imaging suggested chronic or fatty liver liver disease. The patient underwent a liver biopsy in 6/2017. This demonstrates NAFLD with 66-75% macro and micovesicular steatosis, no fibrosis. Fibroscan has also been done and the results correlate with biopsy, showing fibrosis score of 4.8 EkPa and a CAP score of 383, consistent with steatosis. We plan to repeat Fibroscan in the office today to document any progression of fibrosis. The most recent laboratory studies indicate that the liver transaminases are elevated, tests of hepatic synthetic and metabolic function are normal, and the platelet count is normal. This was most recently drawn through his PCP's office. The patient has no symptoms which could be attributed to the liver disorder. The patient completes all daily activities without any functional limitations. The patient has not experienced fatigue, pain in the right side over the liver. He has had some past success with carbohydrate-restricted diet last Spring, but has not been able to be consistent with his diet. He has lost ~6# int he course of the past month and again, is trying to reduce calorie intake and carbohydrate specifically. ALLERGIES  No Known Allergies    MEDICATIONS  Current Outpatient Medications   Medication Sig    fenofibrate nanocrystallized (TRICOR) 145 mg tablet TAKE 1 TABLET DAILY FOR HYPERTRIGLYCERIDEMIA    ferrous sulfate (IRON PO) Take 1 Tab by mouth daily.  omega-3 fatty acids-vitamin e (FISH OIL) 1,000 mg cap Take 1 Cap by mouth two (2) times a day. No current facility-administered medications for this visit. SYSTEM REVIEW NOT RELATED TO LIVER DISEASE OR REVIEWED ABOVE:  Constitution systems: Negative for fever, chills. Weight largely stable in the past 12-18 months. Eyes: Negative for visual changes. ENT: Negative for sore throat, painful swallowing. Respiratory: Negative for cough, hemoptysis, SOB. Cardiology: Negative for chest pain, palpitations.   GI:  Negative for constipation or diarrhea. : Negative for urinary frequency, dysuria, hematuria, nocturia. Skin: Negative for rash. Hematology: Negative for easy bruising, blood clots. Musculo-skeletal: Negative for back pain, muscle pain, weakness. Neurologic: Negative for headaches, dizziness, vertigo, memory problems not related to HE. Psychology: Negative for anxiety, depression. FAMILY HISTORY:  The father has the following chronic diseases: throat cancer. The mother has the following chronic diseases: fatty liver. There is no family history of liver disease. SOCIAL HISTORY:  The patient is . The patient has 1 child. The patient has never used tobacco products. The patient has never consumed significant amounts of alcohol. The patient currently works full time . PHYSICAL EXAMINATION:  /70   Pulse (!) 54   Temp 98.5 °F (36.9 °C) (Tympanic)   Ht 5' 5\" (1.651 m)   Wt 207 lb 9.6 oz (94.2 kg)   SpO2 100%   BMI 34.55 kg/m²   General: No acute distress. Eyes: Sclera anicteric. ENT: No oral lesions. Thyroid normal.  Nodes: No adenopathy. Skin: No spider angiomata. No jaundice. No palmar erythema. Respiratory: Lungs clear to auscultation. Cardiovascular: Regular heart rate. No murmurs. No JVD. Abdomen: Soft non-tender. Liver size normal to percussion/palpation. Spleen not palpable. No obvious ascites. Extremities: No edema. No muscle wasting. No gross arthritic changes. Neurologic: Alert and oriented. Cranial nerves grossly intact. No asterixis.     LABORATORY STUDIES:  Liver [place] of [address]  [address] Units 5/7/2019 8/29/2018 6/4/2018   WBC 3.4 - 10.8 x10E3/uL 6.8 4.6    ANC 1.4 - 7.0 x10E3/uL      HGB 13.0 - 17.7 g/dL 13.0 12.7 (L)     - 379 x10E3/uL 268 265    INR 0.8 - 1.2      AST 0 - 40 IU/L 81 (H) 47 (H) 49 (H)   ALT 0 - 44 IU/L 144 (H) 88 (H) 109 (H)   Alk Phos 39 - 117 IU/L 62 43 53   Bili, Total 0.0 - 1.2 mg/dL 0.3 0.3 <0.2 Bili, Direct 0.00 - 0.40 mg/dL   0.09   Albumin 3.5 - 5.5 g/dL 5.3 5.3 5.2   BUN 6 - 24 mg/dL 16 19    Creat 0.76 - 1.27 mg/dL 0.98 1.07    Na 134 - 144 mmol/L 135 137    K 3.5 - 5.2 mmol/L 4.1 4.4    Cl 96 - 106 mmol/L 96 100    CO2 20 - 29 mmol/L 21 23    Glucose 65 - 99 mg/dL 87 101 (H)      SEROLOGIES:  Serologies Latest Ref Rng & Units 5/5/2017 3/3/2017 11/2/2016   Hep B Surface Ag Negative   Negative   Ferritin 30 - 400 ng/mL  298    Iron % Saturation 15 - 55 %  27    CHRISTI Ab, Direct Negative  Negative    ASMCA 0 - 19 Units 8     Ceruloplasmin 16.0 - 31.0 mg/dL  34.3 (H)    Alpha-1 antitrypsin level 90 - 200 mg/dL  124      LIVER HISTOLOGY:  6/2017. Slides reviewed by MLS. NAFLD. 66-75% macro and micovesicular steatosis. Mild ballooning. Moderate inflammation. No fibrosis. 1/2018. FibroScan performed at [address]. EkPa was 4.8. Suggested fibrosis level is F0-1. CAP is 383, this is consistent with steatosis. 6/2019. FibroScan performed at [address]. EkPa was 6.8. Suggested fibrosis level is F0-1. CAP is 341, this is consistent with steatosis. ENDOSCOPIC PROCEDURES:  Not available or performed    RADIOLOGY:  4/2017. Ultrasound of liver. Echogenic consistent with fatty liver. No liver mass lesions. No dilated bile ducts. No ascites. OTHER TESTING:  Not available or performed    ASSESSMENT AND PLAN:  CALI with no fibrosis. Overall the biopsy can be classified as mild CALI. There is balloning of hepatocytes but this is mild. There is inflammation but this is also mild. NAFLD is very responsive to body weight and will improve if weight loss can be achieved. If the patient loses ~10% of current body weight, which is down to a target weight of 190 pounds, inflammation will likely resolve. If the patient remains at current weight or gains weight CALI will progress, more fibrosis will develop and he will be at risk to develop cirrhosis.     Liver transaminases are elevated. Alkaline phosphate is normal.  Liver function is normal.  The platelet count is normal.     We have spent 25-30 minutes discussing weight and exercise management strategies. The patient was counseled regarding diet and exercise to achieve weight loss. The best diet for patients with fatty liver is one very low in carbohydrates and enriched with protein. This has worked for him in the past and he is planning to restart this diet. We are hoping that he can lose 12-15 # by the time of his return office visit in 6 months. Plan to repeat Fibroscan once a year. Patient has previously signed consent for participation in the 5 year longitudinal clinical trial [facility]ARGET-CALI. He understands that this natural history study will follow his standard of care. We will continue to monitor the patient at periodic intervals. If weight loss is successful the fat will resolve from the liver and liver enzymes should return to the normal range. We would then repeat an ultrasound to see if this also returns to normal.  If liver enzymes do not return to normal with weight reduction additional evaluation may be necessary over time and we will plan to follow with repeat fibroscan in the future as indicated. The patient was directed to continue all current medications at the current dosages. There are no contraindications for the patient to take any medications that are necessary for treatment of other medical issues including medications for diabetes mellitus and hypercholesterolemia. The patient was counseled regarding alcohol consumption and that this could contribute to fatty liver disease. The need for vaccination against viral hepatitis A and B will be assessed with serologic and instituted as appropriate upon next lab draw. All of the above issues were discussed with the patient. All questions were answered.   The patient expressed a clear understanding of the above.    [address] [place] [address] in 6 months.     Sudhakar Larose PA-C  [facility] [place] of [address] [facility], [address] [address]  [address].  [phone]

## 2019-06-03 NOTE — PROGRESS NOTES
1. Have you been to the ER, urgent care clinic since your last visit? Hospitalized since your last visit? No    2. Have you seen or consulted any other health care providers outside of the [address] [place] [address] since your last visit? Include any pap smears or colon screening. No     Chief Complaint   Patient presents with    Follow-up     Fibroscan       Visit Vitals  /70   Pulse (!) 54   Temp 98.5 °F (36.9 °C) (Tympanic)   Ht 5' 5\" (1.651 m)   Wt 207 lb 9.6 oz (94.2 kg)   SpO2 100%   BMI 34.55 kg/m²     3 most recent PHQ Screens 6/3/2019   Little interest or pleasure in doing things Not at all   Feeling down, depressed, irritable, or hopeless Not at all   Total Score PHQ 2 0     Learning Assessment 6/3/2019   PRIMARY LEARNER Patient   HIGHEST LEVEL OF EDUCATION - PRIMARY LEARNER  -   BARRIERS PRIMARY LEARNER NONE   CO-LEARNER CAREGIVER No   PRIMARY LANGUAGE ENGLISH   LEARNER PREFERENCE PRIMARY LISTENING     DEMONSTRATION   ANSWERED BY patient   RELATIONSHIP SELF     Abuse Screening Questionnaire 6/3/2019   Do you ever feel afraid of your partner? N   Are you in a relationship with someone who physically or mentally threatens you? N   Is it safe for you to go home?  He Jacobson

## 2019-11-07 ENCOUNTER — TELEPHONE (OUTPATIENT)
Dept: FAMILY MEDICINE CLINIC | Age: 39
End: 2019-11-07

## 2019-11-07 NOTE — TELEPHONE ENCOUNTER
----- Message from Ольга Harrell sent at 11/7/2019  3:15 PM EST -----  Regarding: Britney ROMEO/Telephone  Contact: [phone]  Appointment not available    Caller's first and last name and relationship to patient (if not the patient):      Best contact number: [phone]      Preferred date and time: Next available      Scheduled appointment date and time: 11/08/2019 2pm (cancelled/patient request rescheduling)    Reason for appointment: Follow Up      Details to clarify the request: Please contact patient to reschedule follow up appt.        Harmeet Bazan

## 2020-01-03 ENCOUNTER — OFFICE VISIT (OUTPATIENT)
Dept: FAMILY MEDICINE CLINIC | Age: 40
End: 2020-01-03

## 2020-01-03 VITALS
BODY MASS INDEX: 34.99 KG/M2 | OXYGEN SATURATION: 98 % | SYSTOLIC BLOOD PRESSURE: 109 MMHG | HEART RATE: 64 BPM | HEIGHT: 65 IN | WEIGHT: 210 LBS | DIASTOLIC BLOOD PRESSURE: 54 MMHG | TEMPERATURE: 97.5 F | RESPIRATION RATE: 18 BRPM

## 2020-01-03 DIAGNOSIS — E78.2 MIXED HYPERLIPIDEMIA: Primary | ICD-10-CM

## 2020-01-03 DIAGNOSIS — S39.012S STRAIN OF LUMBAR REGION, SEQUELA: ICD-10-CM

## 2020-01-03 DIAGNOSIS — Z51.81 ENCOUNTER FOR MEDICATION MONITORING: ICD-10-CM

## 2020-01-03 DIAGNOSIS — R73.02 IGT (IMPAIRED GLUCOSE TOLERANCE): ICD-10-CM

## 2020-01-03 DIAGNOSIS — E66.9 NON MORBID OBESITY: ICD-10-CM

## 2020-01-03 DIAGNOSIS — Z23 ENCOUNTER FOR IMMUNIZATION: ICD-10-CM

## 2020-01-03 DIAGNOSIS — K76.0 NAFLD (NONALCOHOLIC FATTY LIVER DISEASE): ICD-10-CM

## 2020-01-03 LAB — HBA1C MFR BLD HPLC: 5.6 %

## 2020-01-03 RX ORDER — CYCLOBENZAPRINE HCL 10 MG
10 TABLET ORAL
Qty: 30 TAB | Refills: 0 | Status: SHIPPED | OUTPATIENT
Start: 2020-01-03

## 2020-01-03 RX ORDER — DICLOFENAC SODIUM 75 MG/1
75 TABLET, DELAYED RELEASE ORAL
Qty: 30 TAB | Refills: 1 | Status: SHIPPED | OUTPATIENT
Start: 2020-01-03

## 2020-01-03 NOTE — PATIENT INSTRUCTIONS
Back Stretches: Exercises  Introduction  Here are some examples of exercises for stretching your back. Start each exercise slowly. Ease off the exercise if you start to have pain. Your doctor or physical therapist will tell you when you can start these exercises and which ones will work best for you. How to do the exercises  Overhead stretch    1. Stand comfortably with your feet shoulder-width apart. 2. Looking straight ahead, raise both arms over your head and reach toward the ceiling. Do not allow your head to tilt back. 3. Hold for 15 to 30 seconds, then lower your arms to your sides. 4. Repeat 2 to 4 times. Side stretch    1. Stand comfortably with your feet shoulder-width apart. 2. Raise one arm over your head, and then lean to the other side. 3. Slide your hand down your leg as you let the weight of your arm gently stretch your side muscles. Hold for 15 to 30 seconds. 4. Repeat 2 to 4 times on each side. Press-up    1. Lie on your stomach, supporting your body with your forearms. 2. Press your elbows down into the floor to raise your upper back. As you do this, relax your stomach muscles and allow your back to arch without using your back muscles. As your press up, do not let your hips or pelvis come off the floor. 3. Hold for 15 to 30 seconds, then relax. 4. Repeat 2 to 4 times. Relax and rest    1. Lie on your back with a rolled towel under your neck and a pillow under your knees. Extend your arms comfortably to your sides. 2. Relax and breathe normally. 3. Remain in this position for about 10 minutes. 4. If you can, do this 2 or 3 times each day. Follow-up care is a key part of your treatment and safety. Be sure to make and go to all appointments, and call your doctor if you are having problems. It's also a good idea to know your test results and keep a list of the medicines you take. Where can you learn more? Go to http://rodrigo-ellen.info/.   Enter X112 in the search box to learn more about \"Back Stretches: Exercises. \"  Current as of: June 26, 2019  Content Version: 12.2  © 3643-5866 [employer]. Care instructions adapted under license by [employer] (which disclaims liability or warranty for this information). If you have questions about a medical condition or this instruction, always ask your healthcare professional. [address] any warranty or liability for your use of this information. Low Back Pain: Exercises  Introduction  Here are some examples of exercises for you to try. The exercises may be suggested for a condition or for rehabilitation. Start each exercise slowly. Ease off the exercises if you start to have pain. You will be told when to start these exercises and which ones will work best for you. How to do the exercises  Press-up    5. Lie on your stomach, supporting your body with your forearms. 6. Press your elbows down into the floor to raise your upper back. As you do this, relax your stomach muscles and allow your back to arch without using your back muscles. As your press up, do not let your hips or pelvis come off the floor. 7. Hold for 15 to 30 seconds, then relax. 8. Repeat 2 to 4 times. Alternate arm and leg (bird dog) exercise    5. Start on the floor, on your hands and knees. 6. Tighten your belly muscles. 7. Raise one leg off the floor, and hold it straight out behind you. Be careful not to let your hip drop down, because that will twist your trunk. 8. Hold for about 6 seconds, then lower your leg and switch to the other leg. 9. Repeat 8 to 12 times on each leg. 10. Over time, work up to holding for 10 to 30 seconds each time. 11. If you feel stable and secure with your leg raised, try raising the opposite arm straight out in front of you at the same time. Knee-to-chest exercise    5. Lie on your back with your knees bent and your feet flat on the floor.   6. Bring one knee to your chest, keeping the other foot flat on the floor (or keeping the other leg straight, whichever feels better on your lower back). 7. Keep your lower back pressed to the floor. Hold for at least 15 to 30 seconds. 8. Relax, and lower the knee to the starting position. 9. Repeat with the other leg. Repeat 2 to 4 times with each leg. 10. To get more stretch, put your other leg flat on the floor while pulling your knee to your chest.    Curl-ups    5. Lie on the floor on your back with your knees bent at a 90-degree angle. Your feet should be flat on the floor, about 12 inches from your buttocks. 6. Cross your arms over your chest. If this bothers your neck, try putting your hands behind your neck (not your head), with your elbows spread apart. 7. Slowly tighten your belly muscles and raise your shoulder blades off the floor. 8. Keep your head in line with your body, and do not press your chin to your chest.  9. Hold this position for 1 or 2 seconds, then slowly lower yourself back down to the floor. 10. Repeat 8 to 12 times. Pelvic tilt exercise    1. Lie on your back with your knees bent. 2. \"Brace\" your stomach. This means to tighten your muscles by pulling in and imagining your belly button moving toward your spine. You should feel like your back is pressing to the floor and your hips and pelvis are rocking back. 3. Hold for about 6 seconds while you breathe smoothly. 4. Repeat 8 to 12 times. Heel dig bridging    1. Lie on your back with both knees bent and your ankles bent so that only your heels are digging into the floor. Your knees should be bent about 90 degrees. 2. Then push your heels into the floor, squeeze your buttocks, and lift your hips off the floor until your shoulders, hips, and knees are all in a straight line. 3. Hold for about 6 seconds as you continue to breathe normally, and then slowly lower your hips back down to the floor and rest for up to 10 seconds.   4. Do 8 to 12 repetitions. Hamstring stretch in doorway    1. Lie on your back in a doorway, with one leg through the open door. 2. Slide your leg up the wall to straighten your knee. You should feel a gentle stretch down the back of your leg. 3. Hold the stretch for at least 15 to 30 seconds. Do not arch your back, point your toes, or bend either knee. Keep one heel touching the floor and the other heel touching the wall. 4. Repeat with your other leg. 5. Do 2 to 4 times for each leg. Hip flexor stretch    1. Kneel on the floor with one knee bent and one leg behind you. Place your forward knee over your foot. Keep your other knee touching the floor. 2. Slowly push your hips forward until you feel a stretch in the upper thigh of your rear leg. 3. Hold the stretch for at least 15 to 30 seconds. Repeat with your other leg. 4. Do 2 to 4 times on each side. Wall sit    1. Stand with your back 10 to 12 inches away from a wall. 2. Lean into the wall until your back is flat against it. 3. Slowly slide down until your knees are slightly bent, pressing your lower back into the wall. 4. Hold for about 6 seconds, then slide back up the wall. 5. Repeat 8 to 12 times. Follow-up care is a key part of your treatment and safety. Be sure to make and go to all appointments, and call your doctor if you are having problems. It's also a good idea to know your test results and keep a list of the medicines you take. Where can you learn more? Go to http://rodrigo-ellen.info/. Enter N027 in the search box to learn more about \"Low Back Pain: Exercises. \"  Current as of: June 26, 2019  Content Version: 12.2  © 4341-8620 [employer]. Care instructions adapted under license by [employer] (which disclaims liability or warranty for this information).  If you have questions about a medical condition or this instruction, always ask your healthcare professional. [employer]iko G[employer] disclaims any warranty or liability for your use of this information.

## 2020-01-03 NOTE — PROGRESS NOTES
HISTORY OF PRESENT ILLNESS  Otilia Garibay is a 37 y.o. male. HPI   For follow up on cholesterol. C/o mid to low back pain for the past few weeks. Sx comes and goes. No radiation of pain. The pain is positional with bending or lifting, without radiation down the legs. There is no numbness in the legs. No injury noted. No previous hx of back problems noted. Pain is 5-6/10 when at its worse. Has only been taking occassional aleve for the pain which does help. Hypercholesterolemia follow up:  Compliant w/ low fat, low cholesterol diet. Tolerating Tricor. Exercising some but planning to increase this and working on weight loss. No muscle nor abdominal pain, no skin discoloration. Has FH of early CAD. Has had negative CT heart scan with calcium score of zero March 2015. Has NAFLD. Being followed by hepatology. Overall liver enzymes elevations have been stable. Glucose intolerance reveiw:  He has had IGT. Diabetic ROS - further diabetic ROS: no polyuria or polydipsia, no chest pain, dyspnea or TIA's, no numbness, tingling or pain in extremities, no unusual visual symptoms. Lab review: orders written for new lab studies as appropriate; see orders. Patient Active Problem List   Diagnosis Code    Mixed hyperlipidemia E78.2    Family history of early CAD Z80.55    NAFLD (nonalcoholic fatty liver disease) K76.0    IGT (impaired glucose tolerance) R73.02    Family history of prostate cancer in father Z80.41    Severe obesity ([facility] [address] E66.01       Current Outpatient Medications   Medication Sig Dispense Refill    fenofibrate nanocrystallized (TRICOR) 145 mg tablet TAKE 1 TABLET DAILY FOR HYPERTRIGLYCERIDEMIA 90 Tab 3    ferrous sulfate (IRON PO) Take 1 Tab by mouth daily.  omega-3 fatty acids-vitamin e (FISH OIL) 1,000 mg cap Take 1 Cap by mouth two (2) times a day.          No Known Allergies      Past Medical History:   Diagnosis Date    FH: CAD (coronary artery disease)     History of liver biopsy 05/2017    Hypercholesterolemia     Ill-defined condition     High cholesterol    CALI (nonalcoholic steatohepatitis)          Past Surgical History:   Procedure Laterality Date    HX VASECTOMY  12/2009    HX VASECTOMY      HX WISDOM TEETH EXTRACTION           Family History   Problem Relation Age of Onset    Heart Disease Father     Cancer Father         throat    Hypertension Father     Heart Attack Father     Hypertension Mother     High Cholesterol Mother     Drug Abuse Brother        Social History     Tobacco Use    Smoking status: Never Smoker    Smokeless tobacco: Never Used   Substance Use Topics    Alcohol use: No     Alcohol/week: 0.0 standard drinks        Lab Results   Component Value Date/Time    WBC 6.8 05/07/2019 03:24 PM    HGB 13.0 05/07/2019 03:24 PM    HCT 39.1 05/07/2019 03:24 PM    PLATELET 355 28/44/6726 03:24 PM    MCV 90 05/07/2019 03:24 PM     Lab Results   Component Value Date/Time    Cholesterol, total 241 (H) 05/07/2019 03:24 PM    HDL Cholesterol 30 (L) 05/07/2019 03:24 PM    LDL, calculated Comment 05/07/2019 03:24 PM    Triglyceride 414 (H) 05/07/2019 03:24 PM    CHOL/HDL Ratio 5.3 (H) 09/16/2009 03:29 PM     No results found for: TSH, TSH2, TSH3, TSHP, TSHELE, TSHEXT, TT3, T3U, T3UP, FRT3, FT3, FT4, FT4P, T4, T4P, FT4T, TT7, TSHEXT, TSHEXT   Lab Results   Component Value Date/Time    Sodium 135 05/07/2019 03:24 PM    Potassium 4.1 05/07/2019 03:24 PM    Chloride 96 05/07/2019 03:24 PM    CO2 21 05/07/2019 03:24 PM    Anion gap 8 09/16/2009 03:29 PM    Glucose 87 05/07/2019 03:24 PM    BUN 16 05/07/2019 03:24 PM    Creatinine 0.98 05/07/2019 03:24 PM    BUN/Creatinine ratio 16 05/07/2019 03:24 PM    GFR est  05/07/2019 03:24 PM    GFR est non-AA 95 05/07/2019 03:24 PM    Calcium 10.4 (H) 05/07/2019 03:24 PM    Bilirubin, total 0.3 05/07/2019 03:24 PM    ALT (SGPT) 144 (H) 05/07/2019 03:24 PM    AST (SGOT) 81 (H) 05/07/2019 03:24 PM    Alk. phosphatase 62 05/07/2019 03:24 PM    Protein, total 7.7 05/07/2019 03:24 PM    Albumin 5.3 05/07/2019 03:24 PM    Globulin 3.1 09/16/2009 03:29 PM    A-G Ratio 2.2 05/07/2019 03:24 PM      Lab Results   Component Value Date/Time    Hemoglobin A1c 5.8 (H) 05/05/2017 04:15 PM    Hemoglobin A1c (POC) 5.6 05/07/2019 03:24 PM         Review of Systems   Constitutional: Negative for malaise/fatigue. HENT: Negative for congestion. Eyes: Negative for blurred vision. Respiratory: Negative for cough and shortness of breath. Cardiovascular: Negative for chest pain, palpitations and leg swelling. Gastrointestinal: Negative for abdominal pain, constipation and heartburn. Genitourinary: Negative for dysuria, frequency and urgency. Musculoskeletal: Negative for back pain and joint pain. Neurological: Negative for dizziness, tingling and headaches. Endo/Heme/Allergies: Negative for environmental allergies. Psychiatric/Behavioral: Negative for depression. The patient does not have insomnia. Physical Exam   Constitutional: He is oriented to person, place, and time. He appears well-developed and well-nourished. /54 (BP 1 Location: Right arm, BP Patient Position: Sitting)   Pulse 64   Temp 97.5 °F (36.4 °C) (Oral)   Resp 18   Ht 5' 5\" (1.651 m)   Wt 210 lb (95.3 kg)   SpO2 98%   BMI 34.95 kg/m²        HENT:   Right Ear: Tympanic membrane and ear canal normal.   Left Ear: Tympanic membrane and ear canal normal.   Nose: No mucosal edema or rhinorrhea. Mouth/Throat: Oropharynx is clear and moist and mucous membranes are normal.   Neck: Normal range of motion. Neck supple. No thyromegaly present. Cardiovascular: Normal rate and regular rhythm. No murmur heard. Pulmonary/Chest: Effort normal and breath sounds normal.   Abdominal: Soft. Bowel sounds are normal. There is no tenderness. Musculoskeletal: Normal range of motion. General: No edema.    Lymphadenopathy:     He has no cervical adenopathy. Neurological: He is alert and oriented to person, place, and time. Skin: Skin is warm and dry. Psychiatric: He has a normal mood and affect. Nursing note and vitals reviewed. ASSESSMENT and PLAN  Diagnoses and all orders for this visit:    1. Mixed hyperlipidemia  -     LIPID PANEL    2. NAFLD (nonalcoholic fatty liver disease)  Has appt with hepatology on next week. 3. IGT (impaired glucose tolerance)  -     AMB POC HEMOGLOBIN A1C    4. Encounter for medication monitoring  -     METABOLIC PANEL, COMPREHENSIVE    5. Non morbid obesity  I have reviewed/discussed the above normal BMI with the patient. I have recommended the following interventions: dietary management education, guidance, and counseling . 6. Strain of lumbar region, sequela  -     XR SPINE LUMB 2 OR 3 V; Future  -     diclofenac EC (VOLTAREN) 75 mg EC tablet; Take 1 Tab by mouth two (2) times daily as needed for Pain. -     cyclobenzaprine (FLEXERIL) 10 mg tablet; Take 1 Tab by mouth nightly as needed for Muscle Spasm(s) (and muscle tightness in the back). Instructions for exercises given and reviewed with pt. Pt also to use heat to the area 3-4 times a day over the next several days until sx are improved. 7. Encounter for immunization  -     INFLUENZA VIRUS VAC QUAD,SPLIT,PRESV FREE SYRINGE IM  -     WA IMMUNIZ ADMIN,1 SINGLE/COMB VAC/TOXOID      Follow-up and Dispositions    · Return in about 6 months (around 7/3/2020). reviewed diet, exercise and weight control  cardiovascular risk and specific lipid/LDL goals reviewed  reviewed medications and side effects in detail    I have discussed diagnosis listed in this note with pt and/or family. I have discussed treatment plans and options and the risk/benefit analysis of those options, including safe use of medications and possible medication side effects. Through the use of shared decision making we have agreed to the above plan.  The patient has received an after-visit summary and questions were answered concerning future plans and follow up. Advise pt of any urgent changes then to proceed to the ER.

## 2020-01-03 NOTE — PROGRESS NOTES
Chief Complaint   Patient presents with    Cholesterol Problem     6 Month F/U    Back Pain     x 2 months      1. Have you been to the ER, urgent care clinic since your last visit? Hospitalized since your last visit? No    2. Have you seen or consulted any other health care providers outside of the [address] [place] [address] since your last visit? Include any pap smears or colon screening. No     Pt accepts flu shot.     TDAP 4/2/2012    Health Maintenance Due   Topic Date Due    Influenza Age 5 to Adult  08/01/2019

## 2020-01-04 LAB
ALBUMIN SERPL-MCNC: 5.1 G/DL (ref 3.5–5.5)
ALBUMIN/GLOB SERPL: 2.3 {RATIO} (ref 1.2–2.2)
ALP SERPL-CCNC: 43 IU/L (ref 39–117)
ALT SERPL-CCNC: 103 IU/L (ref 0–44)
AST SERPL-CCNC: 55 IU/L (ref 0–40)
BILIRUB SERPL-MCNC: 0.4 MG/DL (ref 0–1.2)
BUN SERPL-MCNC: 17 MG/DL (ref 6–24)
BUN/CREAT SERPL: 16 (ref 9–20)
CALCIUM SERPL-MCNC: 10.2 MG/DL (ref 8.7–10.2)
CHLORIDE SERPL-SCNC: 99 MMOL/L (ref 96–106)
CHOLEST SERPL-MCNC: 226 MG/DL (ref 100–199)
CO2 SERPL-SCNC: 21 MMOL/L (ref 20–29)
CREAT SERPL-MCNC: 1.07 MG/DL (ref 0.76–1.27)
GLOBULIN SER CALC-MCNC: 2.2 G/DL (ref 1.5–4.5)
GLUCOSE SERPL-MCNC: 96 MG/DL (ref 65–99)
HDLC SERPL-MCNC: 33 MG/DL
INTERPRETATION, 910389: NORMAL
LDLC SERPL CALC-MCNC: 139 MG/DL (ref 0–99)
POTASSIUM SERPL-SCNC: 4.5 MMOL/L (ref 3.5–5.2)
PROT SERPL-MCNC: 7.3 G/DL (ref 6–8.5)
SODIUM SERPL-SCNC: 137 MMOL/L (ref 134–144)
TRIGL SERPL-MCNC: 268 MG/DL (ref 0–149)
VLDLC SERPL CALC-MCNC: 54 MG/DL (ref 5–40)

## 2020-01-09 ENCOUNTER — TELEPHONE (OUTPATIENT)
Dept: FAMILY MEDICINE CLINIC | Age: 40
End: 2020-01-09

## 2020-01-22 ENCOUNTER — OFFICE VISIT (OUTPATIENT)
Dept: HEMATOLOGY | Age: 40
End: 2020-01-22

## 2020-01-22 VITALS
HEIGHT: 65 IN | SYSTOLIC BLOOD PRESSURE: 139 MMHG | HEART RATE: 68 BPM | OXYGEN SATURATION: 98 % | WEIGHT: 209.4 LBS | BODY MASS INDEX: 34.89 KG/M2 | TEMPERATURE: 97.8 F | DIASTOLIC BLOOD PRESSURE: 81 MMHG

## 2020-01-22 DIAGNOSIS — K76.0 NAFLD (NONALCOHOLIC FATTY LIVER DISEASE): Primary | ICD-10-CM

## 2020-01-22 NOTE — PROGRESS NOTES
[address] [facility], MD, Cricket Dacosta, Yannick Ramakrishna [place], [place]      Hazle Seip, PA-C Lori Parrot, ACNP-BC     April Amita Sanchez, JUAN Dean, JUAN Miranda, JUAN Bynum [facility] 136    at [facility]    [address] [facility], [facility], [facility] 22.    [phone]    FAX: [address] [place] [address]    at [address], [address]    [phone]    FAX: [phone]     Patient Care Team:  Carlotta Lyons MD as PCP - General  Carlotta Lyons MD as PCP - [facility]      Problem List  Date Reviewed: 1/3/2020          Codes Class Noted    Severe obesity [facility]) ICD-10-CM: E66.01  ICD-9-CM: 278.01  5/7/2019        Family history of prostate cancer in father ICD-10-CM: Z80.42  ICD-9-CM: V16.42  8/29/2018        IGT (impaired glucose tolerance) ICD-10-CM: R73.02  ICD-9-CM: 790.22  1/24/2018        NAFLD (nonalcoholic fatty liver disease) ICD-10-CM: K76.0  ICD-9-CM: 571.8  3/3/2017        Family history of early CAD ICD-10-CM: Z82.49  ICD-9-CM: V17.3  12/30/2014        Mixed hyperlipidemia ICD-10-CM: E78.2  ICD-9-CM: 272.2  8/12/2014              [facility] for management of fatty liver disease. He has enrolled in [facility] 5 year longitudinal study. The active problem list, all pertinent past medical history, medications, liver histology, radiologic findings and laboratory findings related to the liver disorder were reviewed with the patient. The patient is a 37 y.o.  male who was first noted to have abnormalities in liver transaminases in 2013. Serologic evaluation for markers of chronic liver disease were all negative. Ultrasound of the liver was performed in 4/2017.   The results of the imaging suggested chronic or fatty liver liver disease. The patient underwent a liver biopsy in 6/2017. This demonstrates NAFLD with 66-75% macro and micovesicular steatosis, no fibrosis. Fibroscan has also been done and the results correlate with biopsy, showing fibrosis score of 4.8 EkPa and a CAP score of 383, consistent with steatosis. This has been repeated in 6/2019 and showed 6.8. The most recent laboratory studies indicate that the liver transaminases are elevated, tests of hepatic synthetic and metabolic function are normal, and the platelet count is normal. This was most recently drawn through his PCP's office in 1/2020. The patient has no symptoms which could be attributed to the liver disorder. The patient completes all daily activities without any functional limitations. The patient has not experienced fatigue, pain in the right side over the liver. He has had some past success with carbohydrate-restricted diet last Spring, but has not been able to be consistent with his diet. He has not had much further reduction in weight at this time. He was seen 2 weeks ago for management of low back pain which is thought to be musculoskeletal strain. ALLERGIES  No Known Allergies    MEDICATIONS  Current Outpatient Medications   Medication Sig    diclofenac EC (VOLTAREN) 75 mg EC tablet Take 1 Tab by mouth two (2) times daily as needed for Pain.  cyclobenzaprine (FLEXERIL) 10 mg tablet Take 1 Tab by mouth nightly as needed for Muscle Spasm(s) (and muscle tightness in the back).  fenofibrate nanocrystallized (TRICOR) 145 mg tablet TAKE 1 TABLET DAILY FOR HYPERTRIGLYCERIDEMIA    ferrous sulfate (IRON PO) Take 1 Tab by mouth daily.  omega-3 fatty acids-vitamin e (FISH OIL) 1,000 mg cap Take 1 Cap by mouth two (2) times a day. No current facility-administered medications for this visit.         SYSTEM REVIEW NOT RELATED TO LIVER DISEASE OR REVIEWED ABOVE:  Constitution systems: Negative for fever, chills. Weight largely stable in the past 12-18 months. Eyes: Negative for visual changes. ENT: Negative for sore throat, painful swallowing. Respiratory: Negative for cough, hemoptysis, SOB. Cardiology: Negative for chest pain, palpitations. GI:  Negative for constipation or diarrhea. : Negative for urinary frequency, dysuria, hematuria, nocturia. Skin: Negative for rash. Hematology: Negative for easy bruising, blood clots. Musculo-skeletal: Negative for back pain, muscle pain, weakness. Neurologic: Negative for headaches, dizziness, vertigo, memory problems not related to HE. Psychology: Negative for anxiety, depression. FAMILY HISTORY:  The father has the following chronic diseases: throat cancer. The mother has the following chronic diseases: fatty liver. There is no family history of liver disease. SOCIAL HISTORY:  The patient is . The patient has 1 child. The patient has never used tobacco products. The patient has never consumed significant amounts of alcohol. The patient currently works full time . PHYSICAL EXAMINATION:  /81 (BP 1 Location: Left arm, BP Patient Position: Sitting)   Pulse 68   Temp 97.8 °F (36.6 °C)   Ht 5' 5\" (1.651 m)   Wt 209 lb 6.4 oz (95 kg)   SpO2 98%   BMI 34.85 kg/m²   General: No acute distress. Eyes: Sclera anicteric. ENT: No oral lesions. Thyroid normal.  Nodes: No adenopathy. Skin: No spider angiomata. No jaundice. No palmar erythema. Respiratory: Lungs clear to auscultation. Cardiovascular: Regular heart rate. No murmurs. No JVD. Abdomen: Soft non-tender. Liver size normal to percussion/palpation. Spleen not palpable. No obvious ascites. Extremities: No edema. No muscle wasting. No gross arthritic changes. Neurologic: Alert and oriented. Cranial nerves grossly intact. No asterixis.     LABORATORY STUDIES:  [facility] [address] [place] [address] & Units 1/3/2020 5/7/2019 8/29/2018   WBC 3.4 - 10.8 x10E3/uL  6.8 4.6   ANC 1.4 - 7.0 x10E3/uL      HGB 13.0 - 17.7 g/dL  13.0 12.7 (L)    - 379 x10E3/uL  268 265   INR 0.8 - 1.2      AST 0 - 40 IU/L 55 (H) 81 (H) 47 (H)   ALT 0 - 44 IU/L 103 (H) 144 (H) 88 (H)   Alk Phos 39 - 117 IU/L 43 62 43   Bili, Total 0.0 - 1.2 mg/dL 0.4 0.3 0.3   Bili, Direct 0.00 - 0.40 mg/dL      Albumin 3.5 - 5.5 g/dL 5.1 5.3 5.3   BUN 6 - 24 mg/dL 17 16 19   Creat 0.76 - 1.27 mg/dL 1.07 0.98 1.07   Na 134 - 144 mmol/L 137 135 137   K 3.5 - 5.2 mmol/L 4.5 4.1 4.4   Cl 96 - 106 mmol/L 99 96 100   CO2 20 - 29 mmol/L 21 21 23   Glucose 65 - 99 mg/dL 96 87 101 (H)     SEROLOGIES:  Serologies Latest Ref Rng & Units 5/5/2017 3/3/2017 11/2/2016   Hep B Surface Ag Negative   Negative   Ferritin 30 - 400 ng/mL  298    Iron % Saturation 15 - 55 %  27    CHRISTI Ab, Direct Negative  Negative    ASMCA 0 - 19 Units 8     Ceruloplasmin 16.0 - 31.0 mg/dL  34.3 (H)    Alpha-1 antitrypsin level 90 - 200 mg/dL  124      LIVER HISTOLOGY:  6/2017. Slides reviewed by MLS. NAFLD. 66-75% macro and micovesicular steatosis. Mild ballooning. Moderate inflammation. No fibrosis. 1/2018. [facility]. EkPa was 4.8. Suggested fibrosis level is F0-1. CAP is 383, this is consistent with steatosis. 6/2019. [facility]. EkPa was 6.8. Suggested fibrosis level is F0-1. CAP is 341, this is consistent with steatosis. ENDOSCOPIC PROCEDURES:  Not available or performed    RADIOLOGY:  4/2017. Ultrasound of liver. Echogenic consistent with fatty liver. No liver mass lesions. No dilated bile ducts. No ascites. OTHER TESTING:  Not available or performed    ASSESSMENT AND PLAN:  CALI with no fibrosis. Overall the biopsy can be classified as mild CALI. There is balloning of hepatocytes but this is mild. There is inflammation but this is also mild.   NAFLD is very responsive to body weight and will improve if weight loss can be achieved. If the patient loses ~10% of current body weight, which is down to a target weight of 190 pounds, inflammation will likely resolve. If the patient remains at current weight or gains weight CALI will progress, more fibrosis will develop and he will be at risk to develop cirrhosis. Liver transaminases are elevated. Alkaline phosphate is normal.  Liver function is normal.  The platelet count is normal.     We have spent 25-30 minutes discussing weight and exercise management strategies. The patient was counseled regarding diet and exercise to achieve weight loss. The best diet for patients with fatty liver is one very low in carbohydrates and enriched with protein. This has worked for him in the past and he is planning to restart this diet. We are hoping that he can lose 12-15 # by the time of his return office visit in 6 months. Plan to repeat Fibroscan at that time. Patient has previously signed consent for participation in the 5 year longitudinal clinical trial [facility]-CALI. He understands that this natural history study will follow his standard of care. We will continue to monitor the patient at periodic intervals. If weight loss is successful the fat will resolve from the liver and liver enzymes should return to the normal range. We would then repeat an ultrasound to see if this also returns to normal.  If liver enzymes do not return to normal with weight reduction additional evaluation may be necessary over time and we will plan to follow with repeat fibroscan in the future as indicated. The patient was directed to continue all current medications at the current dosages. There are no contraindications for the patient to take any medications that are necessary for treatment of other medical issues including medications for diabetes mellitus and hypercholesterolemia.     The patient was counseled regarding alcohol consumption and that this could contribute to fatty liver disease. The need for vaccination against viral hepatitis A and B will be assessed with serologic and instituted as appropriate upon next lab draw. All of the above issues were discussed with the patient. All questions were answered. The patient expressed a clear understanding of the above. [address] [address] in 6 months with repeat Fibroscan at that time.      Lida Miner PA-C  Liver [place] [facility], [address] [facility] 22.  [phone]  [address] [place] [address]

## 2020-01-22 NOTE — PROGRESS NOTES
Adilene Leo is a 37 y.o. male  Chief Complaint   Patient presents with    Follow-up     6mo f/u     Visit Vitals  /81 (BP 1 Location: Left arm, BP Patient Position: Sitting)   Pulse 68   Temp 97.8 °F (36.6 °C)   Ht 5' 5\" (1.651 m)   Wt 209 lb 6.4 oz (95 kg)   SpO2 98%   BMI 34.85 kg/m²       3 most recent PHQ Screens 1/22/2020   Little interest or pleasure in doing things Not at all   Feeling down, depressed, irritable, or hopeless Not at all   Total Score PHQ 2 0     Learning Assessment 6/3/2019   PRIMARY LEARNER Patient   HIGHEST LEVEL OF EDUCATION - PRIMARY LEARNER  -   BARRIERS PRIMARY LEARNER NONE   CO-LEARNER CAREGIVER No   PRIMARY LANGUAGE ENGLISH   LEARNER PREFERENCE PRIMARY LISTENING     DEMONSTRATION   ANSWERED BY patient   RELATIONSHIP SELF     Abuse Screening Questionnaire 6/3/2019   Do you ever feel afraid of your partner? N   Are you in a relationship with someone who physically or mentally threatens you? N   Is it safe for you to go home? Y         1. Have you been to the ER, urgent care clinic since your last visit? Hospitalized since your last visit? No    2. Have you seen or consulted any other health care providers outside of the [address] [place] [address] since your last visit? Include any pap smears or colon screening.  No

## 2020-03-26 DIAGNOSIS — E78.2 MIXED HYPERLIPIDEMIA: ICD-10-CM

## 2020-03-27 RX ORDER — FENOFIBRATE 145 MG/1
TABLET, COATED ORAL
Qty: 90 TAB | Refills: 3 | Status: SHIPPED | OUTPATIENT
Start: 2020-03-27 | End: 2021-01-08 | Stop reason: SDUPTHER

## 2020-07-01 ENCOUNTER — OFFICE VISIT (OUTPATIENT)
Dept: FAMILY MEDICINE CLINIC | Age: 40
End: 2020-07-01

## 2020-07-01 VITALS
BODY MASS INDEX: 35.62 KG/M2 | HEART RATE: 68 BPM | RESPIRATION RATE: 16 BRPM | WEIGHT: 213.8 LBS | SYSTOLIC BLOOD PRESSURE: 134 MMHG | HEIGHT: 65 IN | OXYGEN SATURATION: 97 % | DIASTOLIC BLOOD PRESSURE: 77 MMHG | TEMPERATURE: 98.4 F

## 2020-07-01 DIAGNOSIS — E78.2 MIXED HYPERLIPIDEMIA: Primary | ICD-10-CM

## 2020-07-01 DIAGNOSIS — K76.0 NAFLD (NONALCOHOLIC FATTY LIVER DISEASE): ICD-10-CM

## 2020-07-01 DIAGNOSIS — Z51.81 ENCOUNTER FOR MEDICATION MONITORING: ICD-10-CM

## 2020-07-01 DIAGNOSIS — Z80.51 FAMILY HISTORY OF RENAL CELL CARCINOMA: ICD-10-CM

## 2020-07-01 DIAGNOSIS — E66.9 NON MORBID OBESITY: ICD-10-CM

## 2020-07-01 DIAGNOSIS — Z80.42 FAMILY HISTORY OF PROSTATE CANCER: ICD-10-CM

## 2020-07-01 DIAGNOSIS — R73.02 IGT (IMPAIRED GLUCOSE TOLERANCE): ICD-10-CM

## 2020-07-01 LAB
BILIRUB UR QL STRIP: NEGATIVE
GLUCOSE UR-MCNC: NEGATIVE MG/DL
KETONES P FAST UR STRIP-MCNC: NEGATIVE MG/DL
PH UR STRIP: 5.5 [PH] (ref 4.6–8)
PROT UR QL STRIP: NEGATIVE
SP GR UR STRIP: 1.02 (ref 1–1.03)
UA UROBILINOGEN AMB POC: NORMAL (ref 0.2–1)
URINALYSIS CLARITY POC: CLEAR
URINALYSIS COLOR POC: YELLOW
URINE BLOOD POC: NEGATIVE
URINE LEUKOCYTES POC: NEGATIVE
URINE NITRITES POC: NEGATIVE

## 2020-07-01 NOTE — PROGRESS NOTES
Chief Complaint   Patient presents with    Cholesterol Problem     follow up       PSA 8/29/2018    1. Have you been to the ER, urgent care clinic since your last visit? Hospitalized since your last visit? no    2. Have you seen or consulted any other health care providers outside of the [address] [place] [address] since your last visit? Include any pap smears or colon screening.  no

## 2020-07-01 NOTE — PROGRESS NOTES
HISTORY OF PRESENT ILLNESS  Arely Reyes is a 41 y.o. male. HPI  Follow up on chronic medical problems. Overall feeling well. Doing the precautionary measures at home to reduce risks of exposure COVID19. Also wearing mask when she is going out. No known sick contacts or known exposure to [address]. Hypercholesterolemia follow up:  Compliant w/ low fat, low cholesterol diet. Tolerating Tricor. Exercising some but planning to increase this and working on weight loss. No muscle nor abdominal pain, no skin discoloration. Has FH of early CAD. Has had negative CT heart scan with calcium score of zero March 2015. Has NAFLD. Being followed by hepatology. Overall liver enzymes elevations have been stable. Glucose intolerance reveiw:  He has had IGT. Last a1c level was 5.6% in January 2020. Diabetic ROS - further diabetic ROS: no polyuria or polydipsia, no chest pain, dyspnea or TIA's, no numbness, tingling or pain in extremities, no unusual visual symptoms. Lab review: orders written for new lab studies as appropriate; see orders. Patient Active Problem List   Diagnosis Code    Mixed hyperlipidemia E78.2    Family history of early CAD Z80.55    NAFLD (nonalcoholic fatty liver disease) K76.0    IGT (impaired glucose tolerance) R73.02    Family history of prostate cancer in father Z80.41    Severe obesity ([address] E66.01       Current Outpatient Medications   Medication Sig Dispense Refill    fenofibrate nanocrystallized (TRICOR) 145 mg tablet TAKE 1 TABLET DAILY FOR    HYPERTRIGLYCERIDEMIA 90 Tab 3    diclofenac EC (VOLTAREN) 75 mg EC tablet Take 1 Tab by mouth two (2) times daily as needed for Pain. 30 Tab 1    cyclobenzaprine (FLEXERIL) 10 mg tablet Take 1 Tab by mouth nightly as needed for Muscle Spasm(s) (and muscle tightness in the back). 30 Tab 0    ferrous sulfate (IRON PO) Take 1 Tab by mouth daily.       omega-3 fatty acids-vitamin e (FISH OIL) 1,000 mg cap Take 1 Cap by mouth two (2) times a day.          No Known Allergies    Past Medical History:   Diagnosis Date    FH: CAD (coronary artery disease)     History of liver biopsy 05/2017    Hypercholesterolemia     Ill-defined condition     High cholesterol    CALI (nonalcoholic steatohepatitis)        Past Surgical History:   Procedure Laterality Date    HX VASECTOMY  12/2009    HX VASECTOMY      HX WISDOM TEETH EXTRACTION         Family History   Problem Relation Age of Onset    Heart Disease Father     Cancer Father         throat    Hypertension Father     Heart Attack Father     Hypertension Mother     High Cholesterol Mother     Drug Abuse Brother        Social History     Tobacco Use    Smoking status: Never Smoker    Smokeless tobacco: Never Used   Substance Use Topics    Alcohol use: No     Alcohol/week: 0.0 standard drinks          Lab Results   Component Value Date/Time    WBC 6.8 05/07/2019 03:24 PM    HGB 13.0 05/07/2019 03:24 PM    HCT 39.1 05/07/2019 03:24 PM    PLATELET 643 51/49/2036 03:24 PM    MCV 90 05/07/2019 03:24 PM     Lab Results   Component Value Date/Time    Cholesterol, total 226 (H) 01/03/2020 09:11 AM    HDL Cholesterol 33 (L) 01/03/2020 09:11 AM    LDL, calculated 139 (H) 01/03/2020 09:11 AM    Triglyceride 268 (H) 01/03/2020 09:11 AM    CHOL/HDL Ratio 5.3 (H) 09/16/2009 03:29 PM     Lab Results   Component Value Date/Time    Prostate Specific Ag 0.7 08/29/2018 08:34 AM     No results found for: TSH, TSH2, TSH3, TSHP, TSHELE, TSHEXT, TT3, T3U, T3UP, FRT3, FT3, FT4, FT4P, T4, T4P, FT4T, TT7, TSHEXT   Lab Results   Component Value Date/Time    Sodium 137 01/03/2020 09:11 AM    Potassium 4.5 01/03/2020 09:11 AM    Chloride 99 01/03/2020 09:11 AM    CO2 21 01/03/2020 09:11 AM    Anion gap 8 09/16/2009 03:29 PM    Glucose 96 01/03/2020 09:11 AM    BUN 17 01/03/2020 09:11 AM    Creatinine 1.07 01/03/2020 09:11 AM    BUN/Creatinine ratio 16 01/03/2020 09:11 AM    GFR est AA 99 01/03/2020 09:11 AM GFR est non-AA 86 01/03/2020 09:11 AM    Calcium 10.2 01/03/2020 09:11 AM    Bilirubin, total 0.4 01/03/2020 09:11 AM    ALT (SGPT) 103 (H) 01/03/2020 09:11 AM    Alk. phosphatase 43 01/03/2020 09:11 AM    Protein, total 7.3 01/03/2020 09:11 AM    Albumin 5.1 01/03/2020 09:11 AM    Globulin 3.1 09/16/2009 03:29 PM    A-G Ratio 2.3 (H) 01/03/2020 09:11 AM      Lab Results   Component Value Date/Time    Hemoglobin A1c 5.8 (H) 05/05/2017 04:15 PM    Hemoglobin A1c (POC) 5.6 01/03/2020 09:10 AM         Review of Systems   Constitutional: Negative for malaise/fatigue. HENT: Negative for congestion. Eyes: Negative for blurred vision. Respiratory: Negative for cough and shortness of breath. Cardiovascular: Negative for chest pain, palpitations and leg swelling. Gastrointestinal: Negative for abdominal pain, constipation and heartburn. Genitourinary: Negative for dysuria, frequency and urgency. Musculoskeletal: Negative for back pain and joint pain. Neurological: Negative for dizziness, tingling and headaches. Endo/Heme/Allergies: Negative for environmental allergies. Psychiatric/Behavioral: Negative for depression. The patient does not have insomnia. Physical Exam  Vitals signs and nursing note reviewed. Constitutional:       Appearance: Normal appearance. He is well-developed. Comments: /77 (BP 1 Location: Left arm, BP Patient Position: Sitting)   Pulse 68   Temp 98.4 °F (36.9 °C) (Oral)   Resp 16   Ht 5' 5\" (1.651 m)   Wt 213 lb 12.8 oz (97 kg)   SpO2 97%   BMI 35.58 kg/m²    HENT:      Right Ear: Tympanic membrane and ear canal normal.      Left Ear: Tympanic membrane and ear canal normal.      Nose: No mucosal edema or rhinorrhea. Neck:      Musculoskeletal: Normal range of motion and neck supple. Thyroid: No thyromegaly. Cardiovascular:      Rate and Rhythm: Normal rate and regular rhythm. Heart sounds: Normal heart sounds.    Pulmonary:      Effort: Pulmonary effort is normal.      Breath sounds: Normal breath sounds. No wheezing, rhonchi or rales. Abdominal:      General: Bowel sounds are normal. There is no distension. Palpations: Abdomen is soft. There is no mass. Tenderness: There is no abdominal tenderness. Musculoskeletal: Normal range of motion. General: No swelling. Lymphadenopathy:      Cervical: No cervical adenopathy. Skin:     General: Skin is warm and dry. Neurological:      General: No focal deficit present. Mental Status: He is alert and oriented to person, place, and time. Psychiatric:         Mood and Affect: Mood normal.         Thought Content: Thought content normal.         ASSESSMENT and PLAN  Diagnoses and all orders for this visit:    1. Mixed hyperlipidemia  -     LIPID PANEL    2. NAFLD (nonalcoholic fatty liver disease)  Continue to monitor. Work on diet and exercise. 3. IGT (impaired glucose tolerance)  Continue to monitor. Work on diet and exercise. 4. Encounter for medication monitoring  -     METABOLIC PANEL, COMPREHENSIVE    5. Non morbid obesity  I have reviewed/discussed the above normal BMI with the patient. I have recommended the following interventions: dietary management education, guidance, and counseling . 6. Family history of prostate cancer  -     PSA, DIAGNOSTIC (PROSTATE SPECIFIC AG)    7. Family history of renal cell carcinoma  -     AMB POC URINALYSIS DIP STICK AUTO W/ MICRO      Follow-up and Dispositions    · Return in about 6 months (around 1/1/2021). reviewed diet, exercise and weight control  cardiovascular risk and specific lipid/LDL goals reviewed  reviewed medications and side effects in detail  specific diabetic recommendations: low cholesterol diet, weight control and daily exercise discussed and glycohemoglobin and other lab monitoring discussed     I have discussed diagnosis listed in this note with pt and/or family.  I have discussed treatment plans and options and the risk/benefit analysis of those options, including safe use of medications and possible medication side effects. Through the use of shared decision making we have agreed to the above plan. The patient has received an after-visit summary and questions were answered concerning future plans and follow up. Advise pt of any urgent changes then to proceed to the ER.

## 2020-07-02 LAB
ALBUMIN SERPL-MCNC: 5.4 G/DL (ref 4–5)
ALBUMIN/GLOB SERPL: 2.6 {RATIO} (ref 1.2–2.2)
ALP SERPL-CCNC: 52 IU/L (ref 39–117)
ALT SERPL-CCNC: 96 IU/L (ref 0–44)
AST SERPL-CCNC: 47 IU/L (ref 0–40)
BILIRUB SERPL-MCNC: 0.3 MG/DL (ref 0–1.2)
BUN SERPL-MCNC: 15 MG/DL (ref 6–24)
BUN/CREAT SERPL: 14 (ref 9–20)
CALCIUM SERPL-MCNC: 10.7 MG/DL (ref 8.7–10.2)
CHLORIDE SERPL-SCNC: 100 MMOL/L (ref 96–106)
CHOLEST SERPL-MCNC: 230 MG/DL (ref 100–199)
CO2 SERPL-SCNC: 22 MMOL/L (ref 20–29)
CREAT SERPL-MCNC: 1.1 MG/DL (ref 0.76–1.27)
GLOBULIN SER CALC-MCNC: 2.1 G/DL (ref 1.5–4.5)
GLUCOSE SERPL-MCNC: 108 MG/DL (ref 65–99)
HDLC SERPL-MCNC: 33 MG/DL
INTERPRETATION, 910389: NORMAL
LDLC SERPL CALC-MCNC: 133 MG/DL (ref 0–99)
POTASSIUM SERPL-SCNC: 3.8 MMOL/L (ref 3.5–5.2)
PROT SERPL-MCNC: 7.5 G/DL (ref 6–8.5)
PSA SERPL-MCNC: 0.8 NG/ML (ref 0–4)
SODIUM SERPL-SCNC: 138 MMOL/L (ref 134–144)
TRIGL SERPL-MCNC: 318 MG/DL (ref 0–149)
VLDLC SERPL CALC-MCNC: 64 MG/DL (ref 5–40)

## 2020-12-16 ENCOUNTER — IMMUNIZATION CLINIC (OUTPATIENT)
Dept: FAMILY MEDICINE CLINIC | Age: 40
End: 2020-12-16
Payer: COMMERCIAL

## 2020-12-16 DIAGNOSIS — Z23 ENCOUNTER FOR IMMUNIZATION: ICD-10-CM

## 2020-12-16 PROCEDURE — 90471 IMMUNIZATION ADMIN: CPT | Performed by: FAMILY MEDICINE

## 2020-12-16 PROCEDURE — 90686 IIV4 VACC NO PRSV 0.5 ML IM: CPT | Performed by: FAMILY MEDICINE

## 2021-01-18 ENCOUNTER — OFFICE VISIT (OUTPATIENT)
Dept: FAMILY MEDICINE CLINIC | Age: 41
End: 2021-01-18
Payer: COMMERCIAL

## 2021-01-18 VITALS
DIASTOLIC BLOOD PRESSURE: 82 MMHG | TEMPERATURE: 96.9 F | OXYGEN SATURATION: 99 % | HEART RATE: 60 BPM | SYSTOLIC BLOOD PRESSURE: 130 MMHG | HEIGHT: 65 IN | RESPIRATION RATE: 16 BRPM | WEIGHT: 212.2 LBS | BODY MASS INDEX: 35.35 KG/M2

## 2021-01-18 DIAGNOSIS — R73.02 IGT (IMPAIRED GLUCOSE TOLERANCE): ICD-10-CM

## 2021-01-18 DIAGNOSIS — E78.2 MIXED HYPERLIPIDEMIA: Primary | ICD-10-CM

## 2021-01-18 DIAGNOSIS — K76.0 NAFLD (NONALCOHOLIC FATTY LIVER DISEASE): ICD-10-CM

## 2021-01-18 DIAGNOSIS — E66.9 NON MORBID OBESITY: ICD-10-CM

## 2021-01-18 DIAGNOSIS — Z51.81 ENCOUNTER FOR MEDICATION MONITORING: ICD-10-CM

## 2021-01-18 PROCEDURE — 99213 OFFICE O/P EST LOW 20 MIN: CPT | Performed by: FAMILY MEDICINE

## 2021-01-18 NOTE — PROGRESS NOTES
HISTORY OF PRESENT ILLNESS  Beulah Flanagan is a 41 y.o. male. HPI  Follow up on chronic medical problems. Overall feeling well. Doing the precautionary measures at home to reduce risks of exposure COVID19. Also wearing mask when she is going out. No known sick contacts or known exposure to [address]. Hypercholesterolemia follow up:  Compliant w/ low fat, low cholesterol diet. Tolerating Tricor. Exercising some but planning to increase this and still working on weight loss. No muscle nor abdominal pain, no skin discoloration. Has FH of early CAD. Has had negative CT heart scan with calcium score of zero March 2015. Has NAFLD. Being followed by hepatology. Overall liver enzymes elevations have been stable. Glucose intolerance reveiw:  He has had IGT. Last a1c level was 5.6% in January 2020. Diabetic ROS - further diabetic ROS: no polyuria or polydipsia, no chest pain, dyspnea or TIA's, no numbness, tingling or pain in extremities, no unusual visual symptoms. Lab review: orders written for new lab studies as appropriate; see orders. Patient Active Problem List   Diagnosis Code    Mixed hyperlipidemia E78.2    Family history of early CAD Z80.55    NAFLD (nonalcoholic fatty liver disease) K76.0    IGT (impaired glucose tolerance) R73.02    Family history of prostate cancer in father Z80.41    Severe obesity ([address] E66.01       Current Outpatient Medications   Medication Sig Dispense Refill    fenofibrate nanocrystallized (TRICOR) 145 mg tablet TAKE 1 TABLET DAILY FOR    HYPERTRIGLYCERIDEMIA 30 Tab 11    diclofenac EC (VOLTAREN) 75 mg EC tablet Take 1 Tab by mouth two (2) times daily as needed for Pain. 30 Tab 1    cyclobenzaprine (FLEXERIL) 10 mg tablet Take 1 Tab by mouth nightly as needed for Muscle Spasm(s) (and muscle tightness in the back). 30 Tab 0    ferrous sulfate (IRON PO) Take 1 Tab by mouth daily.       omega-3 fatty acids-vitamin e (FISH OIL) 1,000 mg cap Take 1 Cap by mouth two (2) times a day.          No Known Allergies      Past Medical History:   Diagnosis Date    FH: CAD (coronary artery disease)     History of liver biopsy 05/2017    Hypercholesterolemia     Ill-defined condition     High cholesterol    CALI (nonalcoholic steatohepatitis)          Past Surgical History:   Procedure Laterality Date    HX VASECTOMY  12/2009    HX VASECTOMY      HX WISDOM TEETH EXTRACTION           Family History   Problem Relation Age of Onset    Heart Disease Father     Cancer Father         throat    Hypertension Father     Heart Attack Father     Hypertension Mother     High Cholesterol Mother     Drug Abuse Brother        Social History     Tobacco Use    Smoking status: Never Smoker    Smokeless tobacco: Never Used   Substance Use Topics    Alcohol use: No     Alcohol/week: 0.0 standard drinks          Lab Results   Component Value Date/Time    WBC 6.8 05/07/2019 03:24 PM    HGB 13.0 05/07/2019 03:24 PM    HCT 39.1 05/07/2019 03:24 PM    PLATELET 944 36/46/8821 03:24 PM    MCV 90 05/07/2019 03:24 PM     Lab Results   Component Value Date/Time    Cholesterol, total 230 (H) 07/01/2020 03:45 PM    HDL Cholesterol 33 (L) 07/01/2020 03:45 PM    LDL, calculated 133 (H) 07/01/2020 03:45 PM    Triglyceride 318 (H) 07/01/2020 03:45 PM    CHOL/HDL Ratio 5.3 (H) 09/16/2009 03:29 PM     Lab Results   Component Value Date/Time    Prostate Specific Ag 0.8 07/01/2020 03:45 PM    Prostate Specific Ag 0.7 08/29/2018 08:34 AM     No results found for: TSH, TSH2, TSH3, TSHP, TSHELE, TSHEXT, TT3, T3U, T3UP, FRT3, FT3, FT4, FT4P, T4, T4P, FT4T, TT7, TSHEXT, TSHEXT   Lab Results   Component Value Date/Time    Sodium 138 07/01/2020 03:45 PM    Potassium 3.8 07/01/2020 03:45 PM    Chloride 100 07/01/2020 03:45 PM    CO2 22 07/01/2020 03:45 PM    Anion gap 8 09/16/2009 03:29 PM    Glucose 108 (H) 07/01/2020 03:45 PM    BUN 15 07/01/2020 03:45 PM    Creatinine 1.10 07/01/2020 03:45 PM BUN/Creatinine ratio 14 07/01/2020 03:45 PM    GFR est AA 95 07/01/2020 03:45 PM    GFR est non-AA 82 07/01/2020 03:45 PM    Calcium 10.7 (H) 07/01/2020 03:45 PM    Bilirubin, total 0.3 07/01/2020 03:45 PM    ALT (SGPT) 96 (H) 07/01/2020 03:45 PM    Alk. phosphatase 52 07/01/2020 03:45 PM    Protein, total 7.5 07/01/2020 03:45 PM    Albumin 5.4 (H) 07/01/2020 03:45 PM    Globulin 3.1 09/16/2009 03:29 PM    A-G Ratio 2.6 (H) 07/01/2020 03:45 PM      Lab Results   Component Value Date/Time    Hemoglobin A1c 5.8 (H) 05/05/2017 04:15 PM    Hemoglobin A1c (POC) 5.6 01/03/2020 09:10 AM         Review of Systems   Constitutional: Negative for malaise/fatigue. HENT: Negative for congestion. Eyes: Negative for blurred vision. Respiratory: Negative for cough and shortness of breath. Cardiovascular: Negative for chest pain, palpitations and leg swelling. Gastrointestinal: Negative for abdominal pain, constipation and heartburn. Genitourinary: Negative for dysuria, frequency and urgency. Musculoskeletal: Negative for back pain and joint pain. Neurological: Negative for dizziness, tingling and headaches. Endo/Heme/Allergies: Negative for environmental allergies. Psychiatric/Behavioral: Negative for depression. The patient does not have insomnia. Physical Exam  Vitals signs and nursing note reviewed. Constitutional:       Appearance: Normal appearance. He is well-developed. Comments: /82 (BP 1 Location: Left arm, BP Patient Position: Sitting)   Pulse 60   Temp 96.9 °F (36.1 °C) (Temporal)   Resp 16   Ht 5' 5\" (1.651 m)   Wt 212 lb 3.2 oz (96.3 kg)   SpO2 99%   BMI 35.31 kg/m²      HENT:      Right Ear: Tympanic membrane and ear canal normal.      Left Ear: Tympanic membrane and ear canal normal.      Nose: No mucosal edema or rhinorrhea. Neck:      Musculoskeletal: Normal range of motion and neck supple. Thyroid: No thyromegaly.    Cardiovascular:      Rate and Rhythm: Normal rate and regular rhythm. Heart sounds: Normal heart sounds. Pulmonary:      Effort: Pulmonary effort is normal.      Breath sounds: Normal breath sounds. No wheezing, rhonchi or rales. Abdominal:      General: Bowel sounds are normal. There is no distension. Palpations: Abdomen is soft. There is no mass. Tenderness: There is no abdominal tenderness. Musculoskeletal: Normal range of motion. General: No swelling. Lymphadenopathy:      Cervical: No cervical adenopathy. Skin:     General: Skin is warm and dry. Neurological:      General: No focal deficit present. Mental Status: He is alert and oriented to person, place, and time. Psychiatric:         Mood and Affect: Mood normal.         Thought Content: Thought content normal.         ASSESSMENT and PLAN  Diagnoses and all orders for this visit:    1. Mixed hyperlipidemia  -     LIPID PANEL; Future    2. NAFLD (nonalcoholic fatty liver disease)  Stable. Has not had recent follow up with hepatology d/t the pandemic but overall feeling well. LFTs have been WNL. 3. IGT (impaired glucose tolerance)  -     HEMOGLOBIN A1C WITH EAG; Future    4. Encounter for medication monitoring  -     CBC W/O DIFF; Future  -     METABOLIC PANEL, COMPREHENSIVE; Future    5. Non morbid obesity  I have reviewed/discussed the above normal BMI with the patient. I have recommended the following interventions: dietary management education, guidance, and counseling . Follow-up and Dispositions    · Return in about 6 months (around 7/18/2021). reviewed diet, exercise and weight control  cardiovascular risk and specific lipid/LDL goals reviewed  reviewed medications and side effects in detail  specific diabetic recommendations: low cholesterol diet, weight control and daily exercise discussed and glycohemoglobin and other lab monitoring discussed     I have discussed diagnosis listed in this note with pt and/or family.  I have discussed treatment plans and options and the risk/benefit analysis of those options, including safe use of medications and possible medication side effects. Through the use of shared decision making we have agreed to the above plan. The patient has received an after-visit summary and questions were answered concerning future plans and follow up. Advise pt of any urgent changes then to proceed to the ER.

## 2021-01-18 NOTE — PROGRESS NOTES
PSA 7/1/2020.    1. Have you been to the ER, urgent care clinic since your last visit? Hospitalized since your last visit? no    2. Have you seen or consulted any other health care providers outside of the [address] [place] [address] since your last visit? Include any pap smears or colon screening.  no

## 2021-05-04 DIAGNOSIS — E78.2 MIXED HYPERLIPIDEMIA: ICD-10-CM

## 2021-05-04 RX ORDER — FENOFIBRATE 145 MG/1
TABLET, COATED ORAL
Qty: 90 TAB | Refills: 3 | Status: SHIPPED | OUTPATIENT
Start: 2021-05-04 | End: 2022-05-09 | Stop reason: SDUPTHER

## 2022-01-12 ENCOUNTER — OFFICE VISIT (OUTPATIENT)
Dept: FAMILY MEDICINE CLINIC | Age: 42
End: 2022-01-12
Payer: COMMERCIAL

## 2022-01-12 VITALS
RESPIRATION RATE: 16 BRPM | TEMPERATURE: 96.9 F | SYSTOLIC BLOOD PRESSURE: 133 MMHG | DIASTOLIC BLOOD PRESSURE: 86 MMHG | WEIGHT: 220.2 LBS | HEIGHT: 65 IN | BODY MASS INDEX: 36.69 KG/M2 | OXYGEN SATURATION: 97 % | HEART RATE: 66 BPM

## 2022-01-12 DIAGNOSIS — Z51.81 ENCOUNTER FOR MEDICATION MONITORING: ICD-10-CM

## 2022-01-12 DIAGNOSIS — E78.2 MIXED HYPERLIPIDEMIA: Primary | ICD-10-CM

## 2022-01-12 DIAGNOSIS — R73.02 IGT (IMPAIRED GLUCOSE TOLERANCE): ICD-10-CM

## 2022-01-12 DIAGNOSIS — K76.0 NAFLD (NONALCOHOLIC FATTY LIVER DISEASE): ICD-10-CM

## 2022-01-12 DIAGNOSIS — Z80.42 FAMILY HISTORY OF PROSTATE CANCER IN FATHER: ICD-10-CM

## 2022-01-12 DIAGNOSIS — E66.9 NON MORBID OBESITY: ICD-10-CM

## 2022-01-12 LAB
GLUCOSE POC: 101 MG/DL
HBA1C MFR BLD HPLC: 5.7 %

## 2022-01-12 PROCEDURE — 99213 OFFICE O/P EST LOW 20 MIN: CPT | Performed by: FAMILY MEDICINE

## 2022-01-12 PROCEDURE — 82947 ASSAY GLUCOSE BLOOD QUANT: CPT | Performed by: FAMILY MEDICINE

## 2022-01-12 PROCEDURE — 83036 HEMOGLOBIN GLYCOSYLATED A1C: CPT | Performed by: FAMILY MEDICINE

## 2022-01-12 NOTE — PROGRESS NOTES
PSA 7/1/2020.     1. Have you been to the ER, urgent care clinic since your last visit? Hospitalized since your last visit? no    2. Have you seen or consulted any other health care providers outside of the [address] [place] [address] since your last visit? Include any pap smears or colon screening.  no

## 2022-01-12 NOTE — PROGRESS NOTES
HISTORY OF PRESENT ILLNESS  Sabino Leonardo is a 35 y.o. male. HPI   Follow up on chronic medical problems. Last seen in July 2020. Overall has been doing well. Overall feeling well. Doing the precautionary measures at home to reduce risks of exposure COVID19. Also wearing mask when she is going out. No known sick contacts or known exposure to [address]. Hypercholesterolemia follow up:  Compliant w/ low fat, low cholesterol diet. Tolerating Tricor. Exercising some but planning to increase this and still working on weight loss. No muscle nor abdominal pain, no skin discoloration. Has FH of early CAD. Has had negative CT heart scan with calcium score of zero March 2015. Has NAFLD. Being followed by hepatology but has not had recent follow up. Overall liver enzymes elevations have been stable. Glucose intolerance reveiw:  He has had IGT. Last a1c level was 5.6% in January 2020. Diabetic ROS - further diabetic ROS: no polyuria or polydipsia, no chest pain, dyspnea or TIA's, no numbness, tingling or pain in extremities, no unusual visual symptoms. Lab review: orders written for new lab studies as appropriate; see orders. Patient Active Problem List   Diagnosis Code    Mixed hyperlipidemia E78.2    Family history of early CAD Z80.55    NAFLD (nonalcoholic fatty liver disease) K76.0    IGT (impaired glucose tolerance) R73.02    Family history of prostate cancer in father Z80.41    Severe obesity ([facility] [address] E66.01       Current Outpatient Medications   Medication Sig Dispense Refill    fenofibrate nanocrystallized (TRICOR) 145 mg tablet TAKE 1 TABLET DAILY FOR    HYPERTRIGLYCERIDEMIA 90 Tab 3    diclofenac EC (VOLTAREN) 75 mg EC tablet Take 1 Tab by mouth two (2) times daily as needed for Pain. 30 Tab 1    cyclobenzaprine (FLEXERIL) 10 mg tablet Take 1 Tab by mouth nightly as needed for Muscle Spasm(s) (and muscle tightness in the back).  30 Tab 0    ferrous sulfate (IRON PO) Take 1 Tab by mouth daily.  omega-3 fatty acids-vitamin e (FISH OIL) 1,000 mg cap Take 1 Cap by mouth two (2) times a day.          No Known Allergies    Past Medical History:   Diagnosis Date    FH: CAD (coronary artery disease)     History of liver biopsy 05/2017    Hypercholesterolemia     Ill-defined condition     High cholesterol    CALI (nonalcoholic steatohepatitis)        Past Surgical History:   Procedure Laterality Date    HX VASECTOMY  12/2009    HX VASECTOMY      HX WISDOM TEETH EXTRACTION         Family History   Problem Relation Age of Onset    Heart Disease Father     Cancer Father         throat    Hypertension Father     Heart Attack Father     Hypertension Mother     High Cholesterol Mother     Drug Abuse Brother        Social History     Tobacco Use    Smoking status: Never Smoker    Smokeless tobacco: Never Used   Substance Use Topics    Alcohol use: No     Alcohol/week: 0.0 standard drinks        Lab Results   Component Value Date/Time    WBC 6.8 05/07/2019 03:24 PM    HGB 13.0 05/07/2019 03:24 PM    HCT 39.1 05/07/2019 03:24 PM    PLATELET 241 98/34/4682 03:24 PM    MCV 90 05/07/2019 03:24 PM     Lab Results   Component Value Date/Time    Cholesterol, total 230 (H) 07/01/2020 03:45 PM    HDL Cholesterol 33 (L) 07/01/2020 03:45 PM    LDL, calculated 133 (H) 07/01/2020 03:45 PM    Triglyceride 318 (H) 07/01/2020 03:45 PM    CHOL/HDL Ratio 5.3 (H) 09/16/2009 03:29 PM       Lab Results   Component Value Date/Time    Sodium 138 07/01/2020 03:45 PM    Potassium 3.8 07/01/2020 03:45 PM    Chloride 100 07/01/2020 03:45 PM    CO2 22 07/01/2020 03:45 PM    Anion gap 8 09/16/2009 03:29 PM    Glucose 108 (H) 07/01/2020 03:45 PM    BUN 15 07/01/2020 03:45 PM    Creatinine 1.10 07/01/2020 03:45 PM    BUN/Creatinine ratio 14 07/01/2020 03:45 PM    GFR est AA 95 07/01/2020 03:45 PM    GFR est non-AA 82 07/01/2020 03:45 PM    Calcium 10.7 (H) 07/01/2020 03:45 PM    Bilirubin, total 0.3 07/01/2020 03:45 PM    ALT (SGPT) 96 (H) 07/01/2020 03:45 PM    Alk. phosphatase 52 07/01/2020 03:45 PM    Protein, total 7.5 07/01/2020 03:45 PM    Albumin 5.4 (H) 07/01/2020 03:45 PM    Globulin 3.1 09/16/2009 03:29 PM    A-G Ratio 2.6 (H) 07/01/2020 03:45 PM      Lab Results   Component Value Date/Time    Hemoglobin A1c 5.8 (H) 05/05/2017 04:15 PM    Hemoglobin A1c (POC) 5.6 01/03/2020 09:10 AM         Review of Systems   Constitutional: Negative for malaise/fatigue. HENT: Negative for congestion. Eyes: Negative for blurred vision. Respiratory: Negative for cough and shortness of breath. Cardiovascular: Negative for chest pain, palpitations and leg swelling. Gastrointestinal: Negative for abdominal pain, constipation and heartburn. Genitourinary: Negative for dysuria, frequency and urgency. Musculoskeletal: Negative for back pain and joint pain. Neurological: Negative for dizziness, tingling and headaches. Endo/Heme/Allergies: Negative for environmental allergies. Psychiatric/Behavioral: Negative for depression. The patient does not have insomnia. Physical Exam  Vitals and nursing note reviewed. Constitutional:       Appearance: Normal appearance. He is well-developed. Comments: /86 (BP 1 Location: Left arm, BP Patient Position: Sitting)   Pulse 66   Temp 96.9 °F (36.1 °C) (Oral)   Resp 16   Ht 5' 5\" (1.651 m)   Wt 220 lb 3.2 oz (99.9 kg)   SpO2 97%   BMI 36.64 kg/m²      HENT:      Right Ear: Tympanic membrane and ear canal normal.      Left Ear: Tympanic membrane and ear canal normal.      Nose: No mucosal edema. Neck:      Thyroid: No thyromegaly. Cardiovascular:      Rate and Rhythm: Normal rate and regular rhythm. Heart sounds: Normal heart sounds. Pulmonary:      Effort: Pulmonary effort is normal.      Breath sounds: Normal breath sounds. No wheezing, rhonchi or rales. Abdominal:      General: Bowel sounds are normal.      Palpations: Abdomen is soft. There is no mass. Tenderness: There is no abdominal tenderness. Musculoskeletal:         General: Normal range of motion. Cervical back: Normal range of motion and neck supple. Right lower leg: No edema. Left lower leg: No edema. Lymphadenopathy:      Cervical: No cervical adenopathy. Skin:     General: Skin is warm and dry. Neurological:      General: No focal deficit present. Mental Status: He is alert and oriented to person, place, and time. Psychiatric:         Mood and Affect: Mood normal.         ASSESSMENT and PLAN  Diagnoses and all orders for this visit:    1. Mixed hyperlipidemia  Continue to monitor. Work on diet and exercise.  -     LIPID PANEL; Future    2. IGT (impaired glucose tolerance)  Continue to monitor. Work on diet and exercise. -     AMB POC HEMOGLOBIN A1C  -     AMB POC GLUCOSE, QUANTITATIVE, BLOOD    3. NAFLD (nonalcoholic fatty liver disease)  Will plan follow up with hepatology    4. Encounter for medication monitoring  -     METABOLIC PANEL, COMPREHENSIVE; Future  -     CBC W/O DIFF; Future  -     URINALYSIS W/ REFLEX CULTURE; Future    5. Non morbid obesity  I have reviewed/discussed the above normal BMI with the patient. I have recommended the following interventions: dietary management education, guidance, and counseling . 6. Family history of prostate cancer in father  -     PSA SCREENING (SCREENING); Future      Follow-up and Dispositions    · Return in about 6 months (around 7/12/2022). reviewed diet, exercise and weight control  cardiovascular risk and specific lipid/LDL goals reviewed  reviewed medications and side effects in detail  specific diabetic recommendations: low cholesterol diet, weight control and daily exercise discussed and glycohemoglobin and other lab monitoring discussed     I have discussed diagnosis listed in this note with pt and/or family.  I have discussed treatment plans and options and the risk/benefit analysis of those options, including safe use of medications and possible medication side effects. Through the use of shared decision making we have agreed to the above plan. The patient has received an after-visit summary and questions were answered concerning future plans and follow up. Advise pt of any urgent changes then to proceed to the ER.

## 2022-01-13 ENCOUNTER — TELEPHONE (OUTPATIENT)
Dept: FAMILY MEDICINE CLINIC | Age: 42
End: 2022-01-13

## 2022-01-13 DIAGNOSIS — R79.89 ELEVATED LFTS: ICD-10-CM

## 2022-01-13 DIAGNOSIS — K76.0 NAFLD (NONALCOHOLIC FATTY LIVER DISEASE): Primary | ICD-10-CM

## 2022-01-13 LAB
ALBUMIN SERPL-MCNC: 4.8 G/DL (ref 3.5–5)
ALBUMIN/GLOB SERPL: 1.5 {RATIO} (ref 1.1–2.2)
ALP SERPL-CCNC: 50 U/L (ref 45–117)
ALT SERPL-CCNC: 142 U/L (ref 12–78)
ANION GAP SERPL CALC-SCNC: 6 MMOL/L (ref 5–15)
APPEARANCE UR: CLEAR
AST SERPL-CCNC: 66 U/L (ref 15–37)
BACTERIA URNS QL MICRO: NEGATIVE /HPF
BILIRUB SERPL-MCNC: 0.4 MG/DL (ref 0.2–1)
BILIRUB UR QL: NEGATIVE
BUN SERPL-MCNC: 13 MG/DL (ref 6–20)
BUN/CREAT SERPL: 13 (ref 12–20)
CALCIUM SERPL-MCNC: 10.3 MG/DL (ref 8.5–10.1)
CHLORIDE SERPL-SCNC: 102 MMOL/L (ref 97–108)
CHOLEST SERPL-MCNC: 249 MG/DL
CO2 SERPL-SCNC: 26 MMOL/L (ref 21–32)
COLOR UR: NORMAL
CREAT SERPL-MCNC: 1.01 MG/DL (ref 0.7–1.3)
EPITH CASTS URNS QL MICRO: NORMAL /LPF
ERYTHROCYTE [DISTWIDTH] IN BLOOD BY AUTOMATED COUNT: 12.9 % (ref 11.5–14.5)
GLOBULIN SER CALC-MCNC: 3.2 G/DL (ref 2–4)
GLUCOSE SERPL-MCNC: 104 MG/DL (ref 65–100)
GLUCOSE UR STRIP.AUTO-MCNC: NEGATIVE MG/DL
HCT VFR BLD AUTO: 41.3 % (ref 36.6–50.3)
HDLC SERPL-MCNC: 33 MG/DL
HDLC SERPL: 7.5 {RATIO} (ref 0–5)
HGB BLD-MCNC: 13.1 G/DL (ref 12.1–17)
HGB UR QL STRIP: NEGATIVE
HYALINE CASTS URNS QL MICRO: NORMAL /LPF (ref 0–5)
KETONES UR QL STRIP.AUTO: NEGATIVE MG/DL
LDLC SERPL CALC-MCNC: 160.8 MG/DL (ref 0–100)
LEUKOCYTE ESTERASE UR QL STRIP.AUTO: NEGATIVE
MCH RBC QN AUTO: 29.7 PG (ref 26–34)
MCHC RBC AUTO-ENTMCNC: 31.7 G/DL (ref 30–36.5)
MCV RBC AUTO: 93.7 FL (ref 80–99)
NITRITE UR QL STRIP.AUTO: NEGATIVE
NRBC # BLD: 0 K/UL (ref 0–0.01)
NRBC BLD-RTO: 0 PER 100 WBC
PH UR STRIP: 5.5 [PH] (ref 5–8)
PLATELET # BLD AUTO: 282 K/UL (ref 150–400)
PMV BLD AUTO: 10.3 FL (ref 8.9–12.9)
POTASSIUM SERPL-SCNC: 4.6 MMOL/L (ref 3.5–5.1)
PROT SERPL-MCNC: 8 G/DL (ref 6.4–8.2)
PROT UR STRIP-MCNC: NEGATIVE MG/DL
PSA SERPL-MCNC: 0.8 NG/ML (ref 0.01–4)
RBC # BLD AUTO: 4.41 M/UL (ref 4.1–5.7)
RBC #/AREA URNS HPF: NORMAL /HPF (ref 0–5)
SODIUM SERPL-SCNC: 134 MMOL/L (ref 136–145)
SP GR UR REFRACTOMETRY: 1.02 (ref 1–1.03)
TRIGL SERPL-MCNC: 276 MG/DL (ref ?–150)
UA: UC IF INDICATED,UAUC: NORMAL
UROBILINOGEN UR QL STRIP.AUTO: 0.2 EU/DL (ref 0.2–1)
VLDLC SERPL CALC-MCNC: 55.2 MG/DL
WBC # BLD AUTO: 4.8 K/UL (ref 4.1–11.1)
WBC URNS QL MICRO: NORMAL /HPF (ref 0–4)

## 2022-01-13 NOTE — TELEPHONE ENCOUNTER
Pt called. LFTs are slightly higher. Refer back to hepatology. Work on diet and exercise for the cholesterol.

## 2022-03-19 PROBLEM — E66.01 SEVERE OBESITY (HCC): Status: ACTIVE | Noted: 2019-05-07

## 2022-03-19 PROBLEM — R73.02 IGT (IMPAIRED GLUCOSE TOLERANCE): Status: ACTIVE | Noted: 2018-01-24

## 2022-03-19 PROBLEM — Z80.42 FAMILY HISTORY OF PROSTATE CANCER IN FATHER: Status: ACTIVE | Noted: 2018-08-29

## 2022-03-19 PROBLEM — K76.0 NAFLD (NONALCOHOLIC FATTY LIVER DISEASE): Status: ACTIVE | Noted: 2017-03-03

## 2022-04-12 ENCOUNTER — OFFICE VISIT (OUTPATIENT)
Dept: HEMATOLOGY | Age: 42
End: 2022-04-12
Payer: COMMERCIAL

## 2022-04-12 VITALS
SYSTOLIC BLOOD PRESSURE: 128 MMHG | WEIGHT: 217.4 LBS | HEIGHT: 65 IN | DIASTOLIC BLOOD PRESSURE: 84 MMHG | TEMPERATURE: 96.9 F | BODY MASS INDEX: 36.22 KG/M2 | HEART RATE: 69 BPM

## 2022-04-12 DIAGNOSIS — E66.01 SEVERE OBESITY (BMI 35.0-39.9) WITH COMORBIDITY (HCC): ICD-10-CM

## 2022-04-12 DIAGNOSIS — K76.0 NAFLD (NONALCOHOLIC FATTY LIVER DISEASE): Primary | ICD-10-CM

## 2022-04-12 PROCEDURE — 91200 LIVER ELASTOGRAPHY: CPT | Performed by: PHYSICIAN ASSISTANT

## 2022-04-12 PROCEDURE — 99214 OFFICE O/P EST MOD 30 MIN: CPT | Performed by: PHYSICIAN ASSISTANT

## 2022-04-12 NOTE — PROGRESS NOTES
Identified pt with two pt identifiers(name and ). Reviewed record in preparation for visit and have obtained necessary documentation. Chief Complaint   Patient presents with    Fatty Liver     FS moses/Davina      Vitals:    22 1134   BP: 128/84   Pulse: 69   Temp: 96.9 °F (36.1 °C)   TempSrc: Temporal   Weight: 217 lb 6.4 oz (98.6 kg)   Height: 5' 5\" (1.651 m)   PainSc:   0 - No pain       Health Maintenance Review: Patient reminded of \"due or due soon\" health maintenance. I have asked the patient to contact his/her primary care provider (PCP) for follow-up on his/her health maintenance. Coordination of Care Questionnaire:  :   1) Have you been to an emergency room, urgent care, or hospitalized since your last visit? If yes, where when, and reason for visit? no       2. Have seen or consulted any other health care provider since your last visit? If yes, where when, and reason for visit? NO      Patient is accompanied by self I have received verbal consent from Jeannette Cook to discuss any/all medical information while they are present in the room.

## 2022-04-12 NOTE — PROGRESS NOTES
[address] [facility], [place], [address], [facility], [place]      MamtaJOSELIN Gordon, [facility]     April Maryellen Núñez, JUAN Merida, JUAN Saldaña [facility]    at [facility]    [address] [facility], [address] [place] [address], [facility].    [phone]    FAX: [address] [place] [address]    at [address], [address]    [phone]    FAX: [phone]     Patient Care Team:  Sheree Tang MD as PCP - General  Sheree Tang MD as PCP - [facility]      Problem List  Date Reviewed: 1/12/2022          Codes Class Noted    Severe obesity [facility]) ICD-10-CM: E66.01  ICD-9-CM: 278.01  5/7/2019        Family history of prostate cancer in father ICD-10-CM: Z80.42  ICD-9-CM: V16.42  8/29/2018        IGT (impaired glucose tolerance) ICD-10-CM: R73.02  ICD-9-CM: 790.22  1/24/2018        NAFLD (nonalcoholic fatty liver disease) ICD-10-CM: K76.0  ICD-9-CM: 571.8  3/3/2017        Family history of early CAD ICD-10-CM: Z82.49  ICD-9-CM: V17.3  12/30/2014        Mixed hyperlipidemia ICD-10-CM: E78.2  ICD-9-CM: 272.2  8/12/2014            This is his first return office visit in over 2 years. [facility] fatty liver disease. He had been a participant in the [facility] 5 year longitudinal study. The active problem list, all pertinent past medical history, medications, liver histology, radiologic findings and laboratory findings related to the liver disorder were reviewed with the patient. The patient is a 38 y.o.  male who was first noted to have abnormalities in liver transaminases in 2013.       Serologic evaluation for markers of chronic liver disease were all negative. Ultrasound of the liver was performed in 4/2017. The results of the imaging suggested chronic or fatty liver liver disease. The patient underwent a liver biopsy in 6/2017. This demonstrates NAFLD with 66-75% macro and micovesicular steatosis, no fibrosis. Fibroscan has also been done and the results correlate with biopsy, showing fibrosis score of 4.8 EkPa and a CAP score of 383, consistent with steatosis. This has last been repeated in 6/2019 and showed 6. 8. we plan on repeating this today. The most recent laboratory studies indicate that the liver transaminases are elevated, tests of hepatic synthetic and metabolic function are normal, and the platelet count is normal. This was most recently drawn through his PCP's office in 1/2022. He presents today at the urging of his PCP for updating of status given the recent rise in liver enzymes. He has no new abdominal complaints. The patient has no symptoms which could be attributed to the liver disorder. The patient completes all daily activities without any functional limitations. The patient has not experienced fatigue, pain in the right side over the liver. He has had some past success with carbohydrate-restricted diet in the past, but has not been able to be consistent with his diet. Of note, he has had weight gain of ~10# in the past two years. The only other medical issue he has had was passing a renal stone this past Summer. This has not recurred. ALLERGIES  No Known Allergies    MEDICATIONS  Current Outpatient Medications   Medication Sig    fenofibrate nanocrystallized (TRICOR) 145 mg tablet TAKE 1 TABLET DAILY FOR    HYPERTRIGLYCERIDEMIA    diclofenac EC (VOLTAREN) 75 mg EC tablet Take 1 Tab by mouth two (2) times daily as needed for Pain.  cyclobenzaprine (FLEXERIL) 10 mg tablet Take 1 Tab by mouth nightly as needed for Muscle Spasm(s) (and muscle tightness in the back).     ferrous sulfate (IRON PO) Take 1 Tab by mouth daily.  omega-3 fatty acids-vitamin e (FISH OIL) 1,000 mg cap Take 1 Cap by mouth two (2) times a day. No current facility-administered medications for this visit. SYSTEM REVIEW NOT RELATED TO LIVER DISEASE OR REVIEWED ABOVE:  Constitution systems: Negative for fever, chills. Weight up ~10# in past 2 years. Eyes: Negative for visual changes. ENT: Negative for sore throat, painful swallowing. Respiratory: Negative for cough, hemoptysis, SOB. Cardiology: Negative for chest pain, palpitations. GI:  Negative for constipation or diarrhea. : Negative for urinary frequency, dysuria, hematuria, nocturia. Skin: Negative for rash. Hematology: Negative for easy bruising, blood clots. Musculo-skeletal: Negative for back pain, muscle pain, weakness. Neurologic: Negative for headaches, dizziness, vertigo, memory problems not related to HE. Psychology: Negative for anxiety, depression. FAMILY HISTORY:  The father has the following chronic diseases: throat cancer. The mother has the following chronic diseases: fatty liver. There is no family history of liver disease. SOCIAL HISTORY:  The patient is . The patient has 1 child. The patient has never used tobacco products. The patient has never consumed significant amounts of alcohol. The patient currently works full time . PHYSICAL EXAMINATION:  /84 (BP 1 Location: Right arm, BP Patient Position: Sitting, BP Cuff Size: Adult)   Pulse 69   Temp 96.9 °F (36.1 °C) (Temporal)   Ht 5' 5\" (1.651 m)   Wt 217 lb 6.4 oz (98.6 kg)   BMI 36.18 kg/m²   General: No acute distress. Eyes: Sclera anicteric. ENT: No oral lesions. Thyroid normal.  Nodes: No adenopathy. Skin: No spider angiomata. No jaundice. No palmar erythema. Respiratory: Lungs clear to auscultation. Cardiovascular: Regular heart rate. No murmurs. No JVD. Abdomen: Soft non-tender.   Liver size normal to percussion/palpation. Spleen not palpable. No obvious ascites. Extremities: No edema. No muscle wasting. No gross arthritic changes. Neurologic: Alert and oriented. Cranial nerves grossly intact. No asterixis. LABORATORY STUDIES:  Liver [place] of [address] Units 1/12/2022 7/1/2020 1/3/2020   WBC 4.1 - 11.1 K/uL 4.8     HGB 12.1 - 17.0 g/dL 13.1      - 400 K/uL 282     AST 15 - 37 U/L 66 (H) 47 (H) 55 (H)   ALT 12 - 78 U/L 142 (H) 96 (H) 103 (H)   Alk Phos 45 - 117 U/L 50 52 43   Bili, Total 0.2 - 1.0 MG/DL 0.4 0.3 0.4   Bili, Direct 0.00 - 0.40 mg/dL      Albumin 3.5 - 5.0 g/dL 4.8 5.4 (H) 5.1   BUN 6 - 20 MG/DL 13 15 17   Creat 0.70 - 1.30 MG/DL 1.01 1.10 1.07   Na 136 - 145 mmol/L 134 (L) 138 137   K 3.5 - 5.1 mmol/L 4.6 3.8 4.5   Cl 97 - 108 mmol/L 102 100 99   CO2 21 - 32 mmol/L 26 22 21   Glucose 65 - 100 mg/dL 104 (H) 108 (H) 96   Additional lab values drawn at today's office visit are pending at the time of documentation. SEROLOGIES:  Serologies Latest Ref Rng & Units 5/5/2017 3/3/2017 11/2/2016   Hep B Surface Ag Negative   Negative   Ferritin 30 - 400 ng/mL  298    Iron % Saturation 15 - 55 %  27    CHRISTI Ab, Direct Negative  Negative    ASMCA 0 - 19 Units 8     Ceruloplasmin 16.0 - 31.0 mg/dL  34.3 (H)    Alpha-1 antitrypsin level 90 - 200 mg/dL  124      LIVER HISTOLOGY:  6/2017. Slides reviewed by MLS. NAFLD. 66-75% macro and micovesicular steatosis. Mild ballooning. Moderate inflammation. No fibrosis. 1/2018. FibroScan performed at [address] [address] of [facility]. EkPa was 4.8. Suggested fibrosis level is F0-1. CAP is 383, this is consistent with steatosis. 6/2019. FibroScan performed at [address] of [facility]. EkPa was 6.8. Suggested fibrosis level is F0-1. CAP is 341, this is consistent with steatosis. 4/2022. FibroScan performed at [address] of [facility]. EkPa was 9.0. Suggested fibrosis level is F3.   CAP score 374, this is suggestive of steatosis. ENDOSCOPIC PROCEDURES:  Not available or performed    RADIOLOGY:  4/2017. Ultrasound of liver. Echogenic consistent with fatty liver. No liver mass lesions. No dilated bile ducts. No ascites. OTHER TESTING:  Not available or performed    ASSESSMENT AND PLAN:  CALI with no fibrosis. Overall, the past biopsy was classified as mild CALI. Subsequent Fibroscans have shown steady increase in fibrosis values and I have recommended that we repeat liver biopsy to assess for this suggested progression. He is in agreement with this plan and I will have him follow-up with Dr Kiet Urbina following to review results. I have again encouraged his attention to weight loss with a target of ~10% of current body weight. The patient was counseled regarding diet and exercise to achieve weight loss. The best diet for patients with fatty liver is one very low in carbohydrates and enriched with protein. This has worked for him in the past and he is planning to restart this diet. I have reviewed with him in detail the results of the 1/2022 labs. Liver transaminases are elevated. Alkaline phosphate is normal.  Liver function is normal.  The platelet count is normal.     The patient was directed to continue all current medications at the current dosages. There are no contraindications for the patient to take any medications that are necessary for treatment of other medical issues including medications for diabetes mellitus and hypercholesterolemia. The patient was counseled regarding alcohol consumption and that this could contribute to fatty liver disease. All of the above issues were discussed with the patient. All questions were answered. The patient expressed a clear understanding of the above. [facility] [place] [address] within 2 weeks of liver biopsy for review of results with Dr Kiet Urbina.      Jaya Guzman PA-C  Liver [place] [address], [address] [facility] Debbi Nixon  22.  [address] [facility]

## 2022-04-13 LAB
ALBUMIN SERPL-MCNC: 5.7 G/DL (ref 4–5)
ALP SERPL-CCNC: 55 IU/L (ref 44–121)
ALT SERPL-CCNC: 102 IU/L (ref 0–44)
AST SERPL-CCNC: 51 IU/L (ref 0–40)
BILIRUB DIRECT SERPL-MCNC: 0.1 MG/DL (ref 0–0.4)
BILIRUB SERPL-MCNC: 0.3 MG/DL (ref 0–1.2)
BUN SERPL-MCNC: 15 MG/DL (ref 6–24)
BUN/CREAT SERPL: 15 (ref 9–20)
CALCIUM SERPL-MCNC: 10.5 MG/DL (ref 8.7–10.2)
CHLORIDE SERPL-SCNC: 99 MMOL/L (ref 96–106)
CO2 SERPL-SCNC: 21 MMOL/L (ref 20–29)
CREAT SERPL-MCNC: 1.03 MG/DL (ref 0.76–1.27)
EGFR: 92 ML/MIN/1.73
ERYTHROCYTE [DISTWIDTH] IN BLOOD BY AUTOMATED COUNT: 13.3 % (ref 11.6–15.4)
GLUCOSE SERPL-MCNC: 96 MG/DL (ref 65–99)
HCT VFR BLD AUTO: 39.6 % (ref 37.5–51)
HGB BLD-MCNC: 13.1 G/DL (ref 13–17.7)
INR PPP: 1 (ref 0.9–1.2)
MCH RBC QN AUTO: 30.1 PG (ref 26.6–33)
MCHC RBC AUTO-ENTMCNC: 33.1 G/DL (ref 31.5–35.7)
MCV RBC AUTO: 91 FL (ref 79–97)
PLATELET # BLD AUTO: 291 X10E3/UL (ref 150–450)
POTASSIUM SERPL-SCNC: 4.1 MMOL/L (ref 3.5–5.2)
PROT SERPL-MCNC: 8.1 G/DL (ref 6–8.5)
PROTHROMBIN TIME: 10.1 SEC (ref 9.1–12)
RBC # BLD AUTO: 4.35 X10E6/UL (ref 4.14–5.8)
SODIUM SERPL-SCNC: 139 MMOL/L (ref 134–144)
WBC # BLD AUTO: 5.9 X10E3/UL (ref 3.4–10.8)

## 2022-04-14 NOTE — PROGRESS NOTES
Pt notified via [address] letter of continued elevated liver enzymes. Follow-up for liver biopsy as scheduled.

## 2022-05-08 ENCOUNTER — PATIENT MESSAGE (OUTPATIENT)
Dept: FAMILY MEDICINE CLINIC | Age: 42
End: 2022-05-08

## 2022-05-08 DIAGNOSIS — E78.2 MIXED HYPERLIPIDEMIA: ICD-10-CM

## 2022-05-09 RX ORDER — FENOFIBRATE 145 MG/1
TABLET, COATED ORAL
Qty: 90 TABLET | Refills: 3 | Status: SHIPPED | OUTPATIENT
Start: 2022-05-09 | End: 2022-06-13 | Stop reason: SDUPTHER

## 2022-05-12 ENCOUNTER — TRANSCRIBE ORDER (OUTPATIENT)
Dept: SCHEDULING | Age: 42
End: 2022-05-12

## 2022-05-12 DIAGNOSIS — K76.0 NONALCOHOLIC FATTY LIVER DISEASE: Primary | ICD-10-CM

## 2022-06-13 DIAGNOSIS — E78.2 MIXED HYPERLIPIDEMIA: ICD-10-CM

## 2022-06-13 RX ORDER — FENOFIBRATE 145 MG/1
TABLET, COATED ORAL
Qty: 90 TABLET | Refills: 3 | Status: SHIPPED | OUTPATIENT
Start: 2022-06-13 | End: 2022-06-17 | Stop reason: SDUPTHER

## 2022-06-16 ENCOUNTER — HOSPITAL ENCOUNTER (OUTPATIENT)
Age: 42
Setting detail: OUTPATIENT SURGERY
Discharge: HOME | End: 2022-06-16
Attending: INTERNAL MEDICINE | Admitting: INTERNAL MEDICINE
Payer: COMMERCIAL

## 2022-06-16 ENCOUNTER — APPOINTMENT (OUTPATIENT)
Dept: ULTRASOUND IMAGING | Age: 42
End: 2022-06-16
Attending: INTERNAL MEDICINE
Payer: COMMERCIAL

## 2022-06-16 VITALS
DIASTOLIC BLOOD PRESSURE: 69 MMHG | BODY MASS INDEX: 36.15 KG/M2 | RESPIRATION RATE: 22 BRPM | OXYGEN SATURATION: 99 % | WEIGHT: 217 LBS | SYSTOLIC BLOOD PRESSURE: 132 MMHG | TEMPERATURE: 98.3 F | HEART RATE: 76 BPM | HEIGHT: 65 IN

## 2022-06-16 DIAGNOSIS — K76.0 NAFLD (NONALCOHOLIC FATTY LIVER DISEASE): ICD-10-CM

## 2022-06-16 DIAGNOSIS — K76.0 NONALCOHOLIC FATTY LIVER DISEASE: ICD-10-CM

## 2022-06-16 PROCEDURE — 74011000250 HC RX REV CODE- 250: Performed by: INTERNAL MEDICINE

## 2022-06-16 PROCEDURE — 76060000031 HC ANESTHESIA FIRST 0.5 HR: Performed by: INTERNAL MEDICINE

## 2022-06-16 PROCEDURE — 77030014115: Performed by: INTERNAL MEDICINE

## 2022-06-16 PROCEDURE — 77030013826 HC NDL BIOP MAXCOR BARD -B: Performed by: INTERNAL MEDICINE

## 2022-06-16 PROCEDURE — 88307 TISSUE EXAM BY PATHOLOGIST: CPT

## 2022-06-16 PROCEDURE — 76942 ECHO GUIDE FOR BIOPSY: CPT | Performed by: INTERNAL MEDICINE

## 2022-06-16 PROCEDURE — 76040000019: Performed by: INTERNAL MEDICINE

## 2022-06-16 PROCEDURE — 88313 SPECIAL STAINS GROUP 2: CPT

## 2022-06-16 PROCEDURE — 47000 NEEDLE BIOPSY OF LIVER PERQ: CPT | Performed by: INTERNAL MEDICINE

## 2022-06-16 PROCEDURE — 76942 ECHO GUIDE FOR BIOPSY: CPT

## 2022-06-16 RX ORDER — FENTANYL CITRATE 50 UG/ML
25 INJECTION, SOLUTION INTRAMUSCULAR; INTRAVENOUS
Status: DISCONTINUED | OUTPATIENT
Start: 2022-06-16 | End: 2022-06-16 | Stop reason: HOSPADM

## 2022-06-16 RX ORDER — LIDOCAINE HYDROCHLORIDE 10 MG/ML
10 INJECTION INFILTRATION; PERINEURAL ONCE
Status: COMPLETED | OUTPATIENT
Start: 2022-06-16 | End: 2022-06-16

## 2022-06-16 RX ORDER — SODIUM CHLORIDE 0.9 % (FLUSH) 0.9 %
5-40 SYRINGE (ML) INJECTION EVERY 8 HOURS
Status: DISCONTINUED | OUTPATIENT
Start: 2022-06-16 | End: 2022-06-16 | Stop reason: HOSPADM

## 2022-06-16 RX ORDER — ONDANSETRON 2 MG/ML
4 INJECTION INTRAMUSCULAR; INTRAVENOUS
Status: DISCONTINUED | OUTPATIENT
Start: 2022-06-16 | End: 2022-06-16 | Stop reason: HOSPADM

## 2022-06-16 RX ORDER — SODIUM CHLORIDE 0.9 % (FLUSH) 0.9 %
5-40 SYRINGE (ML) INJECTION AS NEEDED
Status: DISCONTINUED | OUTPATIENT
Start: 2022-06-16 | End: 2022-06-16 | Stop reason: HOSPADM

## 2022-06-16 NOTE — DISCHARGE INSTRUCTIONS
3340 [facility], MD, FACP, [facility], [place]      JOSELIN Solano, [facility]-BC     April S Brenton, [facility]   Lissa Humble, ROGER Medel, [facility]       Elisabeth Bynum [facility] 136    at [address], [address] [place] [address], Debbi  22.    [phone]    FAX: [address] [place] [address] [address], [address]    [phone]    FAX: [phone]         LIVER BIOPSY DISCHARGE INSTRUCTIONS      Michelle Villasenor  1980  Date: 6/16/2022    DIET:    Josefa Valentin may resume your previous diet. ACTIVITIES:  Rest quietly the rest of today. You should not lift any objects more than 20 pounds for the next 2 days. If you work sitting down without strenuous activity you may return to work tomorrow. If you exert yourself or do heavy lifting at work you should take tomorrow off. Do not drive or operate hazardous machinery for 12 hours after you are discharged from this procedure. SPECIAL INSTRUCTIONS:  Do not use any aspirin or non-steroidal (Motin, Advil, Naproxen, etc) pain medications for the next 2 days. You may use extra-strength Tylenol (acetaminophen) if you experience pain or discomfort later today. Restarting blood thinners: If you were taking blood thinners prior to the procedure you can restart these in 2 days. Call the [facility] [address] office if you experience any of the following:  Persistent or severe abdominal pain. Persistent or severe abdominal distention. Fever and chills   Nausea and vomiting. New or unusual symptoms. Follow-up care: You should have a follow up appointment with Dr. Beatriz Dodson to review the results of the liver biopsy results in 2 weeks.   If you do not have an appointment please call the office at the number listed above to schedule this. Other instructions: If you have any problems or questions call the [address] [place] [address] office at the phone number listed above. DISCHARGE SUMMARY from Nurse: The following personal items collected during your admission are returned to you:   Dental Appliance: Dental Appliances: None  Vision: Visual Aid: None  Hearing Aid:    Jewelry:    Clothing:    Other Valuables:    Valuables sent to safe:            Learning About Coronavirus (COVID-19)  Coronavirus (COVID-19): Overview  What is coronavirus ([id number])? The coronavirus disease (COVID-19) is caused by a virus. It is an illness that was first found in [place], [place], in December 2019. It has since spread worldwide. The virus can cause fever, cough, and trouble breathing. In severe cases, it can cause pneumonia and make it hard to breathe without help. It can cause death. Coronaviruses are a large group of viruses. They cause the common cold. They also cause more serious illnesses like Middle East respiratory syndrome (MERS) and severe acute respiratory syndrome (SARS). COVID-19 is caused by a novel coronavirus. That means it's a new type that has not been seen in people before. This virus spreads person-to-person through droplets from coughing and sneezing. It can also spread when you are close to someone who is infected. And it can spread when you touch something that has the virus on it, such as a doorknob or a tabletop. What can you do to protect yourself from coronavirus (COVID-19)? The best way to protect yourself from getting sick is to:  · Avoid areas where there is an outbreak. · Avoid contact with people who may be infected. · Wash your hands often with soap or alcohol-based hand sanitizers. · Avoid crowds and try to stay at least 6 feet away from other people. · Wash your hands often, especially after you cough or sneeze. Use soap and water, and scrub for at least 20 seconds. If soap and water aren't available, use an alcohol-based hand . · Avoid touching your mouth, nose, and eyes. What can you do to avoid spreading the virus to others? To help avoid spreading the virus to others:  · Cover your mouth with a tissue when you cough or sneeze. Then throw the tissue in the trash. · Use a disinfectant to clean things that you touch often. · Stay home if you are sick or have been exposed to the virus. Don't go to school, work, or public areas. And don't use public transportation. · If you are sick:  ? Leave your home only if you need to get medical care. But call the doctor's office first so they know you're coming. And wear a face mask, if you have one.  ? If you have a face mask, wear it whenever you're around other people. It can help stop the spread of the virus when you cough or sneeze. ? Clean and disinfect your home every day. Use household  and disinfectant wipes or sprays. Take special care to clean things that you grab with your hands. These include doorknobs, remote controls, phones, and handles on your refrigerator and microwave. And don't forget countertops, tabletops, bathrooms, and computer keyboards. When to call for help  Call 911 anytime you think you may need emergency care. For example, call if:  · You have severe trouble breathing. (You can't talk at all.)  · You have constant chest pain or pressure. · You are severely dizzy or lightheaded. · You are confused or can't think clearly. · Your face and lips have a blue color. · You pass out (lose consciousness) or are very hard to wake up. Call your doctor now if you develop symptoms such as:  · Shortness of breath. · Fever. · Cough. If you need to get care, call ahead to the doctor's office for instructions before you go. Make sure you wear a face mask, if you have one, to prevent exposing other people to the virus. Where can you get the latest information?   The following health organizations are tracking and studying this virus. Their websites contain the most up-to-date information. Darcie Cali also learn what to do if you think you may have been exposed to the virus. · U.S. Centers for Disease Control and Prevention (CDC): The CDC provides updated news about the disease and travel advice. The website also tells you how to prevent the spread of infection. www.cdc.gov  · World Health Organization [facility]): WHO offers information about the virus outbreaks. WHO also has travel advice. www.who.int  Current as of: April 1, 2020               Content Version: 12.4  © 2006-2020 Healthwise, Incorporated. Care instructions adapted under license by your healthcare professional. If you have questions about a medical condition or this instruction, always ask your healthcare professional. [address] any warranty or liability for your use of this information.

## 2022-06-16 NOTE — PROCEDURES
3340 [facility], MD, [address], [place], [place]      Judeen Rands, PA-C Hermine Spatz, [facility]-MINDY Chowdhury S Brenton, [facility]   ROGER Barrett [facility]       Elisabeth [facility] 136    at [place] [address] [address] [facility], [address] Debbi Berg  22. [phone]    FAX: [address] [place] [address] - [address]    [phone]    FAX: [phone]         LIVER BIOPSY PROCEDURE NOTE    Naman Ernestine  1980    INDICATIONS/PRE-OPERATIVE  DIAGNOSIS:  NAFLD    : David Jackson MD    SURGICAL ASSISTANT:  None    PROSTHETIC DEVICES, TISSUE GRAFTS, TRANSPLANTED ORGANS:  Not applicable    SEDATION: 1% Lidocaine injection 10 ml    PROCEDURE:  Informed consent to perform the procedure was obtained from the patient. The patient was positioned on the edge of the stretcher lying flat in the supine position. Ultrasound was utilized to image the liver. The diaphragm and any major mass lesion or vascular structures within the liver were identified. An appropriate site for liver biopsy was identified. The distance from the surface of the skin to the liver capsule was 3 cm. This area was prepped with betadine and draped in sterile fashion. The skin was infiltrated with 1% lidocaine. The deeper subcutanous tissues and liver capsule overlying the biopsy site were then infiltrated with 1% lidocaine until appropriate anesthesia was obtained. A small incision was made in the skin so the biopsy devise could be easily inserted. A total of 2 passes with the 16 gauge Bard biopsy devise was then made into the liver. Core(s) of liver tissue totaling 4 cm in length were obtained and placed into tissue fixative.   A band aid was placed over the biopsy site. The patient was then repositioned on the right side and transported to the recovery area on the stretcher for routine monitoring until discharge. The specimen was sent to pathology for processing via the normal transport mechanism. SPECIMEN COLLECTED: Liver    INTERVENTIONS:  None    ESTIMATED BLOOD LOSS: Negligible.      COMPLICATIONS:  None    POST-OPERATIVE DIAGNOSIS: Same as Pre-operative Diagnosis      Kelsie Harding MD  [address]  [address] [place] [address] 58, [address]  [address] [place] [address]

## 2022-06-16 NOTE — H&P
3340 [facility], MD, [address], [place], [place]      JOSELIN Parra, BORIS-BC     April S Brenton, [facility]   ROGER Peterson, [facility]       Elisabethnorma Bynum Jai De Leonard 136    at [facility]    [address], [address] Debbi Berg  22.    [phone]    FAX: [address] [place] [address] [address], [address]    [phone]    FAX: [phone]         PRE-PROCEDURE NOTE - LIVER BIOPSY    H and P from last office visit reviewed. Allergies reviewed. Out-patient medication list reviewed. Patient Active Problem List   Diagnosis Code    Mixed hyperlipidemia E78.2    Family history of early CAD Z80.55    NAFLD (nonalcoholic fatty liver disease) K76.0    IGT (impaired glucose tolerance) R73.02    Family history of prostate cancer in father Z80.41    Severe obesity ([address] E66.01       No Known Allergies    No current facility-administered medications on file prior to encounter. Current Outpatient Medications on File Prior to Encounter   Medication Sig Dispense Refill    ferrous sulfate (IRON PO) Take 1 Tab by mouth daily.  omega-3 fatty acids-vitamin e (FISH OIL) 1,000 mg cap Take 1 Cap by mouth two (2) times a day.  diclofenac EC (VOLTAREN) 75 mg EC tablet Take 1 Tab by mouth two (2) times daily as needed for Pain. 30 Tab 1    cyclobenzaprine (FLEXERIL) 10 mg tablet Take 1 Tab by mouth nightly as needed for Muscle Spasm(s) (and muscle tightness in the back). 30 Tab 0       For liver biopsy to assess NALFD. The risks of the procedure were discussed with the patient. This included bleeding, pain, and puncture of other organs. All questions were answered. The patient wishes to proceed with the procedure.     The patient was counseled at length about the risks of mariana Covid-19 in the wolfgang-operative and post-operative states including the recovery window of their procedure. The patient was made aware that mariana Covid-19 after a surgical procedure may worsen their prognosis for recovering from the virus and lend to a higher morbidity and or mortality risk. The patient was given the options of postponing their procedure. All of the risks, benefits, and alternatives were discussed. The patient does  wish to proceed with the procedure. PHYSICAL EXAMINATION:  Visit Vitals  /82   Pulse (!) 50   Temp 98.2 °F (36.8 °C)   Resp 16   Ht 5' 5\" (1.651 m)   Wt 217 lb (98.4 kg)   SpO2 98%   BMI 36.11 kg/m²       General: No acute distress. Eyes: Sclera anicteric. ENT: No oral lesions. Thyroid normal.  Nodes: No adenopathy. Skin: No spider angiomata. No jaundice. No palmar erythema. Respiratory: Lungs clear to auscultation. Cardiovascular: Regular heart rate. No murmurs. No JVD. Abdomen: Soft non-tender, liver size normal to percussion/palpation. Spleen not palpable. No obvious ascites. Extremities: No edema. No muscle wasting. No gross arthritic changes. Neurologic: Alert and oriented. Cranial nerves grossly intact. No asterixis. LABS:  Lab Results   Component Value Date/Time    WBC 5.9 04/12/2022 12:25 PM    HGB 13.1 04/12/2022 12:25 PM    HCT 39.6 04/12/2022 12:25 PM    PLATELET 784 61/60/4083 12:25 PM    MCV 91 04/12/2022 12:25 PM     Lab Results   Component Value Date/Time    INR 1.0 04/12/2022 12:25 PM    INR 0.9 05/05/2017 04:15 PM    Prothrombin time 10.1 04/12/2022 12:25 PM    Prothrombin time 9.8 05/05/2017 04:15 PM       ASSESSMENT AND PLAN:  Liver biopsy under ultrasound guidance.     John Ryan MD  [facility] [address] [place] [address], [address] [place] [address] 22.  [phone]  [address]

## 2022-06-17 DIAGNOSIS — E78.2 MIXED HYPERLIPIDEMIA: ICD-10-CM

## 2022-06-17 RX ORDER — FENOFIBRATE 145 MG/1
TABLET, COATED ORAL
Qty: 90 TABLET | Refills: 3 | Status: SHIPPED | OUTPATIENT
Start: 2022-06-17 | End: 2022-08-30 | Stop reason: SDUPTHER

## 2022-06-17 NOTE — TELEPHONE ENCOUNTER
Last visit:1/12/22  Next visit:not scheduled  Previous refill 6/13/22(90+3R)    Requested Prescriptions     Pending Prescriptions Disp Refills    fenofibrate nanocrystallized (TRICOR) 145 mg tablet 90 Tablet 3     Sig: Take 1 tablet by mouth daily     For [facility] Intervention Detail: New Rx: 1, reason: Patient Preference   Time Spent (min): 5

## 2022-07-01 ENCOUNTER — VIRTUAL VISIT (OUTPATIENT)
Dept: HEMATOLOGY | Age: 42
End: 2022-07-01
Payer: COMMERCIAL

## 2022-07-01 DIAGNOSIS — K75.81 NASH (NONALCOHOLIC STEATOHEPATITIS): Primary | ICD-10-CM

## 2022-07-01 PROCEDURE — 99214 OFFICE O/P EST MOD 30 MIN: CPT | Performed by: INTERNAL MEDICINE

## 2022-07-01 NOTE — PROGRESS NOTES
3340 [facility], MD, [place], [facility], [place]      JOSELIN Us, [facility]-BC     April Ricky Bradford, JUAN Sharma, JUAN Snyder [facility] 136    at [facility]    [address] [facility], [address] [place] [address], [facility] 22.    [phone]    FAX: [address] [place] [address]    at [address], [address]    [phone]    FAX: [phone]       Patient Care Team:  Leonie Vo MD as PCP - General  Leonie Vo MD as PCP - [facility] Provider      Problem List  Date Reviewed: 7/10/2022          Codes Class Noted    Severe obesity ([address] ICD-10-CM: E66.01  ICD-9-CM: 278.01  5/7/2019        CALI (nonalcoholic steatohepatitis) ICD-10-CM: D30.14  ICD-9-CM: 571.8  3/3/2017        Mixed hyperlipidemia ICD-10-CM: A72.2  ICD-9-CM: 272.2  8/12/2014            CALI.  (911)  3. Study     Malik Conteh is being seen at [facility] for management of non-alcoholic fatty liver disease (NAFLD). The active problem list, all pertinent past medical history, medications, liver histology, radiologic findings and laboratory findings related to the liver disorder were reviewed and discussed with the patient. The patient is a 38 y.o.  male who was found to have abnormalities in liver transaminases in 2013. The patient underwent a liver biopsy in 6/2017. This demonstrates NAFLD with 66-75% macro and micovesicular steatosis, no fibrosis. The patient underwent a repeat liver biopsy in 6/2022. The procedure was well tolerated. I have personally reviewed and interpreted the liver biopsy slides. This demonstrates CALI with severe steatosis and stage 3 bridging fibrosis.       The patient does not have any symptoms which could be attributed to the liver disorder. The patient is not experiencing the following symptoms which are commonly seen in this liver disorder:   fatigue,   pain in the right side over the liver,     The patient completes all daily activities without any functional limitations. ASSESSMENT AND PLAN:  CALI  The diagnosis is based upon liver biopsy, Fiboscan CAP score, features of metabolic syndrome, serologic studies that are negative for other causes of chronic liver disease,     A liver biopsy performed in 6/2017 demonstrates NAFL with severe steatosis, mild inflammation but no ballooning or  Fibrosis. A repeat liver biopsy in 6/2022 demonstrates severe steatosis, moderate inflammaiton, mild ballooning, stage 3 fibrosis. The patient has had 3 fibroscans betwene the 2 biopsies. The liver stiffness has increased with each scan from 4.6 to 6.8 to 9.0 kPa. Liver transaminases are persistently elevated daing back to 2108. ALP is normal.  Liver function is normal.  The platelet count is normal.      If the patient looses 20% of current body weight, which is 42 pounds, down to a weight of of 180 pounds, all steatosis will have resolved. Once all steatosis has resolved all inflammation will resolve. Then all fibrosis will gradually resolve and the liver could eventually be normal.    There is currently no FDA approved medical treatment for fatty liver, NALFD or CALI. The only medical treatments for CALI are though clinical trials. The patient would like to participate in a clinical trial.    Counseling for diet and weight loss in patients with confirmed or suspected NAFLD  The patient was counseled regarding diet and exercise to achieve weight loss.   The best diet for patients with fatty liver is one very low in carbohydrates and enriched with protein such as an Deny's program.      The patient was told not to consume any food products and drinks containing fructose as this enhances hepatic fat synthesis. There is no medication or vitamin supplements that we advocate for CALI. Using glitazones in patients without diabetes mellitus has been shown to reduce fat content in the liver but has no effect on fibrosis and is associated with weight gain. Vitamin E has also been used but the data is not very good and most experts no longer advocate this. Treatment of other medical problems in patients with chronic liver disease  There are no contraindications for the patient to take most medications that are necessary for treatment of other medical issues. Counseling for alcohol in patients with chronic liver disease  The patient was counseled regarding alcohol consumption and the effect of alcohol on chronic liver disease. The patient does not consume any significant amount of alcohol. Vaccinations   The need for vaccination against viral hepatitis A and B will be assessed with serologic and instituted as appropriate. The patient has received 2 doses and the booster dose of COVID-19 vaccine. Routine vaccinations against other bacterial and viral agents can be performed as indicated. Annual flu vaccination should be administered if indicated. ALLERGIES  No Known Allergies    MEDICATIONS  Current Outpatient Medications   Medication Sig    fenofibrate nanocrystallized (TRICOR) 145 mg tablet Take 1 tablet by mouth daily    diclofenac EC (VOLTAREN) 75 mg EC tablet Take 1 Tab by mouth two (2) times daily as needed for Pain.  cyclobenzaprine (FLEXERIL) 10 mg tablet Take 1 Tab by mouth nightly as needed for Muscle Spasm(s) (and muscle tightness in the back).  ferrous sulfate (IRON PO) Take 1 Tab by mouth daily.  omega-3 fatty acids-vitamin e (FISH OIL) 1,000 mg cap Take 1 Cap by mouth two (2) times a day. No current facility-administered medications for this visit.        SYSTEM REVIEW NOT RELATED TO LIVER DISEASE OR REVIEWED ABOVE:  Constitution systems: Negative for fever, chills. Weight up ~10# in past 2 years. Eyes: Negative for visual changes. ENT: Negative for sore throat, painful swallowing. Respiratory: Negative for cough, hemoptysis, SOB. Cardiology: Negative for chest pain, palpitations. GI:  Negative for constipation or diarrhea. : Negative for urinary frequency, dysuria, hematuria, nocturia. Skin: Negative for rash. Hematology: Negative for easy bruising, blood clots. Musculo-skeletal: Negative for back pain, muscle pain, weakness. Neurologic: Negative for headaches, dizziness, vertigo, memory problems not related to HE. Psychology: Negative for anxiety, depression. FAMILY HISTORY:  The father has the following chronic diseases: throat cancer. The mother has the following chronic diseases: fatty liver. There is no family history of liver disease. SOCIAL HISTORY:  The patient is . The patient has 1 child. The patient has never used tobacco products. The patient has never consumed significant amounts of alcohol. The patient currently works full time . PHYSICAL EXAMINATION:  There were no vitals taken for this visit. General: No acute distress. Eyes: Sclera anicteric. ENT: No oral lesions. Thyroid normal.  Nodes: No adenopathy. Skin: No spider angiomata. No jaundice. No palmar erythema. Respiratory: Lungs clear to auscultation. Cardiovascular: Regular heart rate. No murmurs. No JVD. Abdomen: Soft non-tender. Liver size normal to percussion/palpation. Spleen not palpable. No obvious ascites. Extremities: No edema. No muscle wasting. No gross arthritic changes. Neurologic: Alert and oriented. Cranial nerves grossly intact. No asterixis.     LABORATORY STUDIES:  Liver [place] of [address] [place] [address] Units 1/12/2022 7/1/2020 1/3/2020   WBC 4.1 - 11.1 K/uL 4.8     HGB 12.1 - 17.0 g/dL 13.1      - 400 K/uL 282     AST 15 - 37 U/L 66 (H) 47 (H) 55 (H)   ALT 12 - 78 U/L 142 (H) 96 (H) 103 (H)   Alk Phos 45 - 117 U/L 50 52 43   Bili, Total 0.2 - 1.0 MG/DL 0.4 0.3 0.4   Bili, Direct 0.00 - 0.40 mg/dL      Albumin 3.5 - 5.0 g/dL 4.8 5.4 (H) 5.1   BUN 6 - 20 MG/DL 13 15 17   Creat 0.70 - 1.30 MG/DL 1.01 1.10 1.07   Na 136 - 145 mmol/L 134 (L) 138 137   K 3.5 - 5.1 mmol/L 4.6 3.8 4.5   Cl 97 - 108 mmol/L 102 100 99   CO2 21 - 32 mmol/L 26 22 21   Glucose 65 - 100 mg/dL 104 (H) 108 (H) 96     SEROLOGIES:  Serologies Latest Ref Rng & Units 5/5/2017 3/3/2017 11/2/2016   Hep B Surface Ag Negative   Negative   Ferritin 30 - 400 ng/mL  298    Iron % Saturation 15 - 55 %  27    CHRISTI Ab, Direct Negative  Negative    ASMCA 0 - 19 Units 8     Ceruloplasmin 16.0 - 31.0 mg/dL  34.3 (H)    Alpha-1 antitrypsin level 90 - 200 mg/dL  124      LIVER HISTOLOGY:  6/2017. Slides reviewed by MLS. NAFLD. 66-75% macro and micovesicular steatosis. Mild ballooning. Moderate inflammation. No fibrosis. 1/2018. FibroScan performed at [address]. EkPa was 4.8. Suggested fibrosis level is F0-1. CAP is 383, this is consistent with steatosis. 6/2019. FibroScan performed at [address]. EkPa was 6.8. Suggested fibrosis level is F0-1. CAP is 341, this is consistent with steatosis. 4/2022. FibroScan performed at [address]. EkPa was 9.0. Suggested fibrosis level is F3. CAP score 374, this is suggestive of steatosis. 6/20222. Slides reviewed by MLS. CALI. 66-75% macrovesicualr and micovesicular steatosis, moderate inflammation, mild ballooning, Stage 3 fibrosis. ROXANE (321). ENDOSCOPIC PROCEDURES:  Not available or performed    RADIOLOGY:  4/2017. Ultrasound of liver. Echogenic consistent with fatty liver. No liver mass lesions. No dilated bile ducts. No ascites. OTHER TESTING:  Not available or performed    FOLLOW-UP:  All of the issues listed above in the Assessment and Plan were discussed with the patient.   All questions were answered. The patient expressed a clear understanding of the above. [address] [place] [address] in 2-4 weeks for screening and enrollment into a clinical trial for treatment of CALI. The patient will given a follow-up appointment for 4 months if she decides not to enter or is excluded from entering the clinical trial.  for routine monitoring.       MD Adarsh Jackson[address] 22.  [phone]  [address] [place] [address]

## 2022-07-01 NOTE — PROGRESS NOTES
Identified pt with two pt identifiers(name and ). Reviewed record in preparation for visit and have obtained necessary documentation. Chief Complaint   Patient presents with    Fatty Liver     VV LBX 2wks f/u with MLS      There were no vitals filed for this visit. Health Maintenance Review: Patient reminded of \"due or due soon\" health maintenance. I have asked the patient to contact his/her primary care provider (PCP) for follow-up on his/her health maintenance. Coordination of Care Questionnaire:  :   1) Have you been to an emergency room, urgent care, or hospitalized since your last visit? If yes, where when, and reason for visit? no       2. Have seen or consulted any other health care provider since your last visit? If yes, where when, and reason for visit? NO      Patient is accompanied by self I have received verbal consent from Asia Johnson to discuss any/all medical information while they are present in the room.

## 2022-07-10 PROBLEM — R73.02 IGT (IMPAIRED GLUCOSE TOLERANCE): Status: RESOLVED | Noted: 2018-01-24 | Resolved: 2022-07-10

## 2022-07-10 PROBLEM — K75.81 NASH (NONALCOHOLIC STEATOHEPATITIS): Status: ACTIVE | Noted: 2017-03-03

## 2022-07-10 PROBLEM — Z80.42 FAMILY HISTORY OF PROSTATE CANCER IN FATHER: Status: RESOLVED | Noted: 2018-08-29 | Resolved: 2022-07-10

## 2022-08-30 DIAGNOSIS — E78.2 MIXED HYPERLIPIDEMIA: ICD-10-CM

## 2022-08-31 RX ORDER — FENOFIBRATE 145 MG/1
TABLET, COATED ORAL
Qty: 90 TABLET | Refills: 3 | Status: SHIPPED | OUTPATIENT
Start: 2022-08-31

## 2022-12-19 ENCOUNTER — HOSPITAL ENCOUNTER (EMERGENCY)
Age: 42
Discharge: HOME | End: 2022-12-19
Attending: EMERGENCY MEDICINE
Payer: COMMERCIAL

## 2022-12-19 ENCOUNTER — APPOINTMENT (OUTPATIENT)
Dept: ULTRASOUND IMAGING | Age: 42
End: 2022-12-19
Attending: EMERGENCY MEDICINE
Payer: COMMERCIAL

## 2022-12-19 VITALS
DIASTOLIC BLOOD PRESSURE: 77 MMHG | TEMPERATURE: 97.9 F | BODY MASS INDEX: 34.3 KG/M2 | HEIGHT: 66 IN | HEART RATE: 60 BPM | WEIGHT: 213.41 LBS | SYSTOLIC BLOOD PRESSURE: 157 MMHG | RESPIRATION RATE: 18 BRPM | OXYGEN SATURATION: 99 %

## 2022-12-19 DIAGNOSIS — R10.13 ABDOMINAL PAIN, EPIGASTRIC: Primary | ICD-10-CM

## 2022-12-19 LAB
ALBUMIN SERPL-MCNC: 4.5 G/DL (ref 3.5–5)
ALBUMIN/GLOB SERPL: 1.4 {RATIO} (ref 1.1–2.2)
ALP SERPL-CCNC: 75 U/L (ref 45–117)
ALT SERPL-CCNC: 81 U/L (ref 12–78)
ANION GAP SERPL CALC-SCNC: 6 MMOL/L (ref 5–15)
APPEARANCE UR: CLEAR
AST SERPL-CCNC: 30 U/L (ref 15–37)
BACTERIA URNS QL MICRO: NEGATIVE /HPF
BASOPHILS # BLD: 0 K/UL (ref 0–0.1)
BASOPHILS NFR BLD: 0 % (ref 0–1)
BILIRUB SERPL-MCNC: 0.4 MG/DL (ref 0.2–1)
BILIRUB UR QL: NEGATIVE
BUN SERPL-MCNC: 16 MG/DL (ref 6–20)
BUN/CREAT SERPL: 15 (ref 12–20)
CALCIUM SERPL-MCNC: 9.7 MG/DL (ref 8.5–10.1)
CHLORIDE SERPL-SCNC: 102 MMOL/L (ref 97–108)
CO2 SERPL-SCNC: 25 MMOL/L (ref 21–32)
COLOR UR: NORMAL
COMMENT, HOLDF: NORMAL
CREAT SERPL-MCNC: 1.05 MG/DL (ref 0.7–1.3)
DIFFERENTIAL METHOD BLD: NORMAL
EOSINOPHIL # BLD: 0.1 K/UL (ref 0–0.4)
EOSINOPHIL NFR BLD: 2 % (ref 0–7)
EPITH CASTS URNS QL MICRO: NORMAL /LPF
ERYTHROCYTE [DISTWIDTH] IN BLOOD BY AUTOMATED COUNT: 12.2 % (ref 11.5–14.5)
GLOBULIN SER CALC-MCNC: 3.3 G/DL (ref 2–4)
GLUCOSE SERPL-MCNC: 131 MG/DL (ref 65–100)
GLUCOSE UR STRIP.AUTO-MCNC: NEGATIVE MG/DL
HCT VFR BLD AUTO: 38 % (ref 36.6–50.3)
HGB BLD-MCNC: 12.8 G/DL (ref 12.1–17)
HGB UR QL STRIP: NEGATIVE
HYALINE CASTS URNS QL MICRO: NORMAL /LPF (ref 0–2)
IMM GRANULOCYTES # BLD AUTO: 0 K/UL (ref 0–0.04)
IMM GRANULOCYTES NFR BLD AUTO: 0 % (ref 0–0.5)
KETONES UR QL STRIP.AUTO: NEGATIVE MG/DL
LEUKOCYTE ESTERASE UR QL STRIP.AUTO: NEGATIVE
LIPASE SERPL-CCNC: 148 U/L (ref 73–393)
LYMPHOCYTES # BLD: 2.2 K/UL (ref 0.8–3.5)
LYMPHOCYTES NFR BLD: 35 % (ref 12–49)
MCH RBC QN AUTO: 30.3 PG (ref 26–34)
MCHC RBC AUTO-ENTMCNC: 33.7 G/DL (ref 30–36.5)
MCV RBC AUTO: 89.8 FL (ref 80–99)
MONOCYTES # BLD: 0.5 K/UL (ref 0–1)
MONOCYTES NFR BLD: 7 % (ref 5–13)
NEUTS SEG # BLD: 3.6 K/UL (ref 1.8–8)
NEUTS SEG NFR BLD: 56 % (ref 32–75)
NITRITE UR QL STRIP.AUTO: NEGATIVE
NRBC # BLD: 0 K/UL (ref 0–0.01)
NRBC BLD-RTO: 0 PER 100 WBC
PH UR STRIP: 7 [PH] (ref 5–8)
PLATELET # BLD AUTO: 274 K/UL (ref 150–400)
PMV BLD AUTO: 10.1 FL (ref 8.9–12.9)
POTASSIUM SERPL-SCNC: 3.8 MMOL/L (ref 3.5–5.1)
PROT SERPL-MCNC: 7.8 G/DL (ref 6.4–8.2)
PROT UR STRIP-MCNC: NEGATIVE MG/DL
RBC # BLD AUTO: 4.23 M/UL (ref 4.1–5.7)
RBC #/AREA URNS HPF: NORMAL /HPF (ref 0–5)
SAMPLES BEING HELD,HOLD: NORMAL
SODIUM SERPL-SCNC: 133 MMOL/L (ref 136–145)
SP GR UR REFRACTOMETRY: 1.02
UA: UC IF INDICATED,UAUC: NORMAL
UROBILINOGEN UR QL STRIP.AUTO: 0.2 EU/DL (ref 0.2–1)
WBC # BLD AUTO: 6.4 K/UL (ref 4.1–11.1)
WBC URNS QL MICRO: NORMAL /HPF (ref 0–4)

## 2022-12-19 PROCEDURE — 85025 COMPLETE CBC W/AUTO DIFF WBC: CPT

## 2022-12-19 PROCEDURE — 99284 EMERGENCY DEPT VISIT MOD MDM: CPT

## 2022-12-19 PROCEDURE — 36415 COLL VENOUS BLD VENIPUNCTURE: CPT

## 2022-12-19 PROCEDURE — 80053 COMPREHEN METABOLIC PANEL: CPT

## 2022-12-19 PROCEDURE — 83690 ASSAY OF LIPASE: CPT

## 2022-12-19 PROCEDURE — 81001 URINALYSIS AUTO W/SCOPE: CPT

## 2022-12-19 PROCEDURE — 74011250636 HC RX REV CODE- 250/636: Performed by: EMERGENCY MEDICINE

## 2022-12-19 PROCEDURE — 76705 ECHO EXAM OF ABDOMEN: CPT

## 2022-12-19 RX ORDER — ONDANSETRON 4 MG/1
4 TABLET, FILM COATED ORAL
Qty: 20 TABLET | Refills: 0 | Status: SHIPPED | OUTPATIENT
Start: 2022-12-19

## 2022-12-19 RX ORDER — DICYCLOMINE HYDROCHLORIDE 20 MG/1
20 TABLET ORAL EVERY 6 HOURS
Qty: 20 TABLET | Refills: 0 | Status: SHIPPED | OUTPATIENT
Start: 2022-12-19

## 2022-12-19 RX ORDER — TRAMADOL HYDROCHLORIDE 50 MG/1
50 TABLET ORAL
Qty: 12 TABLET | Refills: 0 | Status: SHIPPED | OUTPATIENT
Start: 2022-12-19 | End: 2022-12-22

## 2022-12-19 RX ORDER — PANTOPRAZOLE SODIUM 40 MG/1
40 TABLET, DELAYED RELEASE ORAL DAILY
Qty: 20 TABLET | Refills: 0 | Status: SHIPPED | OUTPATIENT
Start: 2022-12-19 | End: 2023-01-08

## 2022-12-19 RX ADMIN — SODIUM CHLORIDE 500 ML: 9 INJECTION, SOLUTION INTRAVENOUS at 02:14

## 2022-12-19 NOTE — Clinical Note
[address]  [facility] EMERGENCY DEPT  [address] [place] [address] Line [address]  [phone]    Work/School Note    Date: 12/19/2022    To Whom It May concern:      Sabino Leonardo was seen and treated today in the emergency room by the following provider(s):  Attending Provider: Blaise Moser MD.      Sabino Leonardo is excused from work/school on 12/19/22. He is clear to return to work/school on 12/20/22.         Sincerely,          Dioni Quinn MD

## 2022-12-19 NOTE — Clinical Note
[address]  [facility] EMERGENCY DEPT  [address] [place] [address]  Jen Duong [address]  [phone]    Work/School Note    Date: 12/19/2022    To Whom It May concern:      Rere Roche was seen and treated today in the emergency room by the following provider(s):  Attending Provider: Geoff Cervantes MD.      Rere Roche is excused from work/school on 12/19/22. He is clear to return to work/school on 12/20/22.         Sincerely,          Coreen Massey MD

## 2022-12-19 NOTE — ED PROVIDER NOTES
EMERGENCY DEPARTMENT HISTORY AND PHYSICAL EXAM      Date: 12/19/2022  Patient Name: Tray Grimaldo    History of Presenting Illness     Chief Complaint   Patient presents with    Abdominal Pain     ED visit d/t abd pain, upper and general - onset of sxs. 2030 - insidious onset, progressing towards severe pain - mild nausea without vomiting - Denies fevers / D / abd surgeries / fall / trauma / urinary sxs;;        History Provided By: Patient    HPI: Tray Grimaldo, 38 y.o. male with PMHx significant for elevated LFTs/fatty liver disease, hyperlipidemia presents to the ED with upper abdominal pain that radiates to his back. Symptoms started at 830 last night after he ate a dinner of salmon. He drank some cranberry juice at about 10:30 PM and felt a little bit better. Then he woke up from sleep at 12:30 AM with severe pain in his epigastric area that radiated to his right upper quadrant and back. He has had 2 previous episodes over the last year or 2 that were similar. Work-up at those times (negative. He denies any dysuria, hematuria, urinary frequency. States his pain was sharp and crampy in nature. At its worst it was 9 out of 10 in intensity, but it is markedly subsided and is now a 1 or 2 out of 10. Denies any vomiting but has had some mild nausea. .  Denies any fevers, chills, dysuria, hematuria, urinary frequency. He is followed by Toby Rodriguez with Dr. Trine Blizzard for his liver disease. He does not have a general GI specialist.  Denies history of abdominal surgeries. PCP: Susan Cagle MD    No current facility-administered medications on file prior to encounter. Current Outpatient Medications on File Prior to Encounter   Medication Sig Dispense Refill    fenofibrate nanocrystallized (TRICOR) 145 mg tablet Take 1 tablet by mouth daily 90 Tablet 3    diclofenac EC (VOLTAREN) 75 mg EC tablet Take 1 Tab by mouth two (2) times daily as needed for Pain.  30 Tab 1    cyclobenzaprine (FLEXERIL) 10 mg tablet Take 1 Tab by mouth nightly as needed for Muscle Spasm(s) (and muscle tightness in the back). 30 Tab 0    ferrous sulfate (IRON PO) Take 1 Tab by mouth daily. omega-3 fatty acids-vitamin e (FISH OIL) 1,000 mg cap Take 1 Cap by mouth two (2) times a day. Past History     Past Medical History:  No past medical history on file. Past Surgical History:  Past Surgical History:   Procedure Laterality Date    HX VASECTOMY  12/2009    HX VASECTOMY      HX WISDOM TEETH EXTRACTION         Family History:  Family History   Problem Relation Age of Onset    Heart Disease Father     Cancer Father         throat    Hypertension Father     Heart Attack Father     Hypertension Mother     High Cholesterol Mother     Drug Abuse Brother        Social History:  Social History     Tobacco Use    Smoking status: Never    Smokeless tobacco: Never   Vaping Use    Vaping Use: Never used   Substance Use Topics    Alcohol use: No     Alcohol/week: 0.0 standard drinks    Drug use: Never       Allergies:  No Known Allergies      Review of Systems   Review of Systems   Constitutional:  Negative for chills and fever. HENT: Negative. Respiratory:  Negative for cough, chest tightness, shortness of breath and wheezing. Cardiovascular:  Negative for chest pain and palpitations. Gastrointestinal:  Positive for abdominal pain and nausea. Negative for constipation, diarrhea and vomiting. Genitourinary:  Negative for flank pain and hematuria. Musculoskeletal:  Positive for back pain. Negative for myalgias and neck pain. Skin: Negative. Negative for color change. Neurological:  Negative for dizziness, syncope, light-headedness and headaches. Psychiatric/Behavioral:  Negative for confusion. The patient is nervous/anxious. All other systems reviewed and are negative.       Physical Exam   General appearance - well nourished, well appearing, and in no distress  Eyes - pupils equal and reactive, extraocular eye movements intact  ENT - mucous membranes moist, pharynx normal without lesions  Neck - supple, no significant adenopathy; non-tender to palpation  Chest - clear to auscultation, no wheezes, rales or rhonchi; non-tender to palpation  Heart - normal rate and regular rhythm, S1 and S2 normal, no murmurs noted  Abdomen - soft, mild tender to palpation epigastric and right upper quadrant areas, no rebound or guarding, nondistended, no masses or organomegaly  Musculoskeletal - no joint tenderness, deformity or swelling; normal ROM  Extremities - peripheral pulses normal, no pedal edema  Skin - normal coloration and turgor, no rashes  Neurological - alert, oriented x3, normal speech, no focal findings or movement disorder noted    Diagnostic Study Results     Labs -     Recent Results (from the past 12 hour(s))   CBC WITH AUTOMATED DIFF    Collection Time: 12/19/22  1:58 AM   Result Value Ref Range    WBC 6.4 4.1 - 11.1 K/uL    RBC 4.23 4. 10 - 5.70 M/uL    HGB 12.8 12.1 - 17.0 g/dL    HCT 38.0 36.6 - 50.3 %    MCV 89.8 80.0 - 99.0 FL    MCH 30.3 26.0 - 34.0 PG    MCHC 33.7 30.0 - 36.5 g/dL    RDW 12.2 11.5 - 14.5 %    PLATELET 040 228 - 099 K/uL    MPV 10.1 8.9 - 12.9 FL    NRBC 0.0 0  WBC    ABSOLUTE NRBC 0.00 0.00 - 0.01 K/uL    NEUTROPHILS 56 32 - 75 %    LYMPHOCYTES 35 12 - 49 %    MONOCYTES 7 5 - 13 %    EOSINOPHILS 2 0 - 7 %    BASOPHILS 0 0 - 1 %    IMMATURE GRANULOCYTES 0 0.0 - 0.5 %    ABS. NEUTROPHILS 3.6 1.8 - 8.0 K/UL    ABS. LYMPHOCYTES 2.2 0.8 - 3.5 K/UL    ABS. MONOCYTES 0.5 0.0 - 1.0 K/UL    ABS. EOSINOPHILS 0.1 0.0 - 0.4 K/UL    ABS. BASOPHILS 0.0 0.0 - 0.1 K/UL    ABS. IMM.  GRANS. 0.0 0.00 - 0.04 K/UL    DF AUTOMATED     METABOLIC PANEL, COMPREHENSIVE    Collection Time: 12/19/22  1:58 AM   Result Value Ref Range    Sodium 133 (L) 136 - 145 mmol/L    Potassium 3.8 3.5 - 5.1 mmol/L    Chloride 102 97 - 108 mmol/L    CO2 25 21 - 32 mmol/L    Anion gap 6 5 - 15 mmol/L    Glucose 131 (H) 65 - 100 mg/dL    BUN 16 6 - 20 MG/DL    Creatinine 1.05 0.70 - 1.30 MG/DL    BUN/Creatinine ratio 15 12 - 20      eGFR >60 >60 ml/min/1.73m2    Calcium 9.7 8.5 - 10.1 MG/DL    Bilirubin, total 0.4 0.2 - 1.0 MG/DL    ALT (SGPT) 81 (H) 12 - 78 U/L    AST (SGOT) 30 15 - 37 U/L    Alk. phosphatase 75 45 - 117 U/L    Protein, total 7.8 6.4 - 8.2 g/dL    Albumin 4.5 3.5 - 5.0 g/dL    Globulin 3.3 2.0 - 4.0 g/dL    A-G Ratio 1.4 1.1 - 2.2     SAMPLES BEING HELD    Collection Time: 12/19/22  1:58 AM   Result Value Ref Range    SAMPLES BEING HELD RD     COMMENT        Add-on orders for these samples will be processed based on acceptable specimen integrity and analyte stability, which may vary by analyte. LIPASE    Collection Time: 12/19/22  1:58 AM   Result Value Ref Range    Lipase 148 73 - 393 U/L   URINALYSIS W/ REFLEX CULTURE    Collection Time: 12/19/22  2:41 AM    Specimen: Urine   Result Value Ref Range    Color YELLOW/STRAW      Appearance CLEAR CLEAR      Specific gravity 1.020      pH (UA) 7.0 5.0 - 8.0      Protein Negative NEG mg/dL    Glucose Negative NEG mg/dL    Ketone Negative NEG mg/dL    Bilirubin Negative NEG      Blood Negative NEG      Urobilinogen 0.2 0.2 - 1.0 EU/dL    Nitrites Negative NEG      Leukocyte Esterase Negative NEG      UA:UC IF INDICATED CULTURE NOT INDICATED BY UA RESULT CNI      WBC 0-4 0 - 4 /hpf    RBC 0-5 0 - 5 /hpf    Epithelial cells FEW FEW /lpf    Bacteria Negative NEG /hpf    Hyaline cast 0-2 0 - 2 /lpf       Radiologic Studies -   US ABD LTD   Final Result      Hepatic steatosis            CT Results  (Last 48 hours)      None          CXR Results  (Last 48 hours)      None              Medical Decision Making   I am the first provider for this patient. I reviewed the vital signs, available nursing notes, past medical history, past surgical history, family history and social history. Vital Signs-Reviewed the patient's vital signs.   Patient Vitals for the past 12 hrs:   Temp Pulse Resp BP SpO2   12/19/22 0151 97.9 °F (36.6 °C) 60 18 (!) 157/77 99 %           Records Reviewed: Nursing Notes and Old Medical Records    Provider Notes (Medical Decision Making):   Patient presents to the ED with epigastric and right upper quadrant abdominal pain after eating dinner. Several previous episodes with negative work-ups. Differential diagnosis includes gastritis, pancreatitis, cholecystitis, biliary colic  Will obtain CBC, CMP, lipase, UA, right upper quadrant ultrasound. ED Course:   Initial assessment performed. The patients presenting problems have been discussed, and they are in agreement with the care plan formulated and outlined with them. I have encouraged them to ask questions as they arise throughout their visit. Progress Notes:  ED Course as of 12/19/22 0716   Mon Dec 19, 2022   0710 Labs and imaging reviewed. CBC is unremarkable. UA is normal.  CMP shows mild hyponatremia with sodium of 133 and glucose is elevated at 131. ALT is elevated at 81 but other LFTs are normal..  Lipase is normal.  Ultrasound is negative for gallbladder disease or ductal dilatation. There is evidence of known hepatic steatosis on ultrasound. Patient is tolerating p.o. and feels much better in the ED. Ready for discharge. Will instruct him to follow-up with general GI and his liver specialist.  Return to ED for worsening symptoms. I have prescribed Protonix and Zofran along with tramadol and Bentyl for symptom relief.  [AO]      ED Course User Index  [AO] Luciana Block MD       Disposition:  Discharge home    PLAN:  1. Current Discharge Medication List        START taking these medications    Details   pantoprazole (Protonix) 40 mg tablet Take 1 Tablet by mouth daily for 20 days. Qty: 20 Tablet, Refills: 0  Start date: 12/19/2022, End date: 1/8/2023      ondansetron hcl (Zofran) 4 mg tablet Take 1 Tablet by mouth every eight (8) hours as needed for Nausea.   Qty: 20 Tablet, Refills: 0  Start date: 12/19/2022      dicyclomine (BENTYL) 20 mg tablet Take 1 Tablet by mouth every six (6) hours. Qty: 20 Tablet, Refills: 0  Start date: 12/19/2022      traMADoL (Ultram) 50 mg tablet Take 1 Tablet by mouth every six (6) hours as needed for Pain for up to 3 days. Max Daily Amount: 200 mg. Qty: 12 Tablet, Refills: 0  Start date: 12/19/2022, End date: 12/22/2022    Associated Diagnoses: Abdominal pain, epigastric           2. Follow-up Information       Follow up With Specialties Details Why Contact Info    Chelsy De Jesus MD Family Medicine Schedule an appointment as soon as possible for a visit   [facility] [phone]      [facility] EMERGENCY DEPT Emergency Medicine  If symptoms worsen [address] [facility]    Malaika Le MD Gastroenterology Schedule an appointment as soon as possible for a visit   [phone]            Return to ED if worse     Diagnosis     Clinical Impression:   1.  Abdominal pain, epigastric

## 2023-02-07 ENCOUNTER — OFFICE VISIT (OUTPATIENT)
Dept: FAMILY MEDICINE CLINIC | Age: 43
End: 2023-02-07
Payer: COMMERCIAL

## 2023-02-07 VITALS
TEMPERATURE: 98.7 F | OXYGEN SATURATION: 97 % | RESPIRATION RATE: 12 BRPM | SYSTOLIC BLOOD PRESSURE: 124 MMHG | HEIGHT: 66 IN | WEIGHT: 215.2 LBS | DIASTOLIC BLOOD PRESSURE: 81 MMHG | BODY MASS INDEX: 34.58 KG/M2 | HEART RATE: 65 BPM

## 2023-02-07 DIAGNOSIS — E78.2 MIXED HYPERLIPIDEMIA: ICD-10-CM

## 2023-02-07 DIAGNOSIS — K76.0 NAFLD (NONALCOHOLIC FATTY LIVER DISEASE): ICD-10-CM

## 2023-02-07 DIAGNOSIS — R73.02 IGT (IMPAIRED GLUCOSE TOLERANCE): ICD-10-CM

## 2023-02-07 DIAGNOSIS — E66.9 NON MORBID OBESITY: ICD-10-CM

## 2023-02-07 DIAGNOSIS — Z80.42 FAMILY HISTORY OF PROSTATE CANCER IN FATHER: ICD-10-CM

## 2023-02-07 DIAGNOSIS — Z51.81 ENCOUNTER FOR MEDICATION MONITORING: ICD-10-CM

## 2023-02-07 DIAGNOSIS — Z00.00 ROUTINE GENERAL MEDICAL EXAMINATION AT A HEALTH CARE FACILITY: Primary | ICD-10-CM

## 2023-02-07 DIAGNOSIS — Z23 ENCOUNTER FOR IMMUNIZATION: ICD-10-CM

## 2023-02-07 LAB
GLUCOSE POC: 97 MG/DL
HBA1C MFR BLD HPLC: 5.7 %

## 2023-02-07 PROCEDURE — 90471 IMMUNIZATION ADMIN: CPT | Performed by: FAMILY MEDICINE

## 2023-02-07 PROCEDURE — 90715 TDAP VACCINE 7 YRS/> IM: CPT | Performed by: FAMILY MEDICINE

## 2023-02-07 PROCEDURE — 82947 ASSAY GLUCOSE BLOOD QUANT: CPT | Performed by: FAMILY MEDICINE

## 2023-02-07 PROCEDURE — 83036 HEMOGLOBIN GLYCOSYLATED A1C: CPT | Performed by: FAMILY MEDICINE

## 2023-02-07 PROCEDURE — 99396 PREV VISIT EST AGE 40-64: CPT | Performed by: FAMILY MEDICINE

## 2023-02-07 NOTE — PROGRESS NOTES
Subjective:   Obed Joshi is a 38 y.o. male presenting for his annual checkup. Hypercholesterolemia follow up:  Compliant w/ low fat, low cholesterol diet. Tolerating Tricor. Exercising some but planning to increase this and working on weight loss. No muscle nor abdominal pain, no skin discoloration. Has FH of early CAD. Has had negative CT heart scan with calcium score of zero March 2015. Has NAFLD. Being followed by hepatology. Overall liver enzymes elevations have been stable. Glucose intolerance reveiw:  He has had IGT. Last a1c level was 5.6% in January 2020. Diabetic ROS - further diabetic ROS: no polyuria or polydipsia, no chest pain, dyspnea or TIA's, no numbness, tingling or pain in extremities, no unusual visual symptoms. Lab review: orders written for new lab studies as appropriate; see orders. ROS:  Feeling well. No dyspnea or chest pain on exertion. No abdominal pain, change in bowel habits, black or bloody stools. No urinary tract or prostatic symptoms. No neurological complaints. Has had couple of episode of abd pain over the past year that comes on in the middle of the night. No particular food triggers noted. Last episode this past December. Had negative work up with [facility] and labs. Seeing GI on next month for follow up. Patient Active Problem List   Diagnosis Code    Mixed hyperlipidemia E78.2    CALI (nonalcoholic steatohepatitis) K75.81    Severe obesity (HCC) E66.01       Current Outpatient Medications   Medication Sig Dispense Refill    ondansetron hcl (Zofran) 4 mg tablet Take 1 Tablet by mouth every eight (8) hours as needed for Nausea. 20 Tablet 0    fenofibrate nanocrystallized (TRICOR) 145 mg tablet Take 1 tablet by mouth daily 90 Tablet 3    diclofenac EC (VOLTAREN) 75 mg EC tablet Take 1 Tab by mouth two (2) times daily as needed for Pain.  30 Tab 1    cyclobenzaprine (FLEXERIL) 10 mg tablet Take 1 Tab by mouth nightly as needed for Muscle Spasm(s) (and muscle tightness in the back). 30 Tab 0    ferrous sulfate (IRON PO) Take 1 Tab by mouth daily. omega-3 fatty acids-vitamin e 1,000 mg cap Take 1 Cap by mouth two (2) times a day.          No Known Allergies      Past Medical History:   Diagnosis Date    Hypercholesterolemia          Past Surgical History:   Procedure Laterality Date    HX VASECTOMY  12/2009    HX VASECTOMY      HX WISDOM TEETH EXTRACTION             Family History   Problem Relation Age of Onset    Heart Disease Father     Cancer Father         Prostate, Throat, Colon    Hypertension Father     Heart Attack Father     Elevated Lipids Father     Hypertension Mother     High Cholesterol Mother     Bleeding Prob Mother         Stomach    Elevated Lipids Mother     Drug Abuse Brother        Social History     Tobacco Use    Smoking status: Never    Smokeless tobacco: Never   Substance Use Topics    Alcohol use: Never          Lab Results   Component Value Date/Time    WBC 6.4 12/19/2022 01:58 AM    HGB 12.8 12/19/2022 01:58 AM    HCT 38.0 12/19/2022 01:58 AM    PLATELET 895 15/26/0800 01:58 AM    MCV 89.8 12/19/2022 01:58 AM       Lab Results   Component Value Date/Time    Cholesterol, total 249 (H) 01/12/2022 09:46 AM    HDL Cholesterol 33 01/12/2022 09:46 AM    LDL, calculated 160.8 (H) 01/12/2022 09:46 AM    Triglyceride 276 (H) 01/12/2022 09:46 AM    CHOL/HDL Ratio 7.5 (H) 01/12/2022 09:46 AM       Lab Results   Component Value Date/Time    Sodium 133 (L) 12/19/2022 01:58 AM    Potassium 3.8 12/19/2022 01:58 AM    Chloride 102 12/19/2022 01:58 AM    CO2 25 12/19/2022 01:58 AM    Anion gap 6 12/19/2022 01:58 AM    Glucose 131 (H) 12/19/2022 01:58 AM    BUN 16 12/19/2022 01:58 AM    Creatinine 1.05 12/19/2022 01:58 AM    BUN/Creatinine ratio 15 12/19/2022 01:58 AM    GFR est AA >60 01/12/2022 09:46 AM    GFR est non-AA >60 01/12/2022 09:46 AM    Calcium 9.7 12/19/2022 01:58 AM    Bilirubin, total 0.4 12/19/2022 01:58 AM    ALT (SGPT) 81 (H) 12/19/2022 01:58 AM    Alk. phosphatase 75 12/19/2022 01:58 AM    Protein, total 7.8 12/19/2022 01:58 AM    Albumin 4.5 12/19/2022 01:58 AM    Globulin 3.3 12/19/2022 01:58 AM    A-G Ratio 1.4 12/19/2022 01:58 AM      Lab Results   Component Value Date/Time    Hemoglobin A1c 5.8 (H) 05/05/2017 04:15 PM    Hemoglobin A1c (POC) 5.7 02/07/2023 02:26 PM         Objective:     Visit Vitals  /81   Pulse 65   Temp 98.7 °F (37.1 °C)   Resp 12   Ht 5' 6\" (1.676 m)   Wt 215 lb 3.2 oz (97.6 kg)   SpO2 97%   BMI 34.73 kg/m²     The patient appears well, alert, oriented x 3, in no distress. ENT normal.  Neck supple. No adenopathy or thyromegaly. JOSHUA. Lungs are clear, good air entry, no wheezes, rhonchi or rales. S1 and S2 normal, no murmurs, regular rate and rhythm. Abdomen is soft without tenderness, guarding, mass or organomegaly. Extremities show no edema, normal peripheral pulses. Neurological is normal without focal findings. Assessment/Plan:   healthy adult male  lose weight, increase physical activity, follow low fat diet, follow low salt diet, routine labs ordered, call if any problems. ASSESSMENT and PLAN  Diagnoses and all orders for this visit:    1. Routine general medical examination at a health care facility    2. Mixed hyperlipidemia  Continue to monitor. Work on diet and exercise.  -     LIPID PANEL; Future    3. NAFLD (nonalcoholic fatty liver disease)  Stable     4. IGT (impaired glucose tolerance)  Stable at 5.7%. Continue to monitor. Work on diet and exercise. -     AMB POC HEMOGLOBIN A1C  -     AMB POC GLUCOSE, QUANTITATIVE, BLOOD    5. Encounter for medication monitoring    6. Non morbid obesity  I have reviewed/discussed the above normal BMI with the patient. I have recommended the following interventions: dietary management education, guidance, and counseling . 7. Family history of prostate cancer in father  -     PSA SCREENING (SCREENING);  Future      Follow-up and Dispositions Return in about 6 months (around 8/7/2023). reviewed diet, exercise and weight control  cardiovascular risk and specific lipid/LDL goals reviewed  reviewed medications and side effects in detail  specific diabetic recommendations: low cholesterol diet, weight control and daily exercise discussed and glycohemoglobin and other lab monitoring discussed    I have discussed diagnosis listed in this note with pt and/or family. I have discussed treatment plans and options and the risk/benefit analysis of those options, including safe use of medications and possible medication side effects. Through the use of shared decision making we have agreed to the above plan. The patient has received an after-visit summary and questions were answered concerning future plans and follow up. Advise pt of any urgent changes then to proceed to the ER.

## 2023-02-07 NOTE — PROGRESS NOTES
Patient identified by 2 identifiers. Chief Complaint   Patient presents with    Physical     1. Have you been to the ER, urgent care clinic since your last visit? Hospital[facility] since your last visit? [facility]    2. Have you seen or consulted any other health care providers outside of the [address] [place] [address] since your last visit? Include any pap smears or colon screening.  No

## 2023-02-08 LAB
ALBUMIN SERPL-MCNC: 4.8 G/DL (ref 3.5–5)
ALBUMIN/GLOB SERPL: 1.6 (ref 1.1–2.2)
ALP SERPL-CCNC: 64 U/L (ref 45–117)
ALT SERPL-CCNC: 87 U/L (ref 12–78)
ANION GAP SERPL CALC-SCNC: 7 MMOL/L (ref 5–15)
AST SERPL-CCNC: 34 U/L (ref 15–37)
BILIRUB DIRECT SERPL-MCNC: <0.1 MG/DL (ref 0–0.2)
BILIRUB SERPL-MCNC: 0.3 MG/DL (ref 0.2–1)
BUN SERPL-MCNC: 20 MG/DL (ref 6–20)
BUN/CREAT SERPL: 16 (ref 12–20)
CALCIUM SERPL-MCNC: 9.8 MG/DL (ref 8.5–10.1)
CHLORIDE SERPL-SCNC: 104 MMOL/L (ref 97–108)
CHOLEST SERPL-MCNC: 227 MG/DL
CO2 SERPL-SCNC: 25 MMOL/L (ref 21–32)
CREAT SERPL-MCNC: 1.24 MG/DL (ref 0.7–1.3)
ERYTHROCYTE [DISTWIDTH] IN BLOOD BY AUTOMATED COUNT: 12.4 % (ref 11.5–14.5)
GLOBULIN SER CALC-MCNC: 3 G/DL (ref 2–4)
GLUCOSE SERPL-MCNC: 101 MG/DL (ref 65–100)
HCT VFR BLD AUTO: 38.1 % (ref 36.6–50.3)
HDLC SERPL-MCNC: 30 MG/DL
HDLC SERPL: 7.6 (ref 0–5)
HGB BLD-MCNC: 12.5 G/DL (ref 12.1–17)
LDLC SERPL CALC-MCNC: 119.2 MG/DL (ref 0–100)
MCH RBC QN AUTO: 29.6 PG (ref 26–34)
MCHC RBC AUTO-ENTMCNC: 32.8 G/DL (ref 30–36.5)
MCV RBC AUTO: 90.3 FL (ref 80–99)
NRBC # BLD: 0 K/UL (ref 0–0.01)
NRBC BLD-RTO: 0 PER 100 WBC
PLATELET # BLD AUTO: 293 K/UL (ref 150–400)
PMV BLD AUTO: 11.1 FL (ref 8.9–12.9)
POTASSIUM SERPL-SCNC: 3.8 MMOL/L (ref 3.5–5.1)
PROT SERPL-MCNC: 7.8 G/DL (ref 6.4–8.2)
PSA SERPL-MCNC: 0.7 NG/ML (ref 0.01–4)
RBC # BLD AUTO: 4.22 M/UL (ref 4.1–5.7)
SODIUM SERPL-SCNC: 136 MMOL/L (ref 136–145)
TRIGL SERPL-MCNC: 389 MG/DL (ref ?–150)
VLDLC SERPL CALC-MCNC: 77.8 MG/DL
WBC # BLD AUTO: 6.3 K/UL (ref 4.1–11.1)

## 2023-02-09 LAB
INR PPP: 1.1 (ref 0.9–1.1)
PROTHROMBIN TIME: 11 SEC (ref 9–11.1)

## 2023-02-09 NOTE — PROGRESS NOTES
Pt not seen since liver biopsy 7/2022. Stage 3. Sent copy of labs to [address] letter and asked that he contact office to schedule appt.

## 2023-06-28 ENCOUNTER — HOSPITAL ENCOUNTER (EMERGENCY)
Facility: HOSPITAL | Age: 43
Discharge: HOME | End: 2023-06-28
Attending: EMERGENCY MEDICINE
Payer: COMMERCIAL

## 2023-06-28 VITALS
HEIGHT: 66 IN | TEMPERATURE: 97.8 F | OXYGEN SATURATION: 94 % | HEART RATE: 68 BPM | RESPIRATION RATE: 20 BRPM | SYSTOLIC BLOOD PRESSURE: 138 MMHG | DIASTOLIC BLOOD PRESSURE: 88 MMHG | WEIGHT: 214 LBS | BODY MASS INDEX: 34.39 KG/M2

## 2023-06-28 DIAGNOSIS — T63.441A ALLERGIC REACTION TO BEE STING: Primary | ICD-10-CM

## 2023-06-28 PROCEDURE — 6360000002 HC RX W HCPCS: Performed by: EMERGENCY MEDICINE

## 2023-06-28 PROCEDURE — 2580000003 HC RX 258: Performed by: EMERGENCY MEDICINE

## 2023-06-28 PROCEDURE — 96374 THER/PROPH/DIAG INJ IV PUSH: CPT

## 2023-06-28 PROCEDURE — A4216 STERILE WATER/SALINE, 10 ML: HCPCS | Performed by: EMERGENCY MEDICINE

## 2023-06-28 PROCEDURE — 2500000003 HC RX 250 WO HCPCS: Performed by: EMERGENCY MEDICINE

## 2023-06-28 PROCEDURE — 96375 TX/PRO/DX INJ NEW DRUG ADDON: CPT

## 2023-06-28 PROCEDURE — 99284 EMERGENCY DEPT VISIT MOD MDM: CPT

## 2023-06-28 RX ORDER — PREDNISONE 20 MG/1
20 TABLET ORAL 2 TIMES DAILY
Qty: 10 TABLET | Refills: 0 | Status: SHIPPED | OUTPATIENT
Start: 2023-06-28 | End: 2023-07-03

## 2023-06-28 RX ORDER — HYDROXYZINE HYDROCHLORIDE 25 MG/1
25 TABLET, FILM COATED ORAL EVERY 6 HOURS PRN
Qty: 30 TABLET | Refills: 0 | Status: SHIPPED | OUTPATIENT
Start: 2023-06-28

## 2023-06-28 RX ORDER — EPINEPHRINE 0.3 MG/.3ML
0.3 INJECTION SUBCUTANEOUS
Qty: 2 EACH | Refills: 0 | Status: SHIPPED | OUTPATIENT
Start: 2023-06-28 | End: 2023-06-28

## 2023-06-28 RX ADMIN — METHYLPREDNISOLONE SODIUM SUCCINATE 40 MG: 40 INJECTION, POWDER, FOR SOLUTION INTRAMUSCULAR; INTRAVENOUS at 20:13

## 2023-06-28 RX ADMIN — FAMOTIDINE 20 MG: 10 INJECTION, SOLUTION INTRAVENOUS at 20:13

## 2023-06-28 ASSESSMENT — PAIN - FUNCTIONAL ASSESSMENT: PAIN_FUNCTIONAL_ASSESSMENT: NONE - DENIES PAIN

## 2023-06-28 ASSESSMENT — LIFESTYLE VARIABLES
HOW MANY STANDARD DRINKS CONTAINING ALCOHOL DO YOU HAVE ON A TYPICAL DAY: PATIENT DOES NOT DRINK
HOW OFTEN DO YOU HAVE A DRINK CONTAINING ALCOHOL: NEVER

## 2023-09-12 RX ORDER — FENOFIBRATE 145 MG/1
145 TABLET, COATED ORAL DAILY
Qty: 90 TABLET | Refills: 0 | Status: SHIPPED | OUTPATIENT
Start: 2023-09-12

## 2023-09-12 NOTE — TELEPHONE ENCOUNTER
Last appointment: 2/7/23  Next appointment: Cholo Part to follow-up 8/7/23  Previous refill encounter(s): 8/31/22 #90 with 3 refills    Requested Prescriptions     Pending Prescriptions Disp Refills    fenofibrate (TRICOR) 145 MG tablet [Pharmacy Med Name: FENOFIBRATE  TAB 145MG] 90 tablet 0     Sig: TAKE 1 TABLET DAILY         For Pharmacy Admin Tracking Only    Program: Medication Refill  CPA in place:    Recommendation Provided To:    Intervention Detail: New Rx: 1, reason: Patient Preference  Intervention Accepted By:   Cyrus Andino Closed?:    Time Spent (min): 5

## 2023-11-14 RX ORDER — AZITHROMYCIN 250 MG/1
250 TABLET, FILM COATED ORAL SEE ADMIN INSTRUCTIONS
Qty: 6 TABLET | Refills: 0 | Status: SHIPPED | OUTPATIENT
Start: 2023-11-14 | End: 2023-11-19

## 2023-12-11 RX ORDER — FENOFIBRATE 145 MG/1
145 TABLET, COATED ORAL DAILY
Qty: 90 TABLET | Refills: 1 | Status: SHIPPED | OUTPATIENT
Start: 2023-12-11

## 2024-02-05 ENCOUNTER — OFFICE VISIT (OUTPATIENT)
Age: 44
End: 2024-02-05
Payer: COMMERCIAL

## 2024-02-05 VITALS
DIASTOLIC BLOOD PRESSURE: 74 MMHG | RESPIRATION RATE: 16 BRPM | WEIGHT: 213.8 LBS | BODY MASS INDEX: 34.36 KG/M2 | OXYGEN SATURATION: 97 % | SYSTOLIC BLOOD PRESSURE: 134 MMHG | TEMPERATURE: 98.7 F | HEART RATE: 72 BPM | HEIGHT: 66 IN

## 2024-02-05 DIAGNOSIS — Z12.5 SCREENING PSA (PROSTATE SPECIFIC ANTIGEN): ICD-10-CM

## 2024-02-05 DIAGNOSIS — R73.02 IMPAIRED GLUCOSE TOLERANCE (ORAL): ICD-10-CM

## 2024-02-05 DIAGNOSIS — Z00.00 ROUTINE GENERAL MEDICAL EXAMINATION AT A HEALTH CARE FACILITY: Primary | ICD-10-CM

## 2024-02-05 DIAGNOSIS — Z11.59 NEED FOR HEPATITIS C SCREENING TEST: ICD-10-CM

## 2024-02-05 DIAGNOSIS — Z80.42 FAMILY HISTORY OF PROSTATE CANCER IN FATHER: ICD-10-CM

## 2024-02-05 DIAGNOSIS — Z79.899 ENCOUNTER FOR LONG-TERM (CURRENT) USE OF MEDICATIONS: ICD-10-CM

## 2024-02-05 DIAGNOSIS — E66.9 NON MORBID OBESITY: ICD-10-CM

## 2024-02-05 DIAGNOSIS — K21.9 GASTROESOPHAGEAL REFLUX DISEASE WITHOUT ESOPHAGITIS: ICD-10-CM

## 2024-02-05 DIAGNOSIS — E78.2 MIXED HYPERLIPIDEMIA: ICD-10-CM

## 2024-02-05 DIAGNOSIS — K76.0 FATTY (CHANGE OF) LIVER, NOT ELSEWHERE CLASSIFIED: ICD-10-CM

## 2024-02-05 PROCEDURE — 99396 PREV VISIT EST AGE 40-64: CPT | Performed by: FAMILY MEDICINE

## 2024-02-05 RX ORDER — EPINEPHRINE 0.3 MG/.3ML
0.3 INJECTION SUBCUTANEOUS
Qty: 2 EACH | Refills: 1 | Status: SHIPPED | OUTPATIENT
Start: 2024-02-05 | End: 2024-02-05

## 2024-02-05 RX ORDER — OMEPRAZOLE 20 MG/1
CAPSULE, DELAYED RELEASE ORAL
COMMUNITY
Start: 2024-01-17

## 2024-02-05 SDOH — ECONOMIC STABILITY: HOUSING INSECURITY
IN THE LAST 12 MONTHS, WAS THERE A TIME WHEN YOU DID NOT HAVE A STEADY PLACE TO SLEEP OR SLEPT IN A SHELTER (INCLUDING NOW)?: NO

## 2024-02-05 SDOH — ECONOMIC STABILITY: FOOD INSECURITY: WITHIN THE PAST 12 MONTHS, YOU WORRIED THAT YOUR FOOD WOULD RUN OUT BEFORE YOU GOT MONEY TO BUY MORE.: NEVER TRUE

## 2024-02-05 SDOH — ECONOMIC STABILITY: INCOME INSECURITY: HOW HARD IS IT FOR YOU TO PAY FOR THE VERY BASICS LIKE FOOD, HOUSING, MEDICAL CARE, AND HEATING?: NOT VERY HARD

## 2024-02-05 SDOH — ECONOMIC STABILITY: FOOD INSECURITY: WITHIN THE PAST 12 MONTHS, THE FOOD YOU BOUGHT JUST DIDN'T LAST AND YOU DIDN'T HAVE MONEY TO GET MORE.: NEVER TRUE

## 2024-02-05 ASSESSMENT — PATIENT HEALTH QUESTIONNAIRE - PHQ9
SUM OF ALL RESPONSES TO PHQ QUESTIONS 1-9: 0
SUM OF ALL RESPONSES TO PHQ9 QUESTIONS 1 & 2: 0
2. FEELING DOWN, DEPRESSED OR HOPELESS: 0
1. LITTLE INTEREST OR PLEASURE IN DOING THINGS: 0

## 2024-02-05 NOTE — PROGRESS NOTES
Chief Complaint   Patient presents with    Annual Exam       \"Have you been to the ER, urgent care clinic since your last visit?  Hospitalized since your last visit?\"    Yes, [facility].     “Have you seen or consulted any other health care providers outside of [facility] since your last visit?”    Yes, Jeff Cerrato.           Vitals:    24 1419   BP: 134/74   Pulse: 72   Resp: 16   Temp: 98.7 °F (37.1 °C)   SpO2: 97%       Health Maintenance Due   Topic Date Due    Hepatitis B vaccine (1 of 3 - 3-dose series) Never done    HIV screen  Never done    Hepatitis C screen  Never done    Flu vaccine (1) 2023    COVID-19 Vaccine (3 - 2023-24 season) 2023    Depression Screen  2024        The patient, Jamal Garrido, identity was verified by name and .   
medication side effects.   Through the use of shared decision making we have agreed to the above plan. The patient has received an after-visit summary and questions were answered concerning future plans and follow up.  Advise pt of any urgent changes then to proceed to the ER.

## 2024-02-07 LAB
ALBUMIN SERPL-MCNC: 5.3 G/DL (ref 4.1–5.1)
ALBUMIN/GLOB SERPL: 2.2 {RATIO} (ref 1.2–2.2)
ALP SERPL-CCNC: 60 IU/L (ref 44–121)
ALT SERPL-CCNC: 66 IU/L (ref 0–44)
APPEARANCE UR: ABNORMAL
AST SERPL-CCNC: 35 IU/L (ref 0–40)
BACTERIA #/AREA URNS HPF: NORMAL /[HPF]
BILIRUB SERPL-MCNC: <0.2 MG/DL (ref 0–1.2)
BILIRUB UR QL STRIP: NEGATIVE
BUN SERPL-MCNC: 18 MG/DL (ref 6–24)
BUN/CREAT SERPL: 20 (ref 9–20)
CALCIUM SERPL-MCNC: 10.1 MG/DL (ref 8.7–10.2)
CASTS URNS QL MICRO: NORMAL /LPF
CHLORIDE SERPL-SCNC: 100 MMOL/L (ref 96–106)
CHOLEST SERPL-MCNC: 237 MG/DL (ref 100–199)
CO2 SERPL-SCNC: 18 MMOL/L (ref 20–29)
COLOR UR: YELLOW
CREAT SERPL-MCNC: 0.9 MG/DL (ref 0.76–1.27)
EGFRCR SERPLBLD CKD-EPI 2021: 107 ML/MIN/1.73
EPI CELLS #/AREA URNS HPF: NORMAL /HPF (ref 0–10)
ERYTHROCYTE [DISTWIDTH] IN BLOOD BY AUTOMATED COUNT: 13.5 % (ref 11.6–15.4)
GLOBULIN SER CALC-MCNC: 2.4 G/DL (ref 1.5–4.5)
GLUCOSE SERPL-MCNC: 98 MG/DL (ref 70–99)
GLUCOSE UR QL STRIP: NEGATIVE
HBA1C MFR BLD: 5.8 % (ref 4.8–5.6)
HCT VFR BLD AUTO: 40.1 % (ref 37.5–51)
HCV IGG SERPL QL IA: NON REACTIVE
HDLC SERPL-MCNC: 35 MG/DL
HGB BLD-MCNC: 12.9 G/DL (ref 13–17.7)
HGB UR QL STRIP: NEGATIVE
IMP & REVIEW OF LAB RESULTS: NORMAL
KETONES UR QL STRIP: NEGATIVE
LDLC SERPL CALC-MCNC: 156 MG/DL (ref 0–99)
LEUKOCYTE ESTERASE UR QL STRIP: NEGATIVE
MCH RBC QN AUTO: 29.5 PG (ref 26.6–33)
MCHC RBC AUTO-ENTMCNC: 32.2 G/DL (ref 31.5–35.7)
MCV RBC AUTO: 92 FL (ref 79–97)
MICRO URNS: ABNORMAL
MICRO URNS: ABNORMAL
NITRITE UR QL STRIP: NEGATIVE
PH UR STRIP: 5.5 [PH] (ref 5–7.5)
PLATELET # BLD AUTO: 285 X10E3/UL (ref 150–450)
POTASSIUM SERPL-SCNC: 4.1 MMOL/L (ref 3.5–5.2)
PROT SERPL-MCNC: 7.7 G/DL (ref 6–8.5)
PROT UR QL STRIP: NEGATIVE
RBC # BLD AUTO: 4.37 X10E6/UL (ref 4.14–5.8)
RBC #/AREA URNS HPF: NORMAL /HPF (ref 0–2)
SODIUM SERPL-SCNC: 137 MMOL/L (ref 134–144)
SP GR UR STRIP: >=1.03 (ref 1–1.03)
TRIGL SERPL-MCNC: 248 MG/DL (ref 0–149)
UROBILINOGEN UR STRIP-MCNC: 0.2 MG/DL (ref 0.2–1)
VLDLC SERPL CALC-MCNC: 46 MG/DL (ref 5–40)
WBC # BLD AUTO: 5.8 X10E3/UL (ref 3.4–10.8)
WBC #/AREA URNS HPF: NORMAL /HPF (ref 0–5)

## 2024-02-12 ENCOUNTER — TELEPHONE (OUTPATIENT)
Age: 44
End: 2024-02-12

## 2024-02-12 NOTE — TELEPHONE ENCOUNTER
----- Message from Luz Ventura MD sent at 2/5/2024  3:00 PM EST -----  Please send for his colonoscopy report from [facility] done on [facility].

## 2024-02-19 RX ORDER — AZITHROMYCIN 250 MG/1
TABLET, FILM COATED ORAL
Qty: 6 TABLET | Refills: 0 | Status: SHIPPED | OUTPATIENT
Start: 2024-02-19 | End: 2024-02-29

## 2024-03-19 RX ORDER — FENOFIBRATE 145 MG/1
145 TABLET, COATED ORAL DAILY
Qty: 90 TABLET | Refills: 1 | Status: SHIPPED | OUTPATIENT
Start: 2024-03-19

## 2024-04-19 RX ORDER — FENOFIBRATE 145 MG/1
145 TABLET, COATED ORAL DAILY
Qty: 90 TABLET | Refills: 1 | OUTPATIENT
Start: 2024-04-19

## 2024-04-19 NOTE — TELEPHONE ENCOUNTER
Tricor was sent on 3/19/24 for #90 with 1 refill    For Pharmacy Admin Tracking Only    Program: Medication Refill  CPA in place:    Recommendation Provided To:   Intervention Detail: Discontinued Rx: 1, reason: Duplicate Therapy  Intervention Accepted By:   Gap Closed?:    Time Spent (min): 5

## 2024-06-13 RX ORDER — FENOFIBRATE 145 MG/1
145 TABLET, COATED ORAL DAILY
Qty: 90 TABLET | Refills: 3 | Status: SHIPPED | OUTPATIENT
Start: 2024-06-13

## 2024-06-13 NOTE — TELEPHONE ENCOUNTER
[facility] is requesting a 90 day supply  Previous Rx was sent to [facility]    Last appointment: 2/5/24  Next appointment: Advised to follow-up 2/2025    Requested Prescriptions     Pending Prescriptions Disp Refills    fenofibrate (TRICOR) 145 MG tablet 90 tablet 3     Sig: Take 1 tablet by mouth daily         For Pharmacy Admin Tracking Only    Program: Medication Refill  CPA in place:    Recommendation Provided To:   Intervention Detail: New Rx: 1, reason: Patient Preference  Intervention Accepted By:   Gap Closed?:    Time Spent (min): 5

## 2024-07-01 RX ORDER — EPINEPHRINE 0.3 MG/.3ML
0.3 INJECTION SUBCUTANEOUS
Qty: 2 EACH | Refills: 1 | Status: SHIPPED | OUTPATIENT
Start: 2024-07-01 | End: 2024-07-01

## 2024-12-02 ENCOUNTER — TELEPHONE (OUTPATIENT)
Age: 44
End: 2024-12-02

## 2024-12-02 NOTE — TELEPHONE ENCOUNTER
Pt called.  Has not been able to have BM in the past few days.  Stomach pain has improved thur the day today but he has not been able to moved his bowels.  No further nausea or vomiting but has not eaten much today.  Took dulcolax which has not helped with BM  Advise to add double dose of miralax with 4 to 6 ounces of warm prune juice for this evening.  Liquid diet until BM passes.    To call back if still not having BM.  To ER if increased abd pain.

## 2024-12-04 ENCOUNTER — TELEPHONE (OUTPATIENT)
Age: 44
End: 2024-12-04

## 2025-03-14 ENCOUNTER — OFFICE VISIT (OUTPATIENT)
Age: 45
End: 2025-03-14
Payer: COMMERCIAL

## 2025-03-14 VITALS
SYSTOLIC BLOOD PRESSURE: 132 MMHG | RESPIRATION RATE: 18 BRPM | DIASTOLIC BLOOD PRESSURE: 78 MMHG | TEMPERATURE: 97.7 F | OXYGEN SATURATION: 98 % | HEART RATE: 55 BPM

## 2025-03-14 DIAGNOSIS — Z79.899 ENCOUNTER FOR LONG-TERM (CURRENT) USE OF MEDICATIONS: ICD-10-CM

## 2025-03-14 DIAGNOSIS — E78.2 MIXED HYPERLIPIDEMIA: ICD-10-CM

## 2025-03-14 DIAGNOSIS — R73.02 IMPAIRED GLUCOSE TOLERANCE (ORAL): ICD-10-CM

## 2025-03-14 DIAGNOSIS — K21.9 GASTROESOPHAGEAL REFLUX DISEASE WITHOUT ESOPHAGITIS: ICD-10-CM

## 2025-03-14 DIAGNOSIS — Z80.42 FAMILY HISTORY OF PROSTATE CANCER IN FATHER: ICD-10-CM

## 2025-03-14 DIAGNOSIS — K76.0 FATTY (CHANGE OF) LIVER, NOT ELSEWHERE CLASSIFIED: ICD-10-CM

## 2025-03-14 DIAGNOSIS — Z13.1 SCREENING FOR DIABETES MELLITUS: ICD-10-CM

## 2025-03-14 DIAGNOSIS — Z91.030 ALLERGY TO YELLOW JACKETS: ICD-10-CM

## 2025-03-14 DIAGNOSIS — S76.911S MUSCLE STRAIN OF RIGHT THIGH, SEQUELA: ICD-10-CM

## 2025-03-14 DIAGNOSIS — Z12.5 SCREENING PSA (PROSTATE SPECIFIC ANTIGEN): ICD-10-CM

## 2025-03-14 DIAGNOSIS — Z00.00 ROUTINE GENERAL MEDICAL EXAMINATION AT A HEALTH CARE FACILITY: Primary | ICD-10-CM

## 2025-03-14 PROCEDURE — 99396 PREV VISIT EST AGE 40-64: CPT | Performed by: FAMILY MEDICINE

## 2025-03-14 RX ORDER — OMEPRAZOLE 20 MG/1
20 CAPSULE, DELAYED RELEASE ORAL DAILY
Qty: 90 CAPSULE | Refills: 3 | Status: SHIPPED | OUTPATIENT
Start: 2025-03-14

## 2025-03-14 RX ORDER — METHYLPREDNISOLONE 4 MG/1
TABLET ORAL
Qty: 1 KIT | Refills: 0 | Status: SHIPPED | OUTPATIENT
Start: 2025-03-14

## 2025-03-14 RX ORDER — EPINEPHRINE 0.3 MG/.3ML
0.3 INJECTION SUBCUTANEOUS
Qty: 2 EACH | Refills: 1 | Status: SHIPPED | OUTPATIENT
Start: 2025-03-14 | End: 2025-03-14

## 2025-03-14 SDOH — ECONOMIC STABILITY: FOOD INSECURITY: WITHIN THE PAST 12 MONTHS, THE FOOD YOU BOUGHT JUST DIDN'T LAST AND YOU DIDN'T HAVE MONEY TO GET MORE.: NEVER TRUE

## 2025-03-14 SDOH — ECONOMIC STABILITY: FOOD INSECURITY: WITHIN THE PAST 12 MONTHS, YOU WORRIED THAT YOUR FOOD WOULD RUN OUT BEFORE YOU GOT MONEY TO BUY MORE.: NEVER TRUE

## 2025-03-14 ASSESSMENT — PATIENT HEALTH QUESTIONNAIRE - PHQ9
SUM OF ALL RESPONSES TO PHQ QUESTIONS 1-9: 0
1. LITTLE INTEREST OR PLEASURE IN DOING THINGS: NOT AT ALL
SUM OF ALL RESPONSES TO PHQ QUESTIONS 1-9: 0
SUM OF ALL RESPONSES TO PHQ QUESTIONS 1-9: 0
2. FEELING DOWN, DEPRESSED OR HOPELESS: NOT AT ALL
SUM OF ALL RESPONSES TO PHQ QUESTIONS 1-9: 0

## 2025-03-14 NOTE — PROGRESS NOTES
Chief Complaint   Patient presents with    Annual Exam       \"Have you been to the ER, urgent care clinic since your last visit?  Hospitalized since your last visit?\"    NO    “Have you seen or consulted any other health care providers outside of [facility] since your last visit?”    NO          Click Here for Release of Records Request       There were no vitals filed for this visit.   Health Maintenance Due   Topic Date Due    Hepatitis B vaccine (1 of 3 - 19+ 3-dose series) Never done    Flu vaccine (1) 2024    COVID-19 Vaccine (2024- season) 2024    A1C test (Diabetic or Prediabetic)  2025    Depression Screen  2025        The patient, Jamal Garrido, identity was verified by name and .

## 2025-03-14 NOTE — PROGRESS NOTES
Subjective:   Jamal Garrido is a 44 y.o. male presenting for his annual checkup.  Works for [employer] for 28 years.      Hypercholesterolemia follow up:  Compliant w/ low fat, low cholesterol diet.  Tolerating Tricor.  Exercising some but planning to increase this and working on weight loss.  No muscle nor abdominal pain, no skin discoloration.  Planning to ride bike when weather warms up.    Has FH of early CAD.  Has had negative CT heart scan with calcium score of zero March 2015.      Has NAFLD.  Was being followed by hepatology but has not been seen recently.  Overall liver enzymes elevations have been stable.  No abd pain noted.      Glucose intolerance reveiw:  He has had IGT.   Diabetic ROS - further diabetic ROS: no polyuria or polydipsia, no chest pain, dyspnea or TIA's, no numbness, tingling or pain in extremities, no unusual visual symptoms.   Lab review: orders written for new lab studies as appropriate; see orders.    ROS:  Feeling well. No dyspnea or chest pain on exertion. No abdominal pain, change in bowel habits, black or bloody stools. No urinary tract or prostatic symptoms. No neurological complaints.    Specific concerns today: Pain on the right upper leg over the past several weeks.  Pain comes and goes.  No particular trigger to increase the pain.  No injury noted.  No swelling.  Hurts mostly when is is laying still.  Has not taken anything for the pain.      Patient Active Problem List   Diagnosis    Mixed hyperlipidemia    Severe obesity    LICONA (nonalcoholic steatohepatitis)       Current Outpatient Medications   Medication Sig Dispense Refill    omeprazole (PRILOSEC) 20 MG delayed release capsule Take 1 capsule by mouth Daily 90 capsule 3    EPINEPHrine (EPIPEN 2-TANYA) 0.3 MG/0.3ML SOAJ injection Inject 0.3 mLs into the muscle once as needed (severe allergic reaction) Use as directed for allergic reaction 2 each 1    methylPREDNISolone (MEDROL DOSEPACK) 4 MG tablet

## 2025-03-15 LAB
ALBUMIN SERPL-MCNC: 5.2 G/DL (ref 4.1–5.1)
ALP SERPL-CCNC: 61 IU/L (ref 44–121)
ALT SERPL-CCNC: 108 IU/L (ref 0–44)
AST SERPL-CCNC: 58 IU/L (ref 0–40)
BILIRUB SERPL-MCNC: 0.3 MG/DL (ref 0–1.2)
BUN SERPL-MCNC: 16 MG/DL (ref 6–24)
BUN/CREAT SERPL: 15 (ref 9–20)
CALCIUM SERPL-MCNC: 10.3 MG/DL (ref 8.7–10.2)
CHLORIDE SERPL-SCNC: 100 MMOL/L (ref 96–106)
CHOLEST SERPL-MCNC: 236 MG/DL (ref 100–199)
CO2 SERPL-SCNC: 22 MMOL/L (ref 20–29)
CREAT SERPL-MCNC: 1.09 MG/DL (ref 0.76–1.27)
EGFRCR SERPLBLD CKD-EPI 2021: 85 ML/MIN/1.73
ERYTHROCYTE [DISTWIDTH] IN BLOOD BY AUTOMATED COUNT: 13 % (ref 11.6–15.4)
GLOBULIN SER CALC-MCNC: 2.6 G/DL (ref 1.5–4.5)
GLUCOSE SERPL-MCNC: 93 MG/DL (ref 70–99)
HBA1C MFR BLD: 6.2 % (ref 4.8–5.6)
HCT VFR BLD AUTO: 39.4 % (ref 37.5–51)
HDLC SERPL-MCNC: 35 MG/DL
HGB BLD-MCNC: 13.1 G/DL (ref 13–17.7)
IMP & REVIEW OF LAB RESULTS: NORMAL
LDLC SERPL CALC-MCNC: 164 MG/DL (ref 0–99)
MCH RBC QN AUTO: 30.5 PG (ref 26.6–33)
MCHC RBC AUTO-ENTMCNC: 33.2 G/DL (ref 31.5–35.7)
MCV RBC AUTO: 92 FL (ref 79–97)
PLATELET # BLD AUTO: 266 X10E3/UL (ref 150–450)
POTASSIUM SERPL-SCNC: 4.1 MMOL/L (ref 3.5–5.2)
PROT SERPL-MCNC: 7.8 G/DL (ref 6–8.5)
RBC # BLD AUTO: 4.3 X10E6/UL (ref 4.14–5.8)
SODIUM SERPL-SCNC: 138 MMOL/L (ref 134–144)
TRIGL SERPL-MCNC: 198 MG/DL (ref 0–149)
VLDLC SERPL CALC-MCNC: 37 MG/DL (ref 5–40)
WBC # BLD AUTO: 7.2 X10E3/UL (ref 3.4–10.8)

## 2025-03-20 ENCOUNTER — RESULTS FOLLOW-UP (OUTPATIENT)
Age: 45
End: 2025-03-20

## 2025-06-11 RX ORDER — FENOFIBRATE 145 MG/1
145 TABLET, FILM COATED ORAL DAILY
Qty: 90 TABLET | Refills: 3 | Status: SHIPPED | OUTPATIENT
Start: 2025-06-11

## 2025-06-11 NOTE — TELEPHONE ENCOUNTER
Last appointment: 3/14/25  Next appointment: Advised to follow-up 9/14/25  Previous refill encounter(s): 6/13/24    Requested Prescriptions     Pending Prescriptions Disp Refills    fenofibrate (TRICOR) 145 MG tablet [Pharmacy Med Name: FENOFIBRATE 145MG TABS] 90 tablet 3     Sig: TAKE ONE TABLET BY MOUTH EVERY DAY         For Pharmacy Admin Tracking Only    Program: Medication Refill  CPA in place:    Recommendation Provided To:   Intervention Detail: New Rx: 1, reason: Patient Preference  Intervention Accepted By:   Gap Closed?:    Time Spent (min): 5   Which Plastic Surgeon Are You Referring To?: A

## (undated) DEVICE — SYRINGE MED 20ML STD CLR PLAS LUERLOCK TIP N CTRL DISP

## (undated) DEVICE — MAX-CORE® DISPOSABLE CORE BIOPSY INSTRUMENT, 16G X 16CM: Brand: MAX-CORE

## (undated) DEVICE — TRAY BX SFT TISS W/ RUL ALC PREP PD FEN DRP TWL LUERLOCK

## (undated) DEVICE — KIT IV STRT W CHLORAPREP PD 1ML

## (undated) DEVICE — BAG BELONG PT PERS CLEAR HANDL

## (undated) DEVICE — NEONATAL-ADULT SPO2 SENSOR: Brand: NELLCOR

## (undated) DEVICE — SET ADMIN 16ML TBNG L100IN 2 Y INJ SITE IV PIGGY BK DISP

## (undated) DEVICE — KENDALL RADIOLUCENT FOAM MONITORING ELECTRODE -RECTANGULAR SHAPE: Brand: KENDALL

## (undated) DEVICE — SET EXTN TBNG L BOR 4 W STPCOCK ST 32IN PRIMING VOL 6ML

## (undated) DEVICE — CONTAINER SPEC 20 ML LID NEUT BUFF FORMALIN 10 % POLYPR STS

## (undated) DEVICE — CATH IV AUTOGRD BC BLU 22GA 25 -- INSYTE